# Patient Record
Sex: MALE | Race: OTHER | HISPANIC OR LATINO
[De-identification: names, ages, dates, MRNs, and addresses within clinical notes are randomized per-mention and may not be internally consistent; named-entity substitution may affect disease eponyms.]

---

## 2017-09-18 ENCOUNTER — RESULT REVIEW (OUTPATIENT)
Age: 47
End: 2017-09-18

## 2017-09-25 ENCOUNTER — INPATIENT (INPATIENT)
Facility: HOSPITAL | Age: 47
LOS: 4 days | Discharge: HOME HEALTH | End: 2017-09-30
Attending: STUDENT IN AN ORGANIZED HEALTH CARE EDUCATION/TRAINING PROGRAM | Admitting: STUDENT IN AN ORGANIZED HEALTH CARE EDUCATION/TRAINING PROGRAM
Payer: MEDICAID

## 2017-09-25 VITALS
RESPIRATION RATE: 18 BRPM | OXYGEN SATURATION: 97 % | TEMPERATURE: 99 F | DIASTOLIC BLOOD PRESSURE: 75 MMHG | SYSTOLIC BLOOD PRESSURE: 124 MMHG | HEART RATE: 93 BPM

## 2017-09-25 DIAGNOSIS — R09.1 PLEURISY: ICD-10-CM

## 2017-09-25 LAB
ALBUMIN SERPL ELPH-MCNC: 3.3 G/DL — SIGNIFICANT CHANGE UP (ref 3.3–5)
ALP SERPL-CCNC: 84 U/L — SIGNIFICANT CHANGE UP (ref 40–120)
ALT FLD-CCNC: 126 U/L — HIGH (ref 4–41)
APTT BLD: 28.2 SEC — SIGNIFICANT CHANGE UP (ref 27.5–37.4)
AST SERPL-CCNC: 68 U/L — HIGH (ref 4–40)
BILIRUB SERPL-MCNC: 0.2 MG/DL — SIGNIFICANT CHANGE UP (ref 0.2–1.2)
BLD GP AB SCN SERPL QL: NEGATIVE — SIGNIFICANT CHANGE UP
BUN SERPL-MCNC: 15 MG/DL — SIGNIFICANT CHANGE UP (ref 7–23)
CALCIUM SERPL-MCNC: 8.6 MG/DL — SIGNIFICANT CHANGE UP (ref 8.4–10.5)
CHLORIDE SERPL-SCNC: 97 MMOL/L — LOW (ref 98–107)
CO2 SERPL-SCNC: 23 MMOL/L — SIGNIFICANT CHANGE UP (ref 22–31)
CREAT SERPL-MCNC: 0.82 MG/DL — SIGNIFICANT CHANGE UP (ref 0.5–1.3)
GLUCOSE SERPL-MCNC: 103 MG/DL — HIGH (ref 70–99)
HCT VFR BLD CALC: 40.1 % — SIGNIFICANT CHANGE UP (ref 39–50)
HGB BLD-MCNC: 13.4 G/DL — SIGNIFICANT CHANGE UP (ref 13–17)
INR BLD: 1.24 — HIGH (ref 0.88–1.17)
MCHC RBC-ENTMCNC: 29.2 PG — SIGNIFICANT CHANGE UP (ref 27–34)
MCHC RBC-ENTMCNC: 33.4 % — SIGNIFICANT CHANGE UP (ref 32–36)
MCV RBC AUTO: 87.4 FL — SIGNIFICANT CHANGE UP (ref 80–100)
NRBC # FLD: 0 — SIGNIFICANT CHANGE UP
PLATELET # BLD AUTO: 300 K/UL — SIGNIFICANT CHANGE UP (ref 150–400)
PMV BLD: 10.1 FL — SIGNIFICANT CHANGE UP (ref 7–13)
POTASSIUM SERPL-MCNC: 4.4 MMOL/L — SIGNIFICANT CHANGE UP (ref 3.5–5.3)
POTASSIUM SERPL-SCNC: 4.4 MMOL/L — SIGNIFICANT CHANGE UP (ref 3.5–5.3)
PROT SERPL-MCNC: 6.3 G/DL — SIGNIFICANT CHANGE UP (ref 6–8.3)
PROTHROM AB SERPL-ACNC: 13.9 SEC — HIGH (ref 9.8–13.1)
RBC # BLD: 4.59 M/UL — SIGNIFICANT CHANGE UP (ref 4.2–5.8)
RBC # FLD: 11.7 % — SIGNIFICANT CHANGE UP (ref 10.3–14.5)
RH IG SCN BLD-IMP: POSITIVE — SIGNIFICANT CHANGE UP
SODIUM SERPL-SCNC: 133 MMOL/L — LOW (ref 135–145)
WBC # BLD: 9.94 K/UL — SIGNIFICANT CHANGE UP (ref 3.8–10.5)
WBC # FLD AUTO: 9.94 K/UL — SIGNIFICANT CHANGE UP (ref 3.8–10.5)

## 2017-09-25 PROCEDURE — 99222 1ST HOSP IP/OBS MODERATE 55: CPT

## 2017-09-25 PROCEDURE — 99223 1ST HOSP IP/OBS HIGH 75: CPT | Mod: 57

## 2017-09-25 RX ORDER — OXYCODONE AND ACETAMINOPHEN 5; 325 MG/1; MG/1
1 TABLET ORAL EVERY 4 HOURS
Qty: 0 | Refills: 0 | Status: DISCONTINUED | OUTPATIENT
Start: 2017-09-25 | End: 2017-09-27

## 2017-09-25 RX ORDER — HEPARIN SODIUM 5000 [USP'U]/ML
5000 INJECTION INTRAVENOUS; SUBCUTANEOUS EVERY 12 HOURS
Qty: 0 | Refills: 0 | Status: DISCONTINUED | OUTPATIENT
Start: 2017-09-25 | End: 2017-09-27

## 2017-09-25 RX ORDER — INFLUENZA VIRUS VACCINE 15; 15; 15; 15 UG/.5ML; UG/.5ML; UG/.5ML; UG/.5ML
0.5 SUSPENSION INTRAMUSCULAR ONCE
Qty: 0 | Refills: 0 | Status: DISCONTINUED | OUTPATIENT
Start: 2017-09-25 | End: 2017-09-27

## 2017-09-25 RX ORDER — OXYCODONE AND ACETAMINOPHEN 5; 325 MG/1; MG/1
2 TABLET ORAL EVERY 4 HOURS
Qty: 0 | Refills: 0 | Status: DISCONTINUED | OUTPATIENT
Start: 2017-09-25 | End: 2017-09-27

## 2017-09-25 RX ORDER — ACETAMINOPHEN 500 MG
650 TABLET ORAL EVERY 4 HOURS
Qty: 0 | Refills: 0 | Status: DISCONTINUED | OUTPATIENT
Start: 2017-09-25 | End: 2017-09-27

## 2017-09-25 RX ORDER — PANTOPRAZOLE SODIUM 20 MG/1
40 TABLET, DELAYED RELEASE ORAL DAILY
Qty: 0 | Refills: 0 | Status: DISCONTINUED | OUTPATIENT
Start: 2017-09-25 | End: 2017-09-27

## 2017-09-25 RX ADMIN — OXYCODONE AND ACETAMINOPHEN 2 TABLET(S): 5; 325 TABLET ORAL at 20:48

## 2017-09-25 RX ADMIN — OXYCODONE AND ACETAMINOPHEN 2 TABLET(S): 5; 325 TABLET ORAL at 20:31

## 2017-09-25 NOTE — H&P ADULT - NSHPLABSRESULTS_GEN_ALL_CORE
WBC 9.94, Hgb 13.4, Hct 40.1,   PT 13.9/ INR 1.24/ PTT 28.2  Na 133, K 4.4, BUN 15, Creat 0.82   , AST 68    CXR pnd    See HPI for results from [facility]

## 2017-09-25 NOTE — H&P ADULT - ASSESSMENT
Left lung recurrent pleural effusion, mass, PNA;  Plan OR for a FB, EBUS, L VATS for bx/ diagnostic purposes

## 2017-09-25 NOTE — H&P ADULT - HISTORY OF PRESENT ILLNESS
This 46 year old male was transferred from [facility] where he had been treated for a left pleural effusion and PNA since his admission on 9/17/17.  He has been complaining of left chest pain for 5 months.  The pain is dull and intermittent.  It is worse with coughing and when twisting his back to the right or left.  The pain radiates from the left lateral chest to the left axilla and to the left shoulder.  He was seen at the hospital and at his doctor's office for the pain.  A CXR had been done about 5 months ago and as it was negative, so he was given NSAIDS and muscle relaxants.  A CXR on 8/20/17 was also negative.  His cough worsened over the month after that and he began producing clear phlegm.  He went to the hospital on 9/17 and a CXR on that day showed a large left pleural effusion with LLL atelectasis and possible DAVE infiltrate.  He underwent an ultrasound-guided thoracentesis on 9/18 and then again on 9/22/17.  The first one drained 1.5 L and the second time drained 1.7 L.  Both were straw-colored and non-bloody.  Cytology was sent to   [facility] and is still pending.  A CT scan on 9/19 between the two drainages showed a large fluid collection with associated almost complete LLL atelectasis and minimal atelectasis in the DAVE and lingula. He was treated for the DAVE PNA with a course of Avelox.  The CT scan also showed right hilar and extensive mediastinal adenopathy, a small RLL nodule, and a left hilar mass encasing the LLL bronchus and encasing and occluding the LLL pulmonary artery. He was sent to [facility] in order to obtain a biopsy of the mass and because the most recent CXR from 9/24 showed that the effusion was reaccumulating since the CXR on 9/22.  He denies hemoptysis.  He ruled out for TB at [facility] with three negative sputums and a negative Quantiferon gold result.  He has a history of recent travel to the [place] for two weeks about 2 months ago. He is also originally from [place].  Additionally he smoked about a quarter pack of cigarettes for over twenty years until he quit 6 months ago. This 46 year old male was transferred from [facility] where he had been treated for a left pleural effusion and PNA since his admission on 9/17/17.  He has been complaining of left chest pain for 5 months.  The pain is dull and intermittent.  It is worse with coughing and when twisting his back to the right or left.  The pain radiates from the left lateral chest to the left axilla and to the left shoulder.  He was seen at the hospital and at his doctor's office for the pain.  A CXR had been done about 5 months ago and as it was negative, so he was given NSAIDS and muscle relaxants.  A CXR on 8/20/17 was also negative.  His cough worsened over the month after that and he began producing clear phlegm.  He went to the hospital on 9/17 and a CXR on that day showed a large left pleural effusion with LLL atelectasis and possible DAVE infiltrate.  He underwent an ultrasound-guided thoracentesis on 9/18 and then again on 9/22/17.  The first one drained 1.5 L and the second time drained 1.7 L.  Both were straw-colored and non-bloody.  Cytology was sent to [facility] and is still pending.  Pleural fluid cultures also sent to [facility] were negative.  A CT scan on 9/19 between the two drainages showed a large fluid collection with associated almost complete LLL atelectasis and minimal atelectasis in the DAVE and lingula. He was treated for the DAVE PNA with a course of Avelox.  The CT scan also showed right hilar and extensive mediastinal adenopathy, a small RLL nodule, and a left hilar mass encasing the LLL bronchus and encasing and occluding the LLL pulmonary artery. He was sent to [facility] in order to obtain a biopsy of the mass and because the most recent CXR from 9/24 showed that the effusion was rapidly reaccumulating since the CXR on 9/22.  He denies hemoptysis.  He ruled out for TB at [facility] with three negative sputums and a negative Quantiferon gold result.  He states that an HIV test was negative 2 months ago.  He has a history of recent travel to the [place] for two weeks about 2 months ago. He is also originally from [place].  Additionally he smoked about a quarter pack of cigarettes for over twenty years until he quit 6 months ago.

## 2017-09-25 NOTE — H&P ADULT - NSHPSOCIALHISTORY_GEN_ALL_CORE
, lives with wife, works as a  at the [employer], quit smoking 6 months ago after over twenty years of a quarter pack per day; social EtOH, no drugs

## 2017-09-25 NOTE — H&P ADULT - NSHPREVIEWOFSYSTEMS_GEN_ALL_CORE
REVIEW OF SYSTEMS      General: No Weight change/ Fatigue/ HA/Dizzy	    Skin/Breast: No Rashes/ Lesions/ Masses  	  Ophthalmologic: No Blurry vision/ Glaucoma/ Blindness  	  ENMT: No Hearing loss/ Drainage/ Lesions	    Respiratory and Thorax: Pos Cough/ Wheezing/ SOB/ Hemoptysis/ Pos Sputum production- clear, non-bloody/ Pos L chest dull pain that radiates to the shoulder.    	  Cardiovascular: No Chest pain/ Palpitations/ Diaphoresis	    Gastrointestinal: No Nausea/ Vomiting/ Constipation/ Appetite Change	    Genitourinary: No Hematuria/ Dysuria/ Frequency change/ Impotence	    Musculoskeletal: No Weakness/ Claudication	    Neurological: No Seizures/ TIA/ CVA/ Paresthesias	    Psychiatric: No Dementia/ Depression/ SI/HI	    Hematology/Lymphatics: No hx of bleeding/ Edema	    Endocrine: No Hyperglycemia/ Hypoglycemia    Allergic/Immunologic:  No Anaphylaxis/ Intolerance/ Recent illnesses

## 2017-09-25 NOTE — H&P ADULT - ATTENDING COMMENTS
patient seen and examined, images from outside hospital reviewed. drainage x 2 of recurrent left pleural effusion -will plan for left vats - pleural biopsy will eval for advanced stage - need tissue diagnosis. if no obvious pleural disease will plan for flex bronch/ebus of hilar mass.   NPO p midnight. patient understands plan is to obtain tissue for diagnosis .

## 2017-09-25 NOTE — H&P ADULT - NSHPPHYSICALEXAM_GEN_ALL_CORE
General: WN/WD NAD  Neurology: A&Ox3, nonfocal, ARELLANO x 4  Eyes: PERRLA/ EOMI, Gross vision intact  ENT/Neck: Neck supple, trachea midline, No JVD, Gross hearing intact  Respiratory: CTA B/L with decreased breath sounds at the left base, No wheezing, rales, rhonchi  CV: RRR, S1S2, no murmurs  Abdominal: Soft, NT, ND +BS,   Extremities: No edema, + peripheral pulses  Skin: No Rashes, Hematoma, Ecchymosis

## 2017-09-26 LAB
ALBUMIN SERPL ELPH-MCNC: 3.4 G/DL — SIGNIFICANT CHANGE UP (ref 3.3–5)
ALP SERPL-CCNC: 80 U/L — SIGNIFICANT CHANGE UP (ref 40–120)
ALT FLD-CCNC: 113 U/L — HIGH (ref 4–41)
AST SERPL-CCNC: 44 U/L — HIGH (ref 4–40)
BILIRUB SERPL-MCNC: 0.4 MG/DL — SIGNIFICANT CHANGE UP (ref 0.2–1.2)
BLD GP AB SCN SERPL QL: NEGATIVE — SIGNIFICANT CHANGE UP
BUN SERPL-MCNC: 12 MG/DL — SIGNIFICANT CHANGE UP (ref 7–23)
BUN SERPL-MCNC: 14 MG/DL — SIGNIFICANT CHANGE UP (ref 7–23)
CALCIUM SERPL-MCNC: 8.5 MG/DL — SIGNIFICANT CHANGE UP (ref 8.4–10.5)
CALCIUM SERPL-MCNC: 8.7 MG/DL — SIGNIFICANT CHANGE UP (ref 8.4–10.5)
CHLORIDE SERPL-SCNC: 97 MMOL/L — LOW (ref 98–107)
CHLORIDE SERPL-SCNC: 97 MMOL/L — LOW (ref 98–107)
CO2 SERPL-SCNC: 26 MMOL/L — SIGNIFICANT CHANGE UP (ref 22–31)
CO2 SERPL-SCNC: 28 MMOL/L — SIGNIFICANT CHANGE UP (ref 22–31)
CREAT SERPL-MCNC: 0.96 MG/DL — SIGNIFICANT CHANGE UP (ref 0.5–1.3)
CREAT SERPL-MCNC: 1.05 MG/DL — SIGNIFICANT CHANGE UP (ref 0.5–1.3)
GLUCOSE SERPL-MCNC: 101 MG/DL — HIGH (ref 70–99)
GLUCOSE SERPL-MCNC: 144 MG/DL — HIGH (ref 70–99)
POTASSIUM SERPL-MCNC: 4.4 MMOL/L — SIGNIFICANT CHANGE UP (ref 3.5–5.3)
POTASSIUM SERPL-MCNC: 5.3 MMOL/L — SIGNIFICANT CHANGE UP (ref 3.5–5.3)
POTASSIUM SERPL-SCNC: 4.4 MMOL/L — SIGNIFICANT CHANGE UP (ref 3.5–5.3)
POTASSIUM SERPL-SCNC: 5.3 MMOL/L — SIGNIFICANT CHANGE UP (ref 3.5–5.3)
PROT SERPL-MCNC: 6.4 G/DL — SIGNIFICANT CHANGE UP (ref 6–8.3)
RH IG SCN BLD-IMP: POSITIVE — SIGNIFICANT CHANGE UP
SODIUM SERPL-SCNC: 137 MMOL/L — SIGNIFICANT CHANGE UP (ref 135–145)
SODIUM SERPL-SCNC: 137 MMOL/L — SIGNIFICANT CHANGE UP (ref 135–145)

## 2017-09-26 PROCEDURE — 71010: CPT | Mod: 26

## 2017-09-26 RX ADMIN — OXYCODONE AND ACETAMINOPHEN 2 TABLET(S): 5; 325 TABLET ORAL at 13:44

## 2017-09-26 RX ADMIN — OXYCODONE AND ACETAMINOPHEN 2 TABLET(S): 5; 325 TABLET ORAL at 20:15

## 2017-09-26 RX ADMIN — HEPARIN SODIUM 5000 UNIT(S): 5000 INJECTION INTRAVENOUS; SUBCUTANEOUS at 05:14

## 2017-09-26 RX ADMIN — OXYCODONE AND ACETAMINOPHEN 2 TABLET(S): 5; 325 TABLET ORAL at 19:29

## 2017-09-26 RX ADMIN — PANTOPRAZOLE SODIUM 40 MILLIGRAM(S): 20 TABLET, DELAYED RELEASE ORAL at 11:53

## 2017-09-26 RX ADMIN — OXYCODONE AND ACETAMINOPHEN 2 TABLET(S): 5; 325 TABLET ORAL at 01:04

## 2017-09-26 RX ADMIN — OXYCODONE AND ACETAMINOPHEN 2 TABLET(S): 5; 325 TABLET ORAL at 14:20

## 2017-09-26 RX ADMIN — HEPARIN SODIUM 5000 UNIT(S): 5000 INJECTION INTRAVENOUS; SUBCUTANEOUS at 17:21

## 2017-09-26 RX ADMIN — OXYCODONE AND ACETAMINOPHEN 2 TABLET(S): 5; 325 TABLET ORAL at 01:15

## 2017-09-26 RX ADMIN — OXYCODONE AND ACETAMINOPHEN 2 TABLET(S): 5; 325 TABLET ORAL at 08:30

## 2017-09-26 RX ADMIN — OXYCODONE AND ACETAMINOPHEN 2 TABLET(S): 5; 325 TABLET ORAL at 07:50

## 2017-09-26 NOTE — PROGRESS NOTE ADULT - SUBJECTIVE AND OBJECTIVE BOX
Subjective:    Vital Signs:  Vital Signs Last 24 Hrs  T(C): 36.9 (09-26-17 @ 07:44), Max: 37.4 (09-25-17 @ 16:46)  T(F): 98.4 (09-26-17 @ 07:44), Max: 99.4 (09-25-17 @ 16:46)  HR: 7 (09-26-17 @ 07:44) (7 - 93)  BP: 132/73 (09-26-17 @ 07:44) (124/75 - 132/73)  RR: 18 (09-26-17 @ 07:44) (18 - 18)  SpO2: 97% (09-26-17 @ 07:44) (97% - 98%) on (O2)    Telemetry/Alarms:  General: WN/WD NAD  Neurology: Awake, nonfocal, ARELLANO x 4  Eyes: Scleras clear, PERRLA/ EOMI, Gross vision intact  ENT:Gross hearing intact, grossly patent pharynx, no stridor  Neck: Neck supple, trachea midline, No JVD,   Respiratory: CTA B/L, No wheezing, rales, rhonchi  CV: RRR, S1S2, no murmurs, rubs or gallops  Abdominal: Soft, NT, ND +BS,   Extremities: No edema, + peripheral pulses  Skin: No Rashes, Hematoma, Ecchymosis  Lymphatic: No Neck, axilla, groin LAD  Psych: Oriented x 3, normal affect  Incisions:   Tubes:  Relevant labs, radiology and Medications reviewed                        13.4   9.94  )-----------( 300      ( 25 Sep 2017 18:18 )             40.1     09-26    137  |  97<L>  |  12  ----------------------------<  101<H>  5.3   |  28  |  0.96    Ca    8.7      26 Sep 2017 06:00    TPro  6.4  /  Alb  3.4  /  TBili  0.4  /  DBili  x   /  AST  44<H>  /  ALT  113<H>  /  AlkPhos  80  09-26    PT/INR - ( 25 Sep 2017 18:18 )   PT: 13.9 SEC;   INR: 1.24          PTT - ( 25 Sep 2017 18:18 )  PTT:28.2 SEC  MEDICATIONS  (STANDING):  influenza   Vaccine 0.5 milliLiter(s) IntraMuscular once  heparin  Injectable 5000 Unit(s) SubCutaneous every 12 hours  pantoprazole  Injectable 40 milliGRAM(s) IV Push daily    MEDICATIONS  (PRN):  oxyCODONE    5 mG/acetaminophen 325 mG 1 Tablet(s) Oral every 4 hours PRN Moderate Pain (4 - 6)  oxyCODONE    5 mG/acetaminophen 325 mG 2 Tablet(s) Oral every 4 hours PRN Severe Pain (7 - 10)  acetaminophen   Tablet. 650 milliGRAM(s) Oral every 4 hours PRN Mild Pain (1 - 3)    Pertinent Physical Exam  I&O's Summary    25 Sep 2017 07:01  -  26 Sep 2017 07:00  --------------------------------------------------------  IN: 150 mL / OUT: 500 mL / NET: -350 mL        Assessment  46y Male  w/ PAST MEDICAL & SURGICAL HISTORY:  No pertinent past medical history  No significant past surgical history  admitted with complaints of Patient is a 46y old  Male who presents with a chief complaint of Recurrent left pleural effusion and left hilar mass (25 Sep 2017 20:37)  .  On (Date), patient underwent . Postoperative course/issues:    PLAN  Neuro: Pain management  Pulm: Encourage coughing, deep breathing and use of incentive spirometry. Wean off supplemental oxygen as able. Daily CXR.   Cardio: Monitor telemetry/alarms  GI: Tolerating diet. Continue stool softeners.  Renal: monitor urine output, supplement electrolytes as needed  Vasc: Heparin SC/SCDs for DVT prophylaxis  Heme: Stable H/H. .   ID: Off antibiotics. Stable.  Therapy: OOB/ambulate  Tubes: Monitor Chest tube output  Disposition: Aim to D/C to home on  Discussed with Cardiothoracic Team at AM rounds. Subjective:    Vital Signs: Hemodynamically stable and afebrile  Vital Signs Last 24 Hrs  T(C): 36.9 (09-26-17 @ 07:44), Max: 37.4 (09-25-17 @ 16:46)  T(F): 98.4 (09-26-17 @ 07:44), Max: 99.4 (09-25-17 @ 16:46)  HR: 7 (09-26-17 @ 07:44) (7 - 93)  BP: 132/73 (09-26-17 @ 07:44) (124/75 - 132/73)  RR: 18 (09-26-17 @ 07:44) (18 - 18)  SpO2: 97% (09-26-17 @ 07:44) (97% - 98%) on 21% O2    Telemetry/Alarms: NSR  General: WN/WD NAD  Neurology: Awake, nonfocal, ARELLANO x 4  Eyes: Scleras clear, PERRLA/ EOMI, Gross vision intact  ENT:Gross hearing intact, grossly patent pharynx, no stridor  Neck: Neck supple, trachea midline, No JVD,   Respiratory: CTA Rt. Decrease Lt LL,   CV: RRR, S1S2, no murmurs, rubs or gallops  Abdominal: Soft, NT, ND +BS,   Extremities: No edema, + peripheral pulses, no calf pain or tenderness  Skin: No Rashes, Hematoma, Ecchymosis  Lymphatic: No Neck, axilla, groin LAD  Psych: Oriented x 3, normal affect  Incisions: None  Tubes: None  Relevant labs, radiology-> and Medications reviewed                        13.4   9.94  )-----------( 300      ( 25 Sep 2017 18:18 )             40.1     09-26    137  |  97<L>  |  12  ----------------------------<  101<H>  5.3   |  28  |  0.96    Ca    8.7      26 Sep 2017 06:00    TPro  6.4  /  Alb  3.4  /  TBili  0.4  /  DBili  x   /  AST  44<H>  /  ALT  113<H>  /  AlkPhos  80  09-26    PT/INR - ( 25 Sep 2017 18:18 )   PT: 13.9 SEC;   INR: 1.24          PTT - ( 25 Sep 2017 18:18 )  PTT:28.2 SEC  MEDICATIONS  (STANDING):  influenza   Vaccine 0.5 milliLiter(s) IntraMuscular once  heparin  Injectable 5000 Unit(s) SubCutaneous every 12 hours  pantoprazole  Injectable 40 milliGRAM(s) IV Push daily    MEDICATIONS  (PRN):  oxyCODONE    5 mG/acetaminophen 325 mG 1 Tablet(s) Oral every 4 hours PRN Moderate Pain (4 - 6)  oxyCODONE    5 mG/acetaminophen 325 mG 2 Tablet(s) Oral every 4 hours PRN Severe Pain (7 - 10)  acetaminophen   Tablet. 650 milliGRAM(s) Oral every 4 hours PRN Mild Pain (1 - 3)    Pertinent Physical Exam  I&O's Summary    25 Sep 2017 07:01  -  26 Sep 2017 07:00  --------------------------------------------------------  IN: 150 mL / OUT: 500 mL / NET: -350 mL        Assessment  46y Male  w/ PAST MEDICAL & SURGICAL HISTORY:  No pertinent past medical history  No significant past surgical history  admitted with complaints of Patient is a 46y old  Male who presents with a chief complaint of Recurrent left pleural effusion and left hilar mass (25 Sep 2017 20:37)  .  On (Date), patient underwent . Postoperative course/issues:    PLAN  Neuro: Pain management  Pulm: Encourage coughing, deep breathing and use of incentive spirometry.   Daily CXR.   Cardio: Monitor telemetry/alarms  GI: Tolerating diet. Continue stool softeners.  Renal: monitor urine output, supplement electrolytes as needed  Vasc: Heparin SC/SCDs for DVT prophylaxis  Heme: Stable H/H. .   ID: Off antibiotics. Stable.  Therapy: OOB/ambulate/ OR on 9-27/ pre-op teaching  Disposition: Aim to D/C to home post surgical procedureon  Discussed with Cardiothoracic Team at AM rounds. Subjective:    Vital Signs: Hemodynamically stable and afebrile  Vital Signs Last 24 Hrs  T(C): 36.9 (09-26-17 @ 07:44), Max: 37.4 (09-25-17 @ 16:46)  T(F): 98.4 (09-26-17 @ 07:44), Max: 99.4 (09-25-17 @ 16:46)  HR: 7 (09-26-17 @ 07:44) (7 - 93)  BP: 132/73 (09-26-17 @ 07:44) (124/75 - 132/73)  RR: 18 (09-26-17 @ 07:44) (18 - 18)  SpO2: 97% (09-26-17 @ 07:44) (97% - 98%) on 21% O2    Telemetry/Alarms: NSR  General: WN/WD NAD  Neurology: Awake, nonfocal, ARELLANO x 4  Eyes: Scleras clear, PERRLA/ EOMI, Gross vision intact  ENT:Gross hearing intact, grossly patent pharynx, no stridor  Neck: Neck supple, trachea midline, No JVD,   Respiratory: CTA Rt. Decrease Lt LL,   CV: RRR, S1S2, no murmurs, rubs or gallops  Abdominal: Soft, NT, ND +BS,   Extremities: No edema, + peripheral pulses, no calf pain or tenderness  Skin: No Rashes, Hematoma, Ecchymosis  Lymphatic: No Neck, axilla, groin LAD  Psych: Oriented x 3, normal affect  Incisions: None  Tubes: None  Relevant labs, radiology->Left-sided findings noted which may be secondary to loculated  pleural effusion with associated passive atelectasis. Other pulmonary parenchymal or pleural pathology is not excluded. Suggest correlation with  CT scan of the chest for more definitive evaluation. Elevated poorly defined left hemidiaphragm. and Medications reviewed                          13.4   9.94  )-----------( 300      ( 25 Sep 2017 18:18 )             40.1     09-26    137  |  97<L>  |  12  ----------------------------<  101<H>  5.3   |  28  |  0.96    Ca    8.7      26 Sep 2017 06:00    TPro  6.4  /  Alb  3.4  /  TBili  0.4  /  DBili  x   /  AST  44<H>  /  ALT  113<H>  /  AlkPhos  80  09-26    PT/INR - ( 25 Sep 2017 18:18 )   PT: 13.9 SEC;   INR: 1.24          PTT - ( 25 Sep 2017 18:18 )  PTT:28.2 SEC  MEDICATIONS  (STANDING):  influenza   Vaccine 0.5 milliLiter(s) IntraMuscular once  heparin  Injectable 5000 Unit(s) SubCutaneous every 12 hours  pantoprazole  Injectable 40 milliGRAM(s) IV Push daily    MEDICATIONS  (PRN):  oxyCODONE    5 mG/acetaminophen 325 mG 1 Tablet(s) Oral every 4 hours PRN Moderate Pain (4 - 6)  oxyCODONE    5 mG/acetaminophen 325 mG 2 Tablet(s) Oral every 4 hours PRN Severe Pain (7 - 10)  acetaminophen   Tablet. 650 milliGRAM(s) Oral every 4 hours PRN Mild Pain (1 - 3)    Pertinent Physical Exam  I&O's Summary    25 Sep 2017 07:01  -  26 Sep 2017 07:00  --------------------------------------------------------  IN: 150 mL / OUT: 500 mL / NET: -350 mL        Assessment  46y Male  w/ PAST MEDICAL & SURGICAL HISTORY:  No pertinent past medical history  No significant past surgical history  admitted with complaints of Patient is a 46y old  Male who presents with a chief complaint of Recurrent left pleural effusion and left hilar mass (25 Sep 2017 20:37)  .  On (Date), patient underwent . Postoperative course/issues:    PLAN  Neuro: Pain management  Pulm: Encourage coughing, deep breathing and use of incentive spirometry.   Daily CXR.   Cardio: Monitor telemetry/alarms  GI: Tolerating diet. Continue stool softeners.  Renal: monitor urine output, supplement electrolytes as needed  Vasc: Heparin SC/SCDs for DVT prophylaxis  Heme: Stable H/H. .   ID: Off antibiotics. Stable.  Therapy: OOB/ambulate/ OR on 9-27/ pre-op teaching  Disposition: Aim to D/C to home post surgical procedureon  Discussed with Cardiothoracic Team at AM rounds.

## 2017-09-27 ENCOUNTER — RESULT REVIEW (OUTPATIENT)
Age: 47
End: 2017-09-27

## 2017-09-27 PROCEDURE — 71010: CPT | Mod: 26,77

## 2017-09-27 PROCEDURE — 88331 PATH CONSLTJ SURG 1 BLK 1SPC: CPT | Mod: 26

## 2017-09-27 PROCEDURE — 32556 INSERT CATH PLEURA W/O IMAGE: CPT | Mod: 59

## 2017-09-27 PROCEDURE — 88305 TISSUE EXAM BY PATHOLOGIST: CPT | Mod: 26

## 2017-09-27 PROCEDURE — 88342 IMHCHEM/IMCYTCHM 1ST ANTB: CPT | Mod: 26,59

## 2017-09-27 PROCEDURE — 88342 IMHCHEM/IMCYTCHM 1ST ANTB: CPT | Mod: 26

## 2017-09-27 PROCEDURE — 88341 IMHCHEM/IMCYTCHM EA ADD ANTB: CPT | Mod: 26

## 2017-09-27 PROCEDURE — 32609 THORACOSCOPY W/BX PLEURA: CPT | Mod: AS

## 2017-09-27 PROCEDURE — 32609 THORACOSCOPY W/BX PLEURA: CPT

## 2017-09-27 PROCEDURE — 71010: CPT | Mod: 26

## 2017-09-27 PROCEDURE — 88112 CYTOPATH CELL ENHANCE TECH: CPT | Mod: 26

## 2017-09-27 PROCEDURE — 88341 IMHCHEM/IMCYTCHM EA ADD ANTB: CPT | Mod: 26,59

## 2017-09-27 RX ORDER — HYDROMORPHONE HYDROCHLORIDE 2 MG/ML
0.5 INJECTION INTRAMUSCULAR; INTRAVENOUS; SUBCUTANEOUS
Qty: 0 | Refills: 0 | Status: DISCONTINUED | OUTPATIENT
Start: 2017-09-27 | End: 2017-09-27

## 2017-09-27 RX ORDER — HYDROMORPHONE HYDROCHLORIDE 2 MG/ML
1 INJECTION INTRAMUSCULAR; INTRAVENOUS; SUBCUTANEOUS
Qty: 0 | Refills: 0 | Status: DISCONTINUED | OUTPATIENT
Start: 2017-09-27 | End: 2017-09-27

## 2017-09-27 RX ORDER — HEPARIN SODIUM 5000 [USP'U]/ML
5000 INJECTION INTRAVENOUS; SUBCUTANEOUS EVERY 8 HOURS
Qty: 0 | Refills: 0 | Status: DISCONTINUED | OUTPATIENT
Start: 2017-09-27 | End: 2017-09-30

## 2017-09-27 RX ORDER — HYDROMORPHONE HYDROCHLORIDE 2 MG/ML
1 INJECTION INTRAMUSCULAR; INTRAVENOUS; SUBCUTANEOUS EVERY 4 HOURS
Qty: 0 | Refills: 0 | Status: DISCONTINUED | OUTPATIENT
Start: 2017-09-27 | End: 2017-09-30

## 2017-09-27 RX ORDER — DOCUSATE SODIUM 100 MG
100 CAPSULE ORAL THREE TIMES A DAY
Qty: 0 | Refills: 0 | Status: DISCONTINUED | OUTPATIENT
Start: 2017-09-27 | End: 2017-09-30

## 2017-09-27 RX ORDER — PANTOPRAZOLE SODIUM 20 MG/1
40 TABLET, DELAYED RELEASE ORAL
Qty: 0 | Refills: 0 | Status: DISCONTINUED | OUTPATIENT
Start: 2017-09-27 | End: 2017-09-30

## 2017-09-27 RX ORDER — OXYCODONE HYDROCHLORIDE 5 MG/1
10 TABLET ORAL EVERY 4 HOURS
Qty: 0 | Refills: 0 | Status: DISCONTINUED | OUTPATIENT
Start: 2017-09-27 | End: 2017-09-28

## 2017-09-27 RX ORDER — OXYCODONE HYDROCHLORIDE 5 MG/1
5 TABLET ORAL EVERY 4 HOURS
Qty: 0 | Refills: 0 | Status: DISCONTINUED | OUTPATIENT
Start: 2017-09-27 | End: 2017-09-28

## 2017-09-27 RX ORDER — SENNA PLUS 8.6 MG/1
2 TABLET ORAL AT BEDTIME
Qty: 0 | Refills: 0 | Status: DISCONTINUED | OUTPATIENT
Start: 2017-09-27 | End: 2017-09-30

## 2017-09-27 RX ORDER — ACETAMINOPHEN 500 MG
650 TABLET ORAL EVERY 6 HOURS
Qty: 0 | Refills: 0 | Status: DISCONTINUED | OUTPATIENT
Start: 2017-09-27 | End: 2017-09-30

## 2017-09-27 RX ORDER — ONDANSETRON 8 MG/1
4 TABLET, FILM COATED ORAL ONCE
Qty: 0 | Refills: 0 | Status: DISCONTINUED | OUTPATIENT
Start: 2017-09-27 | End: 2017-09-30

## 2017-09-27 RX ADMIN — HYDROMORPHONE HYDROCHLORIDE 1 MILLIGRAM(S): 2 INJECTION INTRAMUSCULAR; INTRAVENOUS; SUBCUTANEOUS at 18:31

## 2017-09-27 RX ADMIN — Medication 100 MILLIGRAM(S): at 21:51

## 2017-09-27 RX ADMIN — OXYCODONE HYDROCHLORIDE 5 MILLIGRAM(S): 5 TABLET ORAL at 21:20

## 2017-09-27 RX ADMIN — OXYCODONE AND ACETAMINOPHEN 2 TABLET(S): 5; 325 TABLET ORAL at 00:49

## 2017-09-27 RX ADMIN — OXYCODONE AND ACETAMINOPHEN 2 TABLET(S): 5; 325 TABLET ORAL at 01:30

## 2017-09-27 RX ADMIN — OXYCODONE AND ACETAMINOPHEN 2 TABLET(S): 5; 325 TABLET ORAL at 06:15

## 2017-09-27 RX ADMIN — HYDROMORPHONE HYDROCHLORIDE 1 MILLIGRAM(S): 2 INJECTION INTRAMUSCULAR; INTRAVENOUS; SUBCUTANEOUS at 18:51

## 2017-09-27 RX ADMIN — HYDROMORPHONE HYDROCHLORIDE 1 MILLIGRAM(S): 2 INJECTION INTRAMUSCULAR; INTRAVENOUS; SUBCUTANEOUS at 22:00

## 2017-09-27 RX ADMIN — HYDROMORPHONE HYDROCHLORIDE 0.5 MILLIGRAM(S): 2 INJECTION INTRAMUSCULAR; INTRAVENOUS; SUBCUTANEOUS at 15:42

## 2017-09-27 RX ADMIN — HYDROMORPHONE HYDROCHLORIDE 0.5 MILLIGRAM(S): 2 INJECTION INTRAMUSCULAR; INTRAVENOUS; SUBCUTANEOUS at 16:15

## 2017-09-27 RX ADMIN — HYDROMORPHONE HYDROCHLORIDE 0.5 MILLIGRAM(S): 2 INJECTION INTRAMUSCULAR; INTRAVENOUS; SUBCUTANEOUS at 16:30

## 2017-09-27 RX ADMIN — HEPARIN SODIUM 5000 UNIT(S): 5000 INJECTION INTRAVENOUS; SUBCUTANEOUS at 05:26

## 2017-09-27 RX ADMIN — HYDROMORPHONE HYDROCHLORIDE 0.5 MILLIGRAM(S): 2 INJECTION INTRAMUSCULAR; INTRAVENOUS; SUBCUTANEOUS at 16:00

## 2017-09-27 RX ADMIN — OXYCODONE AND ACETAMINOPHEN 2 TABLET(S): 5; 325 TABLET ORAL at 09:28

## 2017-09-27 RX ADMIN — SENNA PLUS 2 TABLET(S): 8.6 TABLET ORAL at 21:51

## 2017-09-27 RX ADMIN — OXYCODONE HYDROCHLORIDE 5 MILLIGRAM(S): 5 TABLET ORAL at 20:21

## 2017-09-27 RX ADMIN — HEPARIN SODIUM 5000 UNIT(S): 5000 INJECTION INTRAVENOUS; SUBCUTANEOUS at 21:51

## 2017-09-27 RX ADMIN — OXYCODONE AND ACETAMINOPHEN 2 TABLET(S): 5; 325 TABLET ORAL at 12:37

## 2017-09-27 RX ADMIN — OXYCODONE AND ACETAMINOPHEN 2 TABLET(S): 5; 325 TABLET ORAL at 05:26

## 2017-09-27 RX ADMIN — HYDROMORPHONE HYDROCHLORIDE 0.5 MILLIGRAM(S): 2 INJECTION INTRAMUSCULAR; INTRAVENOUS; SUBCUTANEOUS at 15:30

## 2017-09-27 RX ADMIN — HYDROMORPHONE HYDROCHLORIDE 1 MILLIGRAM(S): 2 INJECTION INTRAMUSCULAR; INTRAVENOUS; SUBCUTANEOUS at 21:51

## 2017-09-27 RX ADMIN — HYDROMORPHONE HYDROCHLORIDE 0.5 MILLIGRAM(S): 2 INJECTION INTRAMUSCULAR; INTRAVENOUS; SUBCUTANEOUS at 15:19

## 2017-09-27 NOTE — BRIEF OPERATIVE NOTE - PROCEDURE
<<-----Click on this checkbox to enter Procedure Thoracoscopic biopsy of pleura  09/27/2017  Left VATS w/ drainage of effusion, pleural bx, PleurX catheter placement  Active  [email or web address]

## 2017-09-27 NOTE — PROGRESS NOTE ADULT - SUBJECTIVE AND OBJECTIVE BOX
Patient resting comfortably, no complaints of pain.  Left vats incision with dressing clean and dry.  Left pleurx cath to pleuorvac to water seal.  Lung sounds decreased on left side.  Abdomen soft and non distended, +BSx4.  Tolerating po.    Vital Signs Last 24 Hrs  T(C): 37.5 (27 Sep 2017 19:34), Max: 37.5 (27 Sep 2017 19:34)  T(F): 99.5 (27 Sep 2017 19:34), Max: 99.5 (27 Sep 2017 19:34)  HR: 99 (27 Sep 2017 19:34) (70 - 100)  BP: 120/73 (27 Sep 2017 19:34) (113/68 - 157/91)  BP(mean): --  RR: 18 (27 Sep 2017 19:34) (15 - 21)  SpO2: 97% (27 Sep 2017 19:34) (95% - 99%)  I&O's Detail    26 Sep 2017 07:01  -  27 Sep 2017 07:00  --------------------------------------------------------  IN:  Total IN: 0 mL    OUT:    Voided: 1600 mL  Total OUT: 1600 mL    Total NET: -1600 mL      27 Sep 2017 07:01  -  27 Sep 2017 20:06  --------------------------------------------------------  IN:    Oral Fluid: 190 mL  Total IN: 190 mL    OUT:    Chest Tube: 240 mL  Total OUT: 240 mL    Total NET: -50 mL      A/P: S/P Left Vats, pleural biopsy, drainage of effusion and placement of pleurx cath  	Continue pleurx to water seal               Follow chest x-ray in am               Chest PT, ambulation and incentive spirometer                VNS for pleurx drainage   	  Pain management

## 2017-09-28 ENCOUNTER — TRANSCRIPTION ENCOUNTER (OUTPATIENT)
Age: 47
End: 2017-09-28

## 2017-09-28 LAB
BASOPHILS # BLD AUTO: 0.05 K/UL — SIGNIFICANT CHANGE UP (ref 0–0.2)
BASOPHILS NFR BLD AUTO: 0.5 % — SIGNIFICANT CHANGE UP (ref 0–2)
BUN SERPL-MCNC: 12 MG/DL — SIGNIFICANT CHANGE UP (ref 7–23)
CALCIUM SERPL-MCNC: 8.4 MG/DL — SIGNIFICANT CHANGE UP (ref 8.4–10.5)
CHLORIDE SERPL-SCNC: 94 MMOL/L — LOW (ref 98–107)
CO2 SERPL-SCNC: 25 MMOL/L — SIGNIFICANT CHANGE UP (ref 22–31)
CREAT SERPL-MCNC: 0.79 MG/DL — SIGNIFICANT CHANGE UP (ref 0.5–1.3)
EOSINOPHIL # BLD AUTO: 0.29 K/UL — SIGNIFICANT CHANGE UP (ref 0–0.5)
EOSINOPHIL NFR BLD AUTO: 2.6 % — SIGNIFICANT CHANGE UP (ref 0–6)
GLUCOSE SERPL-MCNC: 113 MG/DL — HIGH (ref 70–99)
HCT VFR BLD CALC: 40.6 % — SIGNIFICANT CHANGE UP (ref 39–50)
HCT VFR BLD CALC: 40.6 % — SIGNIFICANT CHANGE UP (ref 39–50)
HGB BLD-MCNC: 13.7 G/DL — SIGNIFICANT CHANGE UP (ref 13–17)
HGB BLD-MCNC: 13.7 G/DL — SIGNIFICANT CHANGE UP (ref 13–17)
IMM GRANULOCYTES # BLD AUTO: 0.08 # — SIGNIFICANT CHANGE UP
IMM GRANULOCYTES NFR BLD AUTO: 0.7 % — SIGNIFICANT CHANGE UP (ref 0–1.5)
LYMPHOCYTES # BLD AUTO: 1.03 K/UL — SIGNIFICANT CHANGE UP (ref 1–3.3)
LYMPHOCYTES # BLD AUTO: 9.4 % — LOW (ref 13–44)
MCHC RBC-ENTMCNC: 30 PG — SIGNIFICANT CHANGE UP (ref 27–34)
MCHC RBC-ENTMCNC: 30 PG — SIGNIFICANT CHANGE UP (ref 27–34)
MCHC RBC-ENTMCNC: 33.7 % — SIGNIFICANT CHANGE UP (ref 32–36)
MCHC RBC-ENTMCNC: 33.7 % — SIGNIFICANT CHANGE UP (ref 32–36)
MCV RBC AUTO: 88.8 FL — SIGNIFICANT CHANGE UP (ref 80–100)
MCV RBC AUTO: 88.8 FL — SIGNIFICANT CHANGE UP (ref 80–100)
MONOCYTES # BLD AUTO: 1.2 K/UL — HIGH (ref 0–0.9)
MONOCYTES NFR BLD AUTO: 11 % — SIGNIFICANT CHANGE UP (ref 2–14)
NEUTROPHILS # BLD AUTO: 8.3 K/UL — HIGH (ref 1.8–7.4)
NEUTROPHILS NFR BLD AUTO: 75.8 % — SIGNIFICANT CHANGE UP (ref 43–77)
NRBC # FLD: 0 — SIGNIFICANT CHANGE UP
NRBC # FLD: 0 — SIGNIFICANT CHANGE UP
PLATELET # BLD AUTO: 302 K/UL — SIGNIFICANT CHANGE UP (ref 150–400)
PLATELET # BLD AUTO: 302 K/UL — SIGNIFICANT CHANGE UP (ref 150–400)
PMV BLD: 10.4 FL — SIGNIFICANT CHANGE UP (ref 7–13)
PMV BLD: 10.4 FL — SIGNIFICANT CHANGE UP (ref 7–13)
POTASSIUM SERPL-MCNC: 4.4 MMOL/L — SIGNIFICANT CHANGE UP (ref 3.5–5.3)
POTASSIUM SERPL-SCNC: 4.4 MMOL/L — SIGNIFICANT CHANGE UP (ref 3.5–5.3)
RBC # BLD: 4.57 M/UL — SIGNIFICANT CHANGE UP (ref 4.2–5.8)
RBC # BLD: 4.57 M/UL — SIGNIFICANT CHANGE UP (ref 4.2–5.8)
RBC # FLD: 11.7 % — SIGNIFICANT CHANGE UP (ref 10.3–14.5)
RBC # FLD: 11.7 % — SIGNIFICANT CHANGE UP (ref 10.3–14.5)
SODIUM SERPL-SCNC: 132 MMOL/L — LOW (ref 135–145)
WBC # BLD: 10.95 K/UL — HIGH (ref 3.8–10.5)
WBC # BLD: 10.95 K/UL — HIGH (ref 3.8–10.5)
WBC # FLD AUTO: 10.95 K/UL — HIGH (ref 3.8–10.5)
WBC # FLD AUTO: 10.95 K/UL — HIGH (ref 3.8–10.5)

## 2017-09-28 PROCEDURE — 99233 SBSQ HOSP IP/OBS HIGH 50: CPT

## 2017-09-28 PROCEDURE — 71010: CPT | Mod: 26

## 2017-09-28 PROCEDURE — 70553 MRI BRAIN STEM W/O & W/DYE: CPT | Mod: 26

## 2017-09-28 RX ORDER — OXYCODONE AND ACETAMINOPHEN 5; 325 MG/1; MG/1
1 TABLET ORAL EVERY 4 HOURS
Qty: 0 | Refills: 0 | Status: DISCONTINUED | OUTPATIENT
Start: 2017-09-28 | End: 2017-09-30

## 2017-09-28 RX ORDER — OXYCODONE AND ACETAMINOPHEN 5; 325 MG/1; MG/1
2 TABLET ORAL EVERY 4 HOURS
Qty: 0 | Refills: 0 | Status: DISCONTINUED | OUTPATIENT
Start: 2017-09-28 | End: 2017-09-30

## 2017-09-28 RX ORDER — GABAPENTIN 400 MG/1
100 CAPSULE ORAL EVERY 8 HOURS
Qty: 0 | Refills: 0 | Status: DISCONTINUED | OUTPATIENT
Start: 2017-09-28 | End: 2017-09-30

## 2017-09-28 RX ADMIN — Medication 100 MILLIGRAM(S): at 21:41

## 2017-09-28 RX ADMIN — SENNA PLUS 2 TABLET(S): 8.6 TABLET ORAL at 21:41

## 2017-09-28 RX ADMIN — OXYCODONE AND ACETAMINOPHEN 2 TABLET(S): 5; 325 TABLET ORAL at 07:41

## 2017-09-28 RX ADMIN — HEPARIN SODIUM 5000 UNIT(S): 5000 INJECTION INTRAVENOUS; SUBCUTANEOUS at 05:42

## 2017-09-28 RX ADMIN — OXYCODONE AND ACETAMINOPHEN 2 TABLET(S): 5; 325 TABLET ORAL at 01:30

## 2017-09-28 RX ADMIN — OXYCODONE AND ACETAMINOPHEN 2 TABLET(S): 5; 325 TABLET ORAL at 08:08

## 2017-09-28 RX ADMIN — HEPARIN SODIUM 5000 UNIT(S): 5000 INJECTION INTRAVENOUS; SUBCUTANEOUS at 21:41

## 2017-09-28 RX ADMIN — OXYCODONE AND ACETAMINOPHEN 2 TABLET(S): 5; 325 TABLET ORAL at 22:00

## 2017-09-28 RX ADMIN — OXYCODONE AND ACETAMINOPHEN 2 TABLET(S): 5; 325 TABLET ORAL at 00:37

## 2017-09-28 RX ADMIN — OXYCODONE AND ACETAMINOPHEN 2 TABLET(S): 5; 325 TABLET ORAL at 17:05

## 2017-09-28 RX ADMIN — Medication 100 MILLIGRAM(S): at 16:05

## 2017-09-28 RX ADMIN — GABAPENTIN 100 MILLIGRAM(S): 400 CAPSULE ORAL at 21:41

## 2017-09-28 RX ADMIN — PANTOPRAZOLE SODIUM 40 MILLIGRAM(S): 20 TABLET, DELAYED RELEASE ORAL at 05:42

## 2017-09-28 RX ADMIN — GABAPENTIN 100 MILLIGRAM(S): 400 CAPSULE ORAL at 12:03

## 2017-09-28 RX ADMIN — Medication 100 MILLIGRAM(S): at 05:42

## 2017-09-28 RX ADMIN — OXYCODONE AND ACETAMINOPHEN 2 TABLET(S): 5; 325 TABLET ORAL at 16:09

## 2017-09-28 RX ADMIN — OXYCODONE AND ACETAMINOPHEN 2 TABLET(S): 5; 325 TABLET ORAL at 21:42

## 2017-09-28 RX ADMIN — OXYCODONE AND ACETAMINOPHEN 2 TABLET(S): 5; 325 TABLET ORAL at 12:03

## 2017-09-28 RX ADMIN — HEPARIN SODIUM 5000 UNIT(S): 5000 INJECTION INTRAVENOUS; SUBCUTANEOUS at 16:05

## 2017-09-28 NOTE — PROVIDER CONTACT NOTE (OTHER) - ASSESSMENT
pt heart rate 108, pt complaining of pain, pain meds given, provider notified, will continue to monitor.

## 2017-09-28 NOTE — PROGRESS NOTE ADULT - SUBJECTIVE AND OBJECTIVE BOX
Subjective: "I had pain in my side, a little better with the Percocets." States improved SOB. No issues overnight, getting OOB.     Vital Signs:  Vital Signs Last 24 Hrs  T(C): 37.4 (09-28-17 @ 07:37), Max: 37.5 (09-27-17 @ 19:34)  T(F): 99.4 (09-28-17 @ 07:37), Max: 99.5 (09-27-17 @ 19:34)  HR: 108 (09-28-17 @ 07:37) (70 - 108)  BP: 135/70 (09-28-17 @ 07:37) (113/68 - 157/91)  RR: 18 (09-28-17 @ 07:37) (15 - 21)  SpO2: 96% (09-28-17 @ 07:37) (95% - 99%) on (O2)    Telemetry/Alarms:  General: WN/WD NAD  Neurology: Awake, nonfocal, ARELLANO x 4  Eyes: Scleras clear, PERRLA/ EOMI, Gross vision intact  ENT:Gross hearing intact, grossly patent pharynx, no stridor  Neck: Neck supple, trachea midline, No JVD,   Respiratory: slight decrease Left No wheezing, rales, rhonchi  CV: RRR, S1S2, no murmurs, rubs or gallops  Abdominal: Soft, NT, ND +BS, no BM, +void  Extremities: No edema, + peripheral pulses  Skin: No Rashes, Hematoma, Ecchymosis  Lymphatic: No Neck, axilla, groin LAD  Psych: Oriented x 3, normal affect  Incisions: Left VATS c/d/i  Tubes: Left Pleurx to WS, 340cc since OR, No AL  Relevant labs, radiology and Medications reviewed           CXR improved left effusion.              13.7   10.95 )-----------( 302      ( 28 Sep 2017 06:00 )             40.6     09-28    132<L>  |  94<L>  |  12  ----------------------------<  113<H>  4.4   |  25  |  0.79    Ca    8.4      28 Sep 2017 06:00        MEDICATIONS  (STANDING):  heparin  Injectable 5000 Unit(s) SubCutaneous every 8 hours  docusate sodium 100 milliGRAM(s) Oral three times a day  senna 2 Tablet(s) Oral at bedtime  pantoprazole    Tablet 40 milliGRAM(s) Oral before breakfast    MEDICATIONS  (PRN):  acetaminophen   Tablet. 650 milliGRAM(s) Oral every 6 hours PRN Mild Pain (1 - 3)  ondansetron Injectable 4 milliGRAM(s) IV Push once PRN Nausea and/or Vomiting  HYDROmorphone  Injectable 1 milliGRAM(s) IV Push every 4 hours PRN Severe Pain (7 - 10)  oxyCODONE    5 mG/acetaminophen 325 mG 1 Tablet(s) Oral every 4 hours PRN Moderate Pain (4 - 6)  oxyCODONE    5 mG/acetaminophen 325 mG 2 Tablet(s) Oral every 4 hours PRN Severe Pain (7 - 10)    Pertinent Physical Exam  I&O's Summary    27 Sep 2017 07:01  -  28 Sep 2017 07:00  --------------------------------------------------------  IN: 190 mL / OUT: 1410 mL / NET: -1220 mL    Social: , former smoker. Lives with wife.     Assessment  46y Male  w/ PAST MEDICAL & SURGICAL HISTORY:  No pertinent past medical history  No significant past surgical history  admitted with complaints of Patient is a 46y old  Male who presents with a chief complaint of Recurrent left pleural effusion and left hilar mass (25 Sep 2017 20:37)  This 46 year old male was transferred from [facility] where he had been treated for a left pleural effusion and PNA since his admission on 9/17/17.  He has been complaining of left chest pain for 5 months.  The pain is dull and intermittent.  It is worse with coughing and when twisting his back to the right or left.  The pain radiates from the left lateral chest to the left axilla and to the left shoulder.  He was seen at the hospital and at his doctor's office for the pain.  A CXR had been done about 5 months ago and as it was negative, so he was given NSAIDS and muscle relaxants.  A CXR on 8/20/17 was also negative.  His cough worsened over the month after that and he began producing clear phlegm.  He went to the hospital on 9/17 and a CXR on that day showed a large left pleural effusion with LLL atelectasis and possible DAVE infiltrate.  He underwent an ultrasound-guided thoracentesis on 9/18 and then again on 9/22/17.  The first one drained 1.5 L and the second time drained 1.7 L.  Both were straw-colored and non-bloody.  Cytology was sent to [facility] and is still pending.  Pleural fluid cultures also sent to [facility] were negative.  A CT scan on 9/19 between the two drainages showed a large fluid collection with associated almost complete LLL atelectasis and minimal atelectasis in the DAVE and lingula. He was treated for the DAVE PNA with a course of Avelox.  The CT scan also showed right hilar and extensive mediastinal adenopathy, a small RLL nodule, and a left hilar mass encasing the LLL bronchus and encasing and occluding the LLL pulmonary artery. He was sent to [facility] in order to obtain a biopsy of the mass and because the most recent CXR from 9/24 showed that the effusion was rapidly reaccumulating since the CXR on 9/22.  He denies hemoptysis.  He ruled out for TB at [facility] with three negative sputums and a negative Quantiferon gold result.  He states that an HIV test was negative 2 months ago.  He has a history of recent travel to the [place] for two weeks about 2 months ago. He is also originally from [place].  Additionally he smoked about a quarter pack of cigarettes for over twenty years until he quit 6 months ago.  Cytology from OSH negative for malignant cells. On 9/27- Had left VATS, pleurx and pleural bx for malignant pleural efusion. . 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     PLAN  Neuro: Pain management. will cont Oral regiment. Will add neurontin. Pain consult as needed.   Pulm: Encourage coughing, deep breathing and use of incentive spirometry. Wean off supplemental oxygen as able. Daily CXR.   Cardio: Monitor telemetry/alarms  GI: Tolerating diet. Continue stool softeners.  Renal: monitor urine output, supplement electrolytes as needed  Vasc: Heparin SC/SCDs for DVT prophylaxis  Heme: Stable H/H. .   ID: Off antibiotics. Stable.  Therapy: OOB/ambulate  Tubes: Monitor Chest tube output, will cap Pleurx today. D/c home when Pleurx approved by insurance and VNS arranged. Will start [employer] w/u while inhouse.   Disposition: Aim to D/C to home in next 24-48hrs.   Discussed with Cardiothoracic Team at AM rounds.

## 2017-09-28 NOTE — PROGRESS NOTE ADULT - SUBJECTIVE AND OBJECTIVE BOX
ANESTHESIA POSTOP CHECK    46y Male POSTOP DAY 1 S/P     Vital Signs Last 24 Hrs  T(C): 37.4 (28 Sep 2017 07:37), Max: 37.5 (27 Sep 2017 19:34)  T(F): 99.4 (28 Sep 2017 07:37), Max: 99.5 (27 Sep 2017 19:34)  HR: 108 (28 Sep 2017 07:37) (70 - 108)  BP: 135/70 (28 Sep 2017 07:37) (113/68 - 157/91)  BP(mean): --  RR: 18 (28 Sep 2017 07:37) (15 - 21)  SpO2: 96% (28 Sep 2017 07:37) (95% - 99%)  I&O's Summary    27 Sep 2017 07:01  -  28 Sep 2017 07:00  --------------------------------------------------------  IN: 190 mL / OUT: 1410 mL / NET: -1220 mL        [X ] NO APPARENT ANESTHESIA COMPLICATIONS      Comments:

## 2017-09-29 ENCOUNTER — TRANSCRIPTION ENCOUNTER (OUTPATIENT)
Age: 47
End: 2017-09-29

## 2017-09-29 DIAGNOSIS — R91.8 OTHER NONSPECIFIC ABNORMAL FINDING OF LUNG FIELD: ICD-10-CM

## 2017-09-29 PROCEDURE — 99233 SBSQ HOSP IP/OBS HIGH 50: CPT | Mod: GC

## 2017-09-29 PROCEDURE — 71010: CPT | Mod: 26

## 2017-09-29 RX ORDER — OXYCODONE HYDROCHLORIDE 5 MG/1
10 TABLET ORAL EVERY 12 HOURS
Qty: 0 | Refills: 0 | Status: DISCONTINUED | OUTPATIENT
Start: 2017-09-29 | End: 2017-09-30

## 2017-09-29 RX ORDER — OXYCODONE HYDROCHLORIDE 5 MG/1
10 TABLET ORAL EVERY 12 HOURS
Qty: 0 | Refills: 0 | Status: DISCONTINUED | OUTPATIENT
Start: 2017-09-29 | End: 2017-09-29

## 2017-09-29 RX ADMIN — OXYCODONE HYDROCHLORIDE 10 MILLIGRAM(S): 5 TABLET ORAL at 21:46

## 2017-09-29 RX ADMIN — OXYCODONE HYDROCHLORIDE 10 MILLIGRAM(S): 5 TABLET ORAL at 11:14

## 2017-09-29 RX ADMIN — OXYCODONE AND ACETAMINOPHEN 2 TABLET(S): 5; 325 TABLET ORAL at 20:37

## 2017-09-29 RX ADMIN — Medication 100 MILLIGRAM(S): at 21:46

## 2017-09-29 RX ADMIN — HEPARIN SODIUM 5000 UNIT(S): 5000 INJECTION INTRAVENOUS; SUBCUTANEOUS at 14:12

## 2017-09-29 RX ADMIN — OXYCODONE AND ACETAMINOPHEN 2 TABLET(S): 5; 325 TABLET ORAL at 09:01

## 2017-09-29 RX ADMIN — PANTOPRAZOLE SODIUM 40 MILLIGRAM(S): 20 TABLET, DELAYED RELEASE ORAL at 05:15

## 2017-09-29 RX ADMIN — OXYCODONE AND ACETAMINOPHEN 2 TABLET(S): 5; 325 TABLET ORAL at 14:11

## 2017-09-29 RX ADMIN — Medication 100 MILLIGRAM(S): at 05:15

## 2017-09-29 RX ADMIN — HEPARIN SODIUM 5000 UNIT(S): 5000 INJECTION INTRAVENOUS; SUBCUTANEOUS at 05:15

## 2017-09-29 RX ADMIN — GABAPENTIN 100 MILLIGRAM(S): 400 CAPSULE ORAL at 14:12

## 2017-09-29 RX ADMIN — OXYCODONE AND ACETAMINOPHEN 2 TABLET(S): 5; 325 TABLET ORAL at 14:45

## 2017-09-29 RX ADMIN — HEPARIN SODIUM 5000 UNIT(S): 5000 INJECTION INTRAVENOUS; SUBCUTANEOUS at 21:46

## 2017-09-29 RX ADMIN — OXYCODONE AND ACETAMINOPHEN 2 TABLET(S): 5; 325 TABLET ORAL at 03:15

## 2017-09-29 RX ADMIN — OXYCODONE AND ACETAMINOPHEN 2 TABLET(S): 5; 325 TABLET ORAL at 19:37

## 2017-09-29 RX ADMIN — OXYCODONE HYDROCHLORIDE 10 MILLIGRAM(S): 5 TABLET ORAL at 09:54

## 2017-09-29 RX ADMIN — GABAPENTIN 100 MILLIGRAM(S): 400 CAPSULE ORAL at 21:46

## 2017-09-29 RX ADMIN — GABAPENTIN 100 MILLIGRAM(S): 400 CAPSULE ORAL at 05:15

## 2017-09-29 RX ADMIN — OXYCODONE HYDROCHLORIDE 10 MILLIGRAM(S): 5 TABLET ORAL at 22:46

## 2017-09-29 RX ADMIN — OXYCODONE AND ACETAMINOPHEN 2 TABLET(S): 5; 325 TABLET ORAL at 08:12

## 2017-09-29 RX ADMIN — Medication 100 MILLIGRAM(S): at 14:12

## 2017-09-29 RX ADMIN — SENNA PLUS 2 TABLET(S): 8.6 TABLET ORAL at 21:46

## 2017-09-29 RX ADMIN — OXYCODONE AND ACETAMINOPHEN 2 TABLET(S): 5; 325 TABLET ORAL at 03:20

## 2017-09-29 NOTE — CHART NOTE - NSCHARTNOTEFT_GEN_A_CORE
Mr Delgadillo is a 46 year old gentleman who recently underwent a biopsy of a lung mass. Per documentation, frozen section has showed non small cell carcinoma (final path not available). Patient was found to have a ring enhancing lesion within the right precentral gyrus measuring 0.5-0.6 cm. This might be treated with radiation. As such, patient was provided an appointment with Dr. Manuel, for 10/5/17 at 10 AM at [facility] ([address] [place] [address], [place], [place], [address]). Contact information exchanged with the patient. Mr Leona is a 46 year old gentleman who recently underwent a biopsy of a lung mass. Per documentation, frozen section has showed non small cell carcinoma (final path not available). Patient was found to have a ring enhancing lesion within the right precentral gyrus measuring 0.5-0.6 cm. Given the histology and size, he would be an appropriate candidate for outpatient SRS. As such, patient was provided an outpatient consultation appointment with my colleague, Dr. Nilton Manuel, for 10/5/17 at 10 AM at [facility] ([address] [place] [address], [address]). Contact information exchanged with the patient.

## 2017-09-29 NOTE — DISCHARGE NOTE ADULT - PROVIDER TOKENS
FREE:[LAST:[Trista],FIRST:[Teresa],PHONE:[phone]],FAX:[phone]],ADDRESS:[[facility]  Oncology [facility]  Level C]]

## 2017-09-29 NOTE — CONSULT NOTE ADULT - PROBLEM SELECTOR RECOMMENDATION 9
Awaiting official pathology report of pleural bx, however prelim frozen path is positive for non-small cell carcinoma.   - In light of new brain lesion, would recommend evaluation by radiation oncology for possible RT.   - Patient can follow up outpatient at [facility] to establish care.     Further recommendations to follow. Awaiting official pathology report of pleural bx, however prelim frozen path is positive for non-small cell carcinoma.   - In light of new brain lesion, would recommend evaluation by radiation oncology for possible RT. Patient provided with outpatient followup appt with Rad onc.   - Patient can follow up outpatient at [facility] to establish care. Will set patient up with followup appointment post-discharge.

## 2017-09-29 NOTE — DISCHARGE NOTE ADULT - NS AS ACTIVITY OBS
Walking-Outdoors allowed/No Heavy lifting/straining/Stairs allowed/Showering allowed/Walking-Indoors allowed/Do not drive or operate machinery

## 2017-09-29 NOTE — PROGRESS NOTE ADULT - SUBJECTIVE AND OBJECTIVE BOX
Subjective:    Vital Signs:  Vital Signs Last 24 Hrs  T(C): 37 (09-29-17 @ 08:00), Max: 37.1 (09-28-17 @ 16:00)  T(F): 98.6 (09-29-17 @ 08:00), Max: 98.7 (09-28-17 @ 16:00)  HR: 92 (09-29-17 @ 08:00) (85 - 100)  BP: 121/68 (09-29-17 @ 08:00) (110/64 - 125/75)  RR: 17 (09-29-17 @ 05:02) (16 - 18)  SpO2: 97% (09-29-17 @ 08:00) (95% - 97%) on (O2)    Telemetry/Alarms:  General: WN/WD NAD  Neurology: Awake, nonfocal, ARELLANO x 4  Eyes: Scleras clear, PERRLA/ EOMI, Gross vision intact  ENT:Gross hearing intact, grossly patent pharynx, no stridor  Neck: Neck supple, trachea midline, No JVD,   Respiratory: CTA B/L, No wheezing, rales, rhonchi  CV: RRR, S1S2, no murmurs, rubs or gallops  Abdominal: Soft, NT, ND +BS,   Extremities: No edema, + peripheral pulses  Skin: No Rashes, Hematoma, Ecchymosis  Lymphatic: No Neck, axilla, groin LAD  Psych: Oriented x 3, normal affect  Incisions:   Tubes:  Relevant labs, radiology and Medications reviewed                        13.7   10.95 )-----------( 302      ( 28 Sep 2017 06:00 )             40.6     09-28    132<L>  |  94<L>  |  12  ----------------------------<  113<H>  4.4   |  25  |  0.79    Ca    8.4      28 Sep 2017 06:00        MEDICATIONS  (STANDING):  heparin  Injectable 5000 Unit(s) SubCutaneous every 8 hours  docusate sodium 100 milliGRAM(s) Oral three times a day  senna 2 Tablet(s) Oral at bedtime  pantoprazole    Tablet 40 milliGRAM(s) Oral before breakfast  gabapentin 100 milliGRAM(s) Oral every 8 hours  oxyCODONE  ER Tablet 10 milliGRAM(s) Oral every 12 hours    MEDICATIONS  (PRN):  acetaminophen   Tablet. 650 milliGRAM(s) Oral every 6 hours PRN Mild Pain (1 - 3)  ondansetron Injectable 4 milliGRAM(s) IV Push once PRN Nausea and/or Vomiting  HYDROmorphone  Injectable 1 milliGRAM(s) IV Push every 4 hours PRN Severe Pain (7 - 10)  oxyCODONE    5 mG/acetaminophen 325 mG 1 Tablet(s) Oral every 4 hours PRN Moderate Pain (4 - 6)  oxyCODONE    5 mG/acetaminophen 325 mG 2 Tablet(s) Oral every 4 hours PRN Severe Pain (7 - 10)    Pertinent Physical Exam  I&O's Summary    28 Sep 2017 07:01  -  29 Sep 2017 07:00  --------------------------------------------------------  IN: 350 mL / OUT: 930 mL / NET: -580 mL        Assessment  46y Male  w/ PAST MEDICAL & SURGICAL HISTORY:  No pertinent past medical history  No significant past surgical history  admitted with complaints of Patient is a 46y old  Male who presents with a chief complaint of Recurrent left pleural effusion and left hilar mass (29 Sep 2017 04:07)  .  On (Date), patient underwent Thoracoscopic biopsy of pleura  . Postoperative course/issues:    PLAN  Neuro: Pain management  Pulm: Encourage coughing, deep breathing and use of incentive spirometry. Wean off supplemental oxygen as able. Daily CXR.   Cardio: Monitor telemetry/alarms  GI: Tolerating diet. Continue stool softeners.  Renal: monitor urine output, supplement electrolytes as needed  Vasc: Heparin SC/SCDs for DVT prophylaxis  Heme: Stable H/H. .   ID: Off antibiotics. Stable.  Therapy: OOB/ambulate  Tubes: Monitor Chest tube output  Disposition: Aim to D/C to home on  Discussed with Cardiothoracic Team at AM rounds. Subjective:    Vital Signs: Hemodynamically stable and afebrile  Vital Signs Last 24 Hrs  T(C): 37 (09-29-17 @ 08:00), Max: 37.1 (09-28-17 @ 16:00)  T(F): 98.6 (09-29-17 @ 08:00), Max: 98.7 (09-28-17 @ 16:00)  HR: 92 (09-29-17 @ 08:00) (85 - 100)  BP: 121/68 (09-29-17 @ 08:00) (110/64 - 125/75)  RR: 17 (09-29-17 @ 05:02) (16 - 18)  SpO2: 97% (09-29-17 @ 08:00) (95% - 97%) on 21%O2    Telemetry/Alarms: NSR  General: WN/WD NAD  Neurology: Awake, nonfocal, ARELLANO x 4  Eyes: Scleras clear, PERRLA/ EOMI, Gross vision intact  ENT:Gross hearing intact, grossly patent pharynx, no stridor  Neck: Neck supple, trachea midline, No JVD,   Respiratory: CTA B/L, No wheezing, rales, rhonchi  CV: RRR, S1S2, no murmurs, rubs or gallops  Abdominal: Soft, NT, ND +BS,   Extremities: No edema, + peripheral pulses, no calf pain or tenderness.  Skin: No Rashes, Hematoma, Ecchymosis  Lymphatic: No Neck, axilla, groin LAD  Psych: Oriented x 3, normal affect  Incisions: no s-s of infectiion  Tubes: Lt pleurX cath->80ml/24hrs  Relevant labs, radiology->Small left pleural effusion. 2. Unchanged trace left apical pneumothorax. and Medications reviewed                          13.7   10.95 )-----------( 302      ( 28 Sep 2017 06:00 )             40.6     09-28    132<L>  |  94<L>  |  12  ----------------------------<  113<H>  4.4   |  25  |  0.79    Ca    8.4      28 Sep 2017 06:00        MEDICATIONS  (STANDING):  heparin  Injectable 5000 Unit(s) SubCutaneous every 8 hours  docusate sodium 100 milliGRAM(s) Oral three times a day  senna 2 Tablet(s) Oral at bedtime  pantoprazole    Tablet 40 milliGRAM(s) Oral before breakfast  gabapentin 100 milliGRAM(s) Oral every 8 hours  oxyCODONE  ER Tablet 10 milliGRAM(s) Oral every 12 hours    MEDICATIONS  (PRN):  acetaminophen   Tablet. 650 milliGRAM(s) Oral every 6 hours PRN Mild Pain (1 - 3)  ondansetron Injectable 4 milliGRAM(s) IV Push once PRN Nausea and/or Vomiting  HYDROmorphone  Injectable 1 milliGRAM(s) IV Push every 4 hours PRN Severe Pain (7 - 10)  oxyCODONE    5 mG/acetaminophen 325 mG 1 Tablet(s) Oral every 4 hours PRN Moderate Pain (4 - 6)  oxyCODONE    5 mG/acetaminophen 325 mG 2 Tablet(s) Oral every 4 hours PRN Severe Pain (7 - 10)    Pertinent Physical Exam  I&O's Summary    28 Sep 2017 07:01  -  29 Sep 2017 07:00  --------------------------------------------------------  IN: 350 mL / OUT: 930 mL / NET: -580 mL        Assessment  46y Male  w/ PAST MEDICAL & SURGICAL HISTORY:  No pertinent past medical history  No significant past surgical history  admitted with complaints of Patient is a 46y old  Male who presents with a chief complaint of Recurrent left pleural effusion and left hilar mass (29 Sep 2017 04:07)  .  On 9/27/17, patient underwent Thoracoscopic biopsy of pleura-> Lt Vatspleural bx,and drainage of 400ml effusion  Postoperative course/issues:    PLAN  Neuro: Pain management  Pulm: Encourage coughing, deep breathing and use of incentive spirometry. Wean off supplemental oxygen as able. Daily CXR.   Cardio: Monitor telemetry/alarms  GI: Tolerating diet. Continue stool softeners.  Renal: monitor urine output, supplement electrolytes as needed  Vasc: Heparin SC/SCDs for DVT prophylaxis  Heme: Stable H/H. .   ID: Off antibiotics. Stable.  Therapy: OOB/ambulate  Tubes: Monitor Chest tube output  Disposition: Aim to D/C to home on  Discussed with Cardiothoracic Team at AM rounds. Subjective:    Vital Signs: Hemodynamically stable and afebrile  Vital Signs Last 24 Hrs  T(C): 37 (09-29-17 @ 08:00), Max: 37.1 (09-28-17 @ 16:00)  T(F): 98.6 (09-29-17 @ 08:00), Max: 98.7 (09-28-17 @ 16:00)  HR: 92 (09-29-17 @ 08:00) (85 - 100)  BP: 121/68 (09-29-17 @ 08:00) (110/64 - 125/75)  RR: 17 (09-29-17 @ 05:02) (16 - 18)  SpO2: 97% (09-29-17 @ 08:00) (95% - 97%) on 21%O2    Telemetry/Alarms: NSR  General: WN/WD NAD  Neurology: Awake, nonfocal, ARELLANO x 4  Eyes: Scleras clear, PERRLA/ EOMI, Gross vision intact  ENT:Gross hearing intact, grossly patent pharynx, no stridor  Neck: Neck supple, trachea midline, No JVD,   Respiratory: CTA B/L, No wheezing, rales, rhonchi  CV: RRR, S1S2, no murmurs, rubs or gallops  Abdominal: Soft, NT, ND +BS,   Extremities: No edema, + peripheral pulses, no calf pain or tenderness.  Skin: No Rashes, Hematoma, Ecchymosis  Lymphatic: No Neck, axilla, groin LAD  Psych: Oriented x 3, normal affect  Incisions: no s-s of infectiion  Tubes: Lt pleurX cath->80ml/24hrs  Relevant labs, radiology->Small left pleural effusion. 2. Unchanged trace left apical pneumothorax. and Medications reviewed                          13.7   10.95 )-----------( 302      ( 28 Sep 2017 06:00 )             40.6     09-28    132<L>  |  94<L>  |  12  ----------------------------<  113<H>  4.4   |  25  |  0.79    Ca    8.4      28 Sep 2017 06:00        MEDICATIONS  (STANDING):  heparin  Injectable 5000 Unit(s) SubCutaneous every 8 hours  docusate sodium 100 milliGRAM(s) Oral three times a day  senna 2 Tablet(s) Oral at bedtime  pantoprazole    Tablet 40 milliGRAM(s) Oral before breakfast  gabapentin 100 milliGRAM(s) Oral every 8 hours  oxyCODONE  ER Tablet 10 milliGRAM(s) Oral every 12 hours    MEDICATIONS  (PRN):  acetaminophen   Tablet. 650 milliGRAM(s) Oral every 6 hours PRN Mild Pain (1 - 3)  ondansetron Injectable 4 milliGRAM(s) IV Push once PRN Nausea and/or Vomiting  HYDROmorphone  Injectable 1 milliGRAM(s) IV Push every 4 hours PRN Severe Pain (7 - 10)  oxyCODONE    5 mG/acetaminophen 325 mG 1 Tablet(s) Oral every 4 hours PRN Moderate Pain (4 - 6)  oxyCODONE    5 mG/acetaminophen 325 mG 2 Tablet(s) Oral every 4 hours PRN Severe Pain (7 - 10)    Pertinent Physical Exam  I&O's Summary    28 Sep 2017 07:01  -  29 Sep 2017 07:00  --------------------------------------------------------  IN: 350 mL / OUT: 930 mL / NET: -580 mL        Assessment  46y Male  w/ PAST MEDICAL & SURGICAL HISTORY:  No pertinent past medical history  No significant past surgical history  admitted with complaints of Patient is a 46y old  Male who presents with a chief complaint of Recurrent left pleural effusion and left hilar mass (29 Sep 2017 04:07)  .  On 9/27/17, patient underwent Thoracoscopic biopsy of pleura-> Lt Vatspleural bx,and drainage of 400ml effusion  Postoperative course/issues: Uneventful. Seen by oncology and radiation oc for outpatient follow up     PLAN  Neuro: Pain management  Pulm: Encourage coughing, deep breathing and use of incentive spirometry.  Daily CXR.   Cardio: Monitor telemetry/alarms  GI: Tolerating diet. Continue stool softeners.  Renal: monitor urine output, supplement electrolytes as needed  Vasc: Heparin SC/SCDs for DVT prophylaxis  Heme: Stable H/H. .   ID: Off antibiotics. Stable.  Therapy: OOB/ambulate  Tubes: Monitor Chest tube output  Disposition: Aim to D/C to home on 9/30/17  Discussed with Cardiothoracic Team at AM rounds.

## 2017-09-29 NOTE — DISCHARGE NOTE ADULT - MEDICATION SUMMARY - MEDICATIONS TO TAKE
I will START or STAY ON the medications listed below when I get home from the hospital:    acetaminophen 325 mg oral tablet  -- 2 tab(s) by mouth every 6 hours, As needed, Mild Pain (1 - 3)  -- Indication: For Pain control    oxyCODONE-acetaminophen 5 mg-325 mg oral tablet  -- 1 tab(s) by mouth every 4 hours, As needed, Moderate Pain (4 - 6)  -- Indication: For Pain control    gabapentin 100 mg oral capsule  -- 1 cap(s) by mouth every 8 hours, As Needed -for severe pain   -- Indication: For Pain control    docusate sodium 100 mg oral capsule  -- 1 cap(s) by mouth 3 times a day  -- Indication: For stool softner    senna oral tablet  -- 2 tab(s) by mouth once a day (at bedtime)  -- Indication: For Laxative I will START or STAY ON the medications listed below when I get home from the hospital:    acetaminophen 325 mg oral tablet  -- 2 tab(s) by mouth every 6 hours, As needed, Mild Pain (1 - 3)  -- Indication: For Pain control    oxyCODONE-acetaminophen 5 mg-325 mg oral tablet  -- 1 tab(s) by mouth every 4 hours, As Needed -Moderate Pain (4 - 6) - for severe pain MDD:6  -- Indication: For Pain control    gabapentin 100 mg oral capsule  -- 1 cap(s) by mouth every 8 hours, As Needed -for severe pain   -- Indication: For Pain control    docusate sodium 100 mg oral capsule  -- 1 cap(s) by mouth 3 times a day  -- Indication: For stool softner    senna oral tablet  -- 2 tab(s) by mouth once a day (at bedtime)  -- Indication: For Laxative

## 2017-09-29 NOTE — DISCHARGE NOTE ADULT - HOSPITAL COURSE
This 46 year old male was transferred from [facility] where he had been treated for a left pleural effusion and PNA since his admission on 9/17/17.  He has been complaining of left chest pain for 5 months.  The pain is dull and intermittent.  It is worse with coughing and when twisting his back to the right or left.  The pain radiates from the left lateral chest to the left axilla and to the left shoulder.  He was seen at the hospital and at his doctor's office for the pain.  A CXR had been done about 5 months ago and as it was negative, so he was given NSAIDS and muscle relaxants.  A CXR on 8/20/17 was also negative.  His cough worsened over the month after that and he began producing clear phlegm.  He went to the hospital on 9/17 and a CXR on that day showed a large left pleural effusion with LLL atelectasis and possible DAVE infiltrate.  He underwent an ultrasound-guided thoracentesis on 9/18 and then again on 9/22/17.  The first one drained 1.5 L and the second time drained 1.7 L.  Both were straw-colored and non-bloody.  Cytology was sent to [facility] and is still pending.  Pleural fluid cultures also sent to [facility] were negative.  A CT scan on 9/19 between the two drainages showed a large fluid collection with associated almost complete LLL atelectasis and minimal atelectasis in the DAVE and lingula. He was treated for the DAVE PNA with a course of Avelox.  The CT scan also showed right hilar and extensive mediastinal adenopathy, a small RLL nodule, and a left hilar mass encasing the LLL bronchus and encasing and occluding the LLL pulmonary artery. He was sent to [facility] in order to obtain a biopsy of the mass and because the most recent CXR from 9/24 showed that the effusion was rapidly reaccumulating since the CXR on 9/22.  He denies hemoptysis.  He ruled out for TB at [facility] with three negative sputums and a negative Quantiferon gold result.  He states that an HIV test was negative 2 months ago.  He has a history of recent travel to the [place] for two weeks about 2 months ago. He is also originally from [place].  Additionally he smoked about a quarter pack of cigarettes for over twenty years until he quit 6 months ago.  On 9/27/17 he underwent a left VATS, pleural bx, drainage of effusion, and placement of a PleurX catheter.  Frozen section showed non-small cell carcinoma but the final pathology was unavailable at discharge time.  His PleurX was capped and he was for discharge as soon as home care was arranged.

## 2017-09-29 NOTE — DISCHARGE NOTE ADULT - PLAN OF CARE
wound healing; follow up final pathology for treatment plan Follow up with Dr Weston in 2 weeks; Follow up with Dr Weston in 2 weeks;  Please follow with radiation oncology as instructed  Please follow with palliative medicine for pain control Follow up with Dr Weston in 2 weeks;  Please follow with radiation oncology as instructed  Please follow with palliative medicine for pain control - Dr Sofi Briscoe-[address]; [phone]. She is [place]-speaking.

## 2017-09-29 NOTE — CONSULT NOTE ADULT - SUBJECTIVE AND OBJECTIVE BOX
REASON FOR CONSULTATION: Pt with left lung mass and concern for metastatic disease    INTERVAL HISTORY: 45 y/o Prydeinig speaking Man transferred from [facility] 4 days ago where he was treated for a left pleural effusion and pneumonia (admitted to OSH on 9/17). Pt presented to OSH with chest pain, dull and intermittent for 5 months involving the left lateral chest, left axilla and left shoulder. ON 9/17, pt was found on CXR to have a large left pleural effusion s/p thoracentesis on 9/18 and 9/22. Cytology showed no malignant cells. CT chest at OSH showed Right hilar and extensive mediastinal adenopathy, small RLL nodule, Left hilar mass encasing the LLL bronchus and encasing and occluding the LLL pulmonary artery. Pt was transferred to [facility] for further care and biopsy of this mass. Per documentation, frozen section has showed non small cell carcinoma, however a final path report is not yet available. Pt was found to have on MRI brain on 9/28 a ring enhancing lesion within the R precentral gyrus cortex measuring 0.5-0.6cm suspicious for metastatic disease.     Patient seen and examined at the bedside.  ID #[id number] was used. Patient states that he is feeling well and does not have a chest pain, except for the area around the chest tube. He is also still having a cough with clear sputum. He denies any fevers, chills, nausea, vomiting, abdominal pain, or leg swelling. He is ambulating without any issues. He is willing to pursue all treatments in the future for his malignancy.       Allergies    ampicillin (Rash)    Intolerances        MEDICATIONS  (STANDING):  heparin  Injectable 5000 Unit(s) SubCutaneous every 8 hours  docusate sodium 100 milliGRAM(s) Oral three times a day  senna 2 Tablet(s) Oral at bedtime  pantoprazole    Tablet 40 milliGRAM(s) Oral before breakfast  gabapentin 100 milliGRAM(s) Oral every 8 hours  oxyCODONE  ER Tablet 10 milliGRAM(s) Oral every 12 hours    MEDICATIONS  (PRN):  acetaminophen   Tablet. 650 milliGRAM(s) Oral every 6 hours PRN Mild Pain (1 - 3)  ondansetron Injectable 4 milliGRAM(s) IV Push once PRN Nausea and/or Vomiting  HYDROmorphone  Injectable 1 milliGRAM(s) IV Push every 4 hours PRN Severe Pain (7 - 10)  oxyCODONE    5 mG/acetaminophen 325 mG 1 Tablet(s) Oral every 4 hours PRN Moderate Pain (4 - 6)  oxyCODONE    5 mG/acetaminophen 325 mG 2 Tablet(s) Oral every 4 hours PRN Severe Pain (7 - 10)      Vital Signs Last 24 Hrs  T(C): 37 (29 Sep 2017 08:00), Max: 37.1 (28 Sep 2017 16:00)  T(F): 98.6 (29 Sep 2017 08:00), Max: 98.7 (28 Sep 2017 16:00)  HR: 92 (29 Sep 2017 08:00) (85 - 100)  BP: 121/68 (29 Sep 2017 08:00) (110/64 - 125/75)  BP(mean): --  RR: 17 (29 Sep 2017 05:02) (16 - 18)  SpO2: 97% (29 Sep 2017 08:00) (95% - 97%)    PHYSICAL EXAM:    EYES: EOMI, PERRLA, conjunctiva and sclera clear  CHEST/LUNG: There is a pleurx catheter, capped, located in left anterior chest. Diminished breath sounds left posterior side, clear to auscultation on right. No wheezing or crackles   HEART: Regular rate and rhythm; no murmurs or rubs  ABDOMEN: Soft, Nontender, Nondistended;   Extremities Motor strength and sensation intact in all extremities. No peripheral edema.         LABS:                        13.7   10.95 )-----------( 302      ( 28 Sep 2017 06:00 )             40.6     09-28    132<L>  |  94<L>  |  12  ----------------------------<  113<H>  4.4   |  25  |  0.79    Ca    8.4      28 Sep 2017 06:00              RADIOLOGY & ADDITIONAL STUDIES:    < from: MRI Head w/wo Cont (09.28.17 @ 15:23) >    EXAM:  MRI BRAIN W-W O CONTRAST        PROCEDURE DATE:  Sep 28 2017         INTERPRETATION:  History: eval for mets  eval for mets 47yo w lung CA    lung cancer.ADMDIAG1: R09.1 PLEURISY/    Technique: MRI of the brain was performed with and withoutcontrast    Sagittal and axial T1, axial T2, FLAIR, SWI, diffusion-weighted images   and an ADC map were obtained.Contrast enhanced axial T1, T1 BRAVO   sequences were obtained. Coronal and sagittal reformations were made from   axial BRAVO imaging.    6 mls of Gadavist was administered intravenously without complication and   1.5 mls were discarded.    COMPARISON: None.    FINDINGS:  There is a ring-enhancing lesion within the right precentral gyrus cortex   measuring 0.5 x 0.6 cm highly worrisome for metastatic disease given the   patient's history. There is little associated parenchymal vasogenic edema.    There are no additional foci of abnormal enhancement within the brain.    The ventricles and sulci are otherwise within normal limits. There are   otherwise nonenhancing white matter foci of hyperintense T2/FLAIR signal   abnormality in the brain which may represent minimal microvascular   ischemic change. There is no intraparenchymal hematoma, mass effect or   midline shift. No abnormal extra-axial fluid collections are present.   There is no diffusion abnormality to suggest acute or subacute   infarction. Major flow-voids at the base of the brain follow expected   course and contour.    The calvarium is intact. The visualized intraorbital compartments,   paranasal sinuses and tympanomastoid cavities appear free of acute   disease.      IMPRESSION:  Left precentral gyrus ring-enhancing cortical lesion measuring 0.5 x 0.6   cm, suspicious for metastatic disease.  Minor microvascular ischemic change.    < end of copied text >      PATHOLOGY:

## 2017-09-29 NOTE — DISCHARGE NOTE ADULT - INSTRUCTIONS
regular diet Take medications as prescribed. Follow up with your doctor as directed. Shower daily with mild soap & water, pat sites dry. No wash cloth on incisions. No cream, lotion or oils on wounds. Increase activity slowly, as tolerated. Continue to use your spirometer and perform  your deep breathing & coughing exercises. No driving while on pain medication, no heavy lifting . Call your doctor for fever over 101 degrees, pain not relieved by pain medication or for any questions or concerns you may have. Remember to have a chest  x ray before you see the doctor & bring a copy of films with you to your appointment.You may have  chest X RAY @ [facility] [phone].  Please call 911 for chest pain, shortness of breath or for signs & symptoms of stroke.

## 2017-09-29 NOTE — DISCHARGE NOTE ADULT - CARE PLAN
Principal Discharge DX:	Lung cancer  Goal:	wound healing; follow up final pathology for treatment plan  Instructions for follow-up, activity and diet:	Follow up with Dr Weston in 2 weeks; Principal Discharge DX:	Lung cancer  Goal:	wound healing; follow up final pathology for treatment plan  Instructions for follow-up, activity and diet:	Follow up with Dr Weston in 2 weeks;  Please follow with radiation oncology as instructed  Please follow with palliative medicine for pain control Principal Discharge DX:	Lung cancer  Goal:	wound healing; follow up final pathology for treatment plan  Instructions for follow-up, activity and diet:	Follow up with Dr Weston in 2 weeks;  Please follow with radiation oncology as instructed  Please follow with palliative medicine for pain control - Dr Sofi Briscoe-[address]; [phone]. She is Sammarinese-speaking.

## 2017-09-29 NOTE — CONSULT NOTE ADULT - ASSESSMENT
47 y/o Slovak speaking Man transferred from [facility] for further evaluation and biopsy of left hilar mass now found to have a brain lesion concerning for metastatic disease.

## 2017-09-29 NOTE — DISCHARGE NOTE ADULT - PATIENT PORTAL LINK FT
“You can access the [facility] Patient Portal, offered by [facility], by registering with the following website: [email or web address]”

## 2017-09-29 NOTE — CONSULT NOTE ADULT - ATTENDING COMMENTS
Mr. Aureliano Mathur was seen in consultation today. The initial history and physical examination was performed by Dr. Shah, and elements of the history along with the physical examination was conducted by myself. He was transferred from [facility] and he underwent a Pleurx catheter placement. Cytology results indicate non-small cell carcinoma of the lung. An MRI of the brain was performed, revealing a ring enhancing lesion. The films were reviewed. We discussed the approach to his care. He will require stereotactic radiosurgery to the lesion and has already been given an appointment to be seen in radiation oncology. The biopsy from his Pleurx catheter is pending another studies will be performed.    He smoked approximately 4 or 5 cigarettes per day for many years and never smoked heavily. We discussed some logistics of chemotherapy treatment. No final determination that we made at this time. All questions were answered to the best of my ability and to his apparent satisfaction. Family members were present at the bedside as well.

## 2017-09-30 VITALS
SYSTOLIC BLOOD PRESSURE: 117 MMHG | DIASTOLIC BLOOD PRESSURE: 73 MMHG | HEART RATE: 83 BPM | RESPIRATION RATE: 18 BRPM | OXYGEN SATURATION: 97 % | TEMPERATURE: 98 F

## 2017-09-30 LAB
BUN SERPL-MCNC: 10 MG/DL — SIGNIFICANT CHANGE UP (ref 7–23)
CALCIUM SERPL-MCNC: 8.5 MG/DL — SIGNIFICANT CHANGE UP (ref 8.4–10.5)
CHLORIDE SERPL-SCNC: 96 MMOL/L — LOW (ref 98–107)
CO2 SERPL-SCNC: 28 MMOL/L — SIGNIFICANT CHANGE UP (ref 22–31)
CREAT SERPL-MCNC: 0.78 MG/DL — SIGNIFICANT CHANGE UP (ref 0.5–1.3)
GLUCOSE SERPL-MCNC: 103 MG/DL — HIGH (ref 70–99)
HCT VFR BLD CALC: 39.8 % — SIGNIFICANT CHANGE UP (ref 39–50)
HGB BLD-MCNC: 13.1 G/DL — SIGNIFICANT CHANGE UP (ref 13–17)
MAGNESIUM SERPL-MCNC: 2.1 MG/DL — SIGNIFICANT CHANGE UP (ref 1.6–2.6)
MCHC RBC-ENTMCNC: 29.6 PG — SIGNIFICANT CHANGE UP (ref 27–34)
MCHC RBC-ENTMCNC: 32.9 % — SIGNIFICANT CHANGE UP (ref 32–36)
MCV RBC AUTO: 90 FL — SIGNIFICANT CHANGE UP (ref 80–100)
NRBC # FLD: 0 — SIGNIFICANT CHANGE UP
PLATELET # BLD AUTO: 300 K/UL — SIGNIFICANT CHANGE UP (ref 150–400)
PMV BLD: 10.1 FL — SIGNIFICANT CHANGE UP (ref 7–13)
POTASSIUM SERPL-MCNC: 4.4 MMOL/L — SIGNIFICANT CHANGE UP (ref 3.5–5.3)
POTASSIUM SERPL-SCNC: 4.4 MMOL/L — SIGNIFICANT CHANGE UP (ref 3.5–5.3)
RBC # BLD: 4.42 M/UL — SIGNIFICANT CHANGE UP (ref 4.2–5.8)
RBC # FLD: 11.8 % — SIGNIFICANT CHANGE UP (ref 10.3–14.5)
SODIUM SERPL-SCNC: 135 MMOL/L — SIGNIFICANT CHANGE UP (ref 135–145)
WBC # BLD: 8.4 K/UL — SIGNIFICANT CHANGE UP (ref 3.8–10.5)
WBC # FLD AUTO: 8.4 K/UL — SIGNIFICANT CHANGE UP (ref 3.8–10.5)

## 2017-09-30 PROCEDURE — 71010: CPT | Mod: 26

## 2017-09-30 RX ORDER — SENNA PLUS 8.6 MG/1
2 TABLET ORAL
Qty: 0 | Refills: 0 | COMMUNITY
Start: 2017-09-30

## 2017-09-30 RX ORDER — GABAPENTIN 400 MG/1
1 CAPSULE ORAL
Qty: 42 | Refills: 0 | OUTPATIENT
Start: 2017-09-30 | End: 2017-10-14

## 2017-09-30 RX ORDER — DOCUSATE SODIUM 100 MG
1 CAPSULE ORAL
Qty: 0 | Refills: 0 | COMMUNITY
Start: 2017-09-30

## 2017-09-30 RX ORDER — ACETAMINOPHEN 500 MG
2 TABLET ORAL
Qty: 0 | Refills: 0 | COMMUNITY
Start: 2017-09-30

## 2017-09-30 RX ADMIN — OXYCODONE HYDROCHLORIDE 10 MILLIGRAM(S): 5 TABLET ORAL at 10:15

## 2017-09-30 RX ADMIN — OXYCODONE AND ACETAMINOPHEN 2 TABLET(S): 5; 325 TABLET ORAL at 00:47

## 2017-09-30 RX ADMIN — OXYCODONE AND ACETAMINOPHEN 2 TABLET(S): 5; 325 TABLET ORAL at 01:47

## 2017-09-30 RX ADMIN — OXYCODONE AND ACETAMINOPHEN 2 TABLET(S): 5; 325 TABLET ORAL at 06:31

## 2017-09-30 RX ADMIN — Medication 100 MILLIGRAM(S): at 05:31

## 2017-09-30 RX ADMIN — OXYCODONE AND ACETAMINOPHEN 2 TABLET(S): 5; 325 TABLET ORAL at 16:34

## 2017-09-30 RX ADMIN — OXYCODONE HYDROCHLORIDE 10 MILLIGRAM(S): 5 TABLET ORAL at 10:14

## 2017-09-30 RX ADMIN — OXYCODONE AND ACETAMINOPHEN 2 TABLET(S): 5; 325 TABLET ORAL at 11:04

## 2017-09-30 RX ADMIN — GABAPENTIN 100 MILLIGRAM(S): 400 CAPSULE ORAL at 05:31

## 2017-09-30 RX ADMIN — OXYCODONE AND ACETAMINOPHEN 2 TABLET(S): 5; 325 TABLET ORAL at 11:38

## 2017-09-30 RX ADMIN — HEPARIN SODIUM 5000 UNIT(S): 5000 INJECTION INTRAVENOUS; SUBCUTANEOUS at 05:31

## 2017-09-30 RX ADMIN — HEPARIN SODIUM 5000 UNIT(S): 5000 INJECTION INTRAVENOUS; SUBCUTANEOUS at 14:26

## 2017-09-30 RX ADMIN — Medication 100 MILLIGRAM(S): at 14:26

## 2017-09-30 RX ADMIN — PANTOPRAZOLE SODIUM 40 MILLIGRAM(S): 20 TABLET, DELAYED RELEASE ORAL at 05:31

## 2017-09-30 RX ADMIN — GABAPENTIN 100 MILLIGRAM(S): 400 CAPSULE ORAL at 14:26

## 2017-10-03 ENCOUNTER — OUTPATIENT (OUTPATIENT)
Dept: OUTPATIENT SERVICES | Facility: HOSPITAL | Age: 47
LOS: 1 days | Discharge: HOME | End: 2017-10-03

## 2017-10-05 ENCOUNTER — RESULT REVIEW (OUTPATIENT)
Age: 47
End: 2017-10-05

## 2017-10-05 ENCOUNTER — APPOINTMENT (OUTPATIENT)
Dept: RADIATION ONCOLOGY | Facility: CLINIC | Age: 47
End: 2017-10-05
Payer: COMMERCIAL

## 2017-10-05 ENCOUNTER — OUTPATIENT (OUTPATIENT)
Dept: OUTPATIENT SERVICES | Facility: HOSPITAL | Age: 47
LOS: 1 days | Discharge: HOME | End: 2017-10-05
Payer: MEDICAID

## 2017-10-05 ENCOUNTER — APPOINTMENT (OUTPATIENT)
Dept: HEMATOLOGY ONCOLOGY | Facility: CLINIC | Age: 47
End: 2017-10-05

## 2017-10-05 VITALS
OXYGEN SATURATION: 98 % | DIASTOLIC BLOOD PRESSURE: 70 MMHG | HEART RATE: 62 BPM | BODY MASS INDEX: 24.22 KG/M2 | TEMPERATURE: 98 F | SYSTOLIC BLOOD PRESSURE: 110 MMHG | WEIGHT: 136.69 LBS | RESPIRATION RATE: 16 BRPM | HEIGHT: 62.99 IN

## 2017-10-05 VITALS
RESPIRATION RATE: 15 BRPM | SYSTOLIC BLOOD PRESSURE: 131 MMHG | HEART RATE: 82 BPM | HEIGHT: 64 IN | TEMPERATURE: 98.2 F | BODY MASS INDEX: 23.66 KG/M2 | OXYGEN SATURATION: 96 % | WEIGHT: 138.56 LBS | DIASTOLIC BLOOD PRESSURE: 81 MMHG

## 2017-10-05 DIAGNOSIS — C34.90 MALIGNANT NEOPLASM OF UNSPECIFIED PART OF UNSPECIFIED BRONCHUS OR LUNG: ICD-10-CM

## 2017-10-05 DIAGNOSIS — Z78.9 OTHER SPECIFIED HEALTH STATUS: ICD-10-CM

## 2017-10-05 LAB
ALBUMIN SERPL ELPH-MCNC: 3.8 G/DL — SIGNIFICANT CHANGE UP (ref 3.3–5)
ALP SERPL-CCNC: 98 U/L — SIGNIFICANT CHANGE UP (ref 40–120)
ALT FLD-CCNC: 95 U/L — HIGH (ref 10–45)
ANION GAP SERPL CALC-SCNC: 17 MMOL/L — SIGNIFICANT CHANGE UP (ref 5–17)
APTT BLD: 30.7 SEC — SIGNIFICANT CHANGE UP (ref 27.5–37.4)
AST SERPL-CCNC: 32 U/L — SIGNIFICANT CHANGE UP (ref 10–40)
BILIRUB SERPL-MCNC: 0.2 MG/DL — SIGNIFICANT CHANGE UP (ref 0.2–1.2)
BUN SERPL-MCNC: 13 MG/DL — SIGNIFICANT CHANGE UP (ref 7–23)
CALCIUM SERPL-MCNC: 9.9 MG/DL — SIGNIFICANT CHANGE UP (ref 8.4–10.5)
CHLORIDE SERPL-SCNC: 93 MMOL/L — LOW (ref 96–108)
CO2 SERPL-SCNC: 25 MMOL/L — SIGNIFICANT CHANGE UP (ref 22–31)
CREAT SERPL-MCNC: 0.84 MG/DL — SIGNIFICANT CHANGE UP (ref 0.5–1.3)
GLUCOSE SERPL-MCNC: 85 MG/DL — SIGNIFICANT CHANGE UP (ref 70–99)
HAV IGM SER-ACNC: SIGNIFICANT CHANGE UP
HBV CORE IGM SER-ACNC: SIGNIFICANT CHANGE UP
HBV SURFACE AG SER-ACNC: SIGNIFICANT CHANGE UP
HCV AB S/CO SERPL IA: 0.08 S/CO — SIGNIFICANT CHANGE UP
HCV AB SERPL-IMP: SIGNIFICANT CHANGE UP
INR BLD: 1.13 RATIO — SIGNIFICANT CHANGE UP (ref 0.88–1.16)
POTASSIUM SERPL-MCNC: 4.8 MMOL/L — SIGNIFICANT CHANGE UP (ref 3.5–5.3)
POTASSIUM SERPL-SCNC: 4.8 MMOL/L — SIGNIFICANT CHANGE UP (ref 3.5–5.3)
PROT SERPL-MCNC: 6.8 G/DL — SIGNIFICANT CHANGE UP (ref 6–8.3)
PROTHROM AB SERPL-ACNC: 12.8 SEC — SIGNIFICANT CHANGE UP (ref 10–13.1)
SODIUM SERPL-SCNC: 135 MMOL/L — SIGNIFICANT CHANGE UP (ref 135–145)

## 2017-10-05 PROCEDURE — 99204 OFFICE O/P NEW MOD 45 MIN: CPT | Mod: GC

## 2017-10-06 LAB
BASOPHILS # BLD AUTO: 0 K/UL — SIGNIFICANT CHANGE UP (ref 0–0.2)
BASOPHILS NFR BLD AUTO: 0.3 % — SIGNIFICANT CHANGE UP (ref 0–2)
EOSINOPHIL # BLD AUTO: 0.3 K/UL — SIGNIFICANT CHANGE UP (ref 0–0.5)
EOSINOPHIL NFR BLD AUTO: 3.2 % — SIGNIFICANT CHANGE UP (ref 0–6)
LYMPHOCYTES # BLD AUTO: 1.2 K/UL — SIGNIFICANT CHANGE UP (ref 1–3.3)
LYMPHOCYTES # BLD AUTO: 11.8 % — LOW (ref 13–44)
MONOCYTES # BLD AUTO: 1.1 K/UL — HIGH (ref 0–0.9)
MONOCYTES NFR BLD AUTO: 10.9 % — SIGNIFICANT CHANGE UP (ref 2–14)
NEUTROPHILS # BLD AUTO: 7.6 K/UL — HIGH (ref 1.8–7.4)
NEUTROPHILS NFR BLD AUTO: 73.9 % — SIGNIFICANT CHANGE UP (ref 43–77)

## 2017-10-09 ENCOUNTER — APPOINTMENT (OUTPATIENT)
Dept: CT IMAGING | Facility: IMAGING CENTER | Age: 47
End: 2017-10-09
Payer: MEDICAID

## 2017-10-09 ENCOUNTER — OUTPATIENT (OUTPATIENT)
Dept: OUTPATIENT SERVICES | Facility: HOSPITAL | Age: 47
LOS: 1 days | End: 2017-10-09
Payer: MEDICAID

## 2017-10-09 DIAGNOSIS — Z87.09 PERSONAL HISTORY OF OTHER DISEASES OF THE RESPIRATORY SYSTEM: ICD-10-CM

## 2017-10-09 PROCEDURE — 74177 CT ABD & PELVIS W/CONTRAST: CPT | Mod: 26

## 2017-10-09 PROCEDURE — 74177 CT ABD & PELVIS W/CONTRAST: CPT

## 2017-10-11 ENCOUNTER — FORM ENCOUNTER (OUTPATIENT)
Age: 47
End: 2017-10-11

## 2017-10-12 ENCOUNTER — APPOINTMENT (OUTPATIENT)
Dept: RADIOLOGY | Facility: HOSPITAL | Age: 47
End: 2017-10-12

## 2017-10-12 ENCOUNTER — APPOINTMENT (OUTPATIENT)
Dept: THORACIC SURGERY | Facility: CLINIC | Age: 47
End: 2017-10-12
Payer: MEDICAID

## 2017-10-12 ENCOUNTER — OUTPATIENT (OUTPATIENT)
Dept: OUTPATIENT SERVICES | Facility: HOSPITAL | Age: 47
LOS: 1 days | End: 2017-10-12
Payer: MEDICAID

## 2017-10-12 VITALS
HEART RATE: 80 BPM | OXYGEN SATURATION: 97 % | BODY MASS INDEX: 23.56 KG/M2 | HEIGHT: 64 IN | DIASTOLIC BLOOD PRESSURE: 60 MMHG | RESPIRATION RATE: 16 BRPM | SYSTOLIC BLOOD PRESSURE: 120 MMHG | WEIGHT: 138 LBS

## 2017-10-12 DIAGNOSIS — R91.1 SOLITARY PULMONARY NODULE: ICD-10-CM

## 2017-10-12 DIAGNOSIS — Z87.891 PERSONAL HISTORY OF NICOTINE DEPENDENCE: ICD-10-CM

## 2017-10-12 PROCEDURE — 71020: CPT | Mod: 26

## 2017-10-12 PROCEDURE — 99215 OFFICE O/P EST HI 40 MIN: CPT

## 2017-10-13 ENCOUNTER — OUTPATIENT (OUTPATIENT)
Dept: OUTPATIENT SERVICES | Facility: HOSPITAL | Age: 47
LOS: 1 days | End: 2017-10-13

## 2017-10-13 DIAGNOSIS — Z87.09 PERSONAL HISTORY OF OTHER DISEASES OF THE RESPIRATORY SYSTEM: ICD-10-CM

## 2017-10-15 PROBLEM — Z87.891 FORMER SMOKER: Status: ACTIVE | Noted: 2017-10-05

## 2017-10-16 ENCOUNTER — FORM ENCOUNTER (OUTPATIENT)
Age: 47
End: 2017-10-16

## 2017-10-17 ENCOUNTER — APPOINTMENT (OUTPATIENT)
Dept: MRI IMAGING | Facility: IMAGING CENTER | Age: 47
End: 2017-10-17

## 2017-10-17 ENCOUNTER — APPOINTMENT (OUTPATIENT)
Dept: SPINE | Facility: AMBULATORY SURGERY CENTER | Age: 47
End: 2017-10-17
Payer: COMMERCIAL

## 2017-10-17 ENCOUNTER — OUTPATIENT (OUTPATIENT)
Dept: OUTPATIENT SERVICES | Facility: HOSPITAL | Age: 47
LOS: 1 days | End: 2017-10-17
Payer: MEDICAID

## 2017-10-17 ENCOUNTER — OUTPATIENT (OUTPATIENT)
Dept: OUTPATIENT SERVICES | Facility: HOSPITAL | Age: 47
LOS: 1 days | Discharge: HOME | End: 2017-10-17
Payer: MEDICAID

## 2017-10-17 DIAGNOSIS — C79.31 SECONDARY MALIGNANT NEOPLASM OF BRAIN: ICD-10-CM

## 2017-10-17 PROCEDURE — 77295 3-D RADIOTHERAPY PLAN: CPT | Mod: 26

## 2017-10-17 PROCEDURE — A9585: CPT

## 2017-10-17 PROCEDURE — 76498 UNLISTED MR PROCEDURE: CPT

## 2017-10-17 PROCEDURE — 61796 SRS CRANIAL LESION SIMPLE: CPT

## 2017-10-17 PROCEDURE — 77263 THER RADIOLOGY TX PLNG CPLX: CPT

## 2017-10-17 PROCEDURE — 61800 APPLY SRS HEADFRAME ADD-ON: CPT

## 2017-10-17 PROCEDURE — 61797 SRS CRAN LES SIMPLE ADDL: CPT

## 2017-10-17 PROCEDURE — 77334 RADIATION TREATMENT AID(S): CPT | Mod: 26

## 2017-10-17 PROCEDURE — 77432 STEREOTACTIC RADIATION TRMT: CPT

## 2017-10-17 PROCEDURE — 77300 RADIATION THERAPY DOSE PLAN: CPT | Mod: 26

## 2017-10-18 ENCOUNTER — FORM ENCOUNTER (OUTPATIENT)
Age: 47
End: 2017-10-18

## 2017-10-19 ENCOUNTER — RESULT REVIEW (OUTPATIENT)
Age: 47
End: 2017-10-19

## 2017-10-19 ENCOUNTER — APPOINTMENT (OUTPATIENT)
Dept: NUCLEAR MEDICINE | Facility: IMAGING CENTER | Age: 47
End: 2017-10-19
Payer: COMMERCIAL

## 2017-10-19 ENCOUNTER — OUTPATIENT (OUTPATIENT)
Dept: OUTPATIENT SERVICES | Facility: HOSPITAL | Age: 47
LOS: 1 days | End: 2017-10-19
Payer: MEDICAID

## 2017-10-19 ENCOUNTER — APPOINTMENT (OUTPATIENT)
Dept: HEMATOLOGY ONCOLOGY | Facility: CLINIC | Age: 47
End: 2017-10-19

## 2017-10-19 ENCOUNTER — APPOINTMENT (OUTPATIENT)
Dept: INFUSION THERAPY | Facility: HOSPITAL | Age: 47
End: 2017-10-19

## 2017-10-19 VITALS
HEART RATE: 91 BPM | WEIGHT: 137 LBS | SYSTOLIC BLOOD PRESSURE: 140 MMHG | BODY MASS INDEX: 23.52 KG/M2 | RESPIRATION RATE: 16 BRPM | DIASTOLIC BLOOD PRESSURE: 92 MMHG | TEMPERATURE: 98.2 F | OXYGEN SATURATION: 95 %

## 2017-10-19 DIAGNOSIS — Z87.09 PERSONAL HISTORY OF OTHER DISEASES OF THE RESPIRATORY SYSTEM: ICD-10-CM

## 2017-10-19 LAB
ALBUMIN SERPL ELPH-MCNC: 4 G/DL — SIGNIFICANT CHANGE UP (ref 3.3–5)
ALP SERPL-CCNC: 78 U/L — SIGNIFICANT CHANGE UP (ref 40–120)
ALT FLD-CCNC: 20 U/L — SIGNIFICANT CHANGE UP (ref 10–45)
ANION GAP SERPL CALC-SCNC: 16 MMOL/L — SIGNIFICANT CHANGE UP (ref 5–17)
AST SERPL-CCNC: 14 U/L — SIGNIFICANT CHANGE UP (ref 10–40)
BASOPHILS # BLD AUTO: 0 K/UL — SIGNIFICANT CHANGE UP (ref 0–0.2)
BASOPHILS NFR BLD AUTO: 0.4 % — SIGNIFICANT CHANGE UP (ref 0–2)
BILIRUB SERPL-MCNC: 0.2 MG/DL — SIGNIFICANT CHANGE UP (ref 0.2–1.2)
BUN SERPL-MCNC: 12 MG/DL — SIGNIFICANT CHANGE UP (ref 7–23)
CALCIUM SERPL-MCNC: 9.9 MG/DL — SIGNIFICANT CHANGE UP (ref 8.4–10.5)
CHLORIDE SERPL-SCNC: 88 MMOL/L — LOW (ref 96–108)
CO2 SERPL-SCNC: 25 MMOL/L — SIGNIFICANT CHANGE UP (ref 22–31)
CREAT SERPL-MCNC: 0.85 MG/DL — SIGNIFICANT CHANGE UP (ref 0.5–1.3)
EOSINOPHIL # BLD AUTO: 0.3 K/UL — SIGNIFICANT CHANGE UP (ref 0–0.5)
EOSINOPHIL NFR BLD AUTO: 2.9 % — SIGNIFICANT CHANGE UP (ref 0–6)
GLUCOSE SERPL-MCNC: 84 MG/DL — SIGNIFICANT CHANGE UP (ref 70–99)
LYMPHOCYTES # BLD AUTO: 0.9 K/UL — LOW (ref 1–3.3)
LYMPHOCYTES # BLD AUTO: 7.8 % — LOW (ref 13–44)
MONOCYTES # BLD AUTO: 1.2 K/UL — HIGH (ref 0–0.9)
MONOCYTES NFR BLD AUTO: 11 % — SIGNIFICANT CHANGE UP (ref 2–14)
NEUTROPHILS # BLD AUTO: 8.7 K/UL — HIGH (ref 1.8–7.4)
NEUTROPHILS NFR BLD AUTO: 78 % — HIGH (ref 43–77)
POTASSIUM SERPL-MCNC: 4.9 MMOL/L — SIGNIFICANT CHANGE UP (ref 3.5–5.3)
POTASSIUM SERPL-SCNC: 4.9 MMOL/L — SIGNIFICANT CHANGE UP (ref 3.5–5.3)
PROT SERPL-MCNC: 7 G/DL — SIGNIFICANT CHANGE UP (ref 6–8.3)
SODIUM SERPL-SCNC: 129 MMOL/L — LOW (ref 135–145)

## 2017-10-19 PROCEDURE — 78815 PET IMAGE W/CT SKULL-THIGH: CPT | Mod: 26,PI

## 2017-10-19 PROCEDURE — 78815 PET IMAGE W/CT SKULL-THIGH: CPT

## 2017-10-19 PROCEDURE — A9552: CPT

## 2017-10-19 RX ORDER — OXYCODONE HYDROCHLORIDE AND ACETAMINOPHEN 5; 325 MG/1; MG/1
5-325 TABLET ORAL
Qty: 120 | Refills: 0 | Status: DISCONTINUED | COMMUNITY
Start: 2017-10-05 | End: 2017-10-19

## 2017-10-19 RX ORDER — OXYCODONE AND ACETAMINOPHEN 5; 325 MG/1; MG/1
5-325 TABLET ORAL EVERY 6 HOURS
Qty: 120 | Refills: 0 | Status: DISCONTINUED | COMMUNITY
Start: 2017-10-07 | End: 2017-10-19

## 2017-10-19 RX ORDER — OXYCODONE HYDROCHLORIDE 15 MG/1
15 TABLET, FILM COATED, EXTENDED RELEASE ORAL
Qty: 60 | Refills: 0 | Status: DISCONTINUED | COMMUNITY
Start: 2017-10-05 | End: 2017-10-19

## 2017-10-19 RX ORDER — SENNOSIDES 8.6 MG TABLETS 8.6 MG/1
8.6 TABLET ORAL
Qty: 60 | Refills: 5 | Status: ACTIVE | COMMUNITY
Start: 2017-10-19 | End: 1900-01-01

## 2017-10-20 DIAGNOSIS — Z51.11 ENCOUNTER FOR ANTINEOPLASTIC CHEMOTHERAPY: ICD-10-CM

## 2017-10-20 LAB — HIV 1+2 AB+HIV1 P24 AG SERPL QL IA: SIGNIFICANT CHANGE UP

## 2017-10-22 PROBLEM — C34.90 MALIGNANT NEOPLASM OF UNSPECIFIED PART OF UNSPECIFIED BRONCHUS OR LUNG: Chronic | Status: ACTIVE | Noted: 2017-10-19

## 2017-10-23 ENCOUNTER — APPOINTMENT (OUTPATIENT)
Dept: HEMATOLOGY ONCOLOGY | Facility: CLINIC | Age: 47
End: 2017-10-23

## 2017-10-23 ENCOUNTER — RESULT REVIEW (OUTPATIENT)
Age: 47
End: 2017-10-23

## 2017-10-23 ENCOUNTER — APPOINTMENT (OUTPATIENT)
Dept: INFUSION THERAPY | Facility: HOSPITAL | Age: 47
End: 2017-10-23

## 2017-10-23 LAB
ALBUMIN SERPL ELPH-MCNC: 3.7 G/DL — SIGNIFICANT CHANGE UP (ref 3.3–5)
ALP SERPL-CCNC: 72 U/L — SIGNIFICANT CHANGE UP (ref 40–120)
ALT FLD-CCNC: 31 U/L — SIGNIFICANT CHANGE UP (ref 10–45)
ANION GAP SERPL CALC-SCNC: 17 MMOL/L — SIGNIFICANT CHANGE UP (ref 5–17)
AST SERPL-CCNC: 22 U/L — SIGNIFICANT CHANGE UP (ref 10–40)
BILIRUB SERPL-MCNC: <0.2 MG/DL — SIGNIFICANT CHANGE UP (ref 0.2–1.2)
BUN SERPL-MCNC: 9 MG/DL — SIGNIFICANT CHANGE UP (ref 7–23)
CALCIUM SERPL-MCNC: 9.2 MG/DL — SIGNIFICANT CHANGE UP (ref 8.4–10.5)
CEA SERPL-MCNC: 91.9 NG/ML — HIGH (ref 0–3.8)
CHLORIDE SERPL-SCNC: 93 MMOL/L — LOW (ref 96–108)
CO2 SERPL-SCNC: 24 MMOL/L — SIGNIFICANT CHANGE UP (ref 22–31)
CREAT SERPL-MCNC: 0.86 MG/DL — SIGNIFICANT CHANGE UP (ref 0.5–1.3)
GLUCOSE SERPL-MCNC: 111 MG/DL — HIGH (ref 70–99)
HCT VFR BLD CALC: 39.1 % — SIGNIFICANT CHANGE UP (ref 39–50)
HGB BLD-MCNC: 13.4 G/DL — SIGNIFICANT CHANGE UP (ref 13–17)
MCHC RBC-ENTMCNC: 30.2 PG — SIGNIFICANT CHANGE UP (ref 27–34)
MCHC RBC-ENTMCNC: 34.4 G/DL — SIGNIFICANT CHANGE UP (ref 32–36)
MCV RBC AUTO: 87.8 FL — SIGNIFICANT CHANGE UP (ref 80–100)
PLATELET # BLD AUTO: 287 K/UL — SIGNIFICANT CHANGE UP (ref 150–400)
POTASSIUM SERPL-MCNC: 4.7 MMOL/L — SIGNIFICANT CHANGE UP (ref 3.5–5.3)
POTASSIUM SERPL-SCNC: 4.7 MMOL/L — SIGNIFICANT CHANGE UP (ref 3.5–5.3)
PROT SERPL-MCNC: 6.4 G/DL — SIGNIFICANT CHANGE UP (ref 6–8.3)
RBC # BLD: 4.45 M/UL — SIGNIFICANT CHANGE UP (ref 4.2–5.8)
RBC # FLD: 11.6 % — SIGNIFICANT CHANGE UP (ref 10.3–14.5)
SODIUM SERPL-SCNC: 134 MMOL/L — LOW (ref 135–145)
TSH SERPL-MCNC: 0.95 UIU/ML — SIGNIFICANT CHANGE UP (ref 0.27–4.2)
WBC # BLD: 10.2 K/UL — SIGNIFICANT CHANGE UP (ref 3.8–10.5)
WBC # FLD AUTO: 10.2 K/UL — SIGNIFICANT CHANGE UP (ref 3.8–10.5)

## 2017-10-23 PROCEDURE — 93010 ELECTROCARDIOGRAM REPORT: CPT

## 2017-10-24 ENCOUNTER — OTHER (OUTPATIENT)
Age: 47
End: 2017-10-24

## 2017-10-24 DIAGNOSIS — R11.2 NAUSEA WITH VOMITING, UNSPECIFIED: ICD-10-CM

## 2017-10-25 RX ORDER — OXYCODONE 5 MG/1
5 TABLET ORAL
Qty: 180 | Refills: 0 | Status: DISCONTINUED | COMMUNITY
Start: 2017-10-19 | End: 2017-10-25

## 2017-10-31 ENCOUNTER — APPOINTMENT (OUTPATIENT)
Dept: SPINE | Facility: CLINIC | Age: 47
End: 2017-10-31
Payer: COMMERCIAL

## 2017-10-31 VITALS
TEMPERATURE: 98 F | HEIGHT: 64 IN | SYSTOLIC BLOOD PRESSURE: 122 MMHG | DIASTOLIC BLOOD PRESSURE: 70 MMHG | WEIGHT: 138 LBS | BODY MASS INDEX: 23.56 KG/M2

## 2017-10-31 PROCEDURE — 99024 POSTOP FOLLOW-UP VISIT: CPT

## 2017-10-31 RX ORDER — HYDROCODONE BITARTRATE AND HOMATROPINE METHYLBROMIDE 5; 1.5 MG/5ML; MG/5ML
5-1.5 SYRUP ORAL EVERY 6 HOURS
Qty: 300 | Refills: 0 | Status: DISCONTINUED | COMMUNITY
Start: 2017-10-17 | End: 2017-10-31

## 2017-10-31 RX ORDER — GABAPENTIN 100 MG/1
100 CAPSULE ORAL 3 TIMES DAILY
Qty: 90 | Refills: 0 | Status: DISCONTINUED | COMMUNITY
Start: 2017-10-05 | End: 2017-10-31

## 2017-10-31 RX ORDER — BENZONATATE 100 MG/1
100 CAPSULE ORAL
Qty: 30 | Refills: 0 | Status: DISCONTINUED | COMMUNITY
Start: 2017-08-28 | End: 2017-10-31

## 2017-11-01 ENCOUNTER — OUTPATIENT (OUTPATIENT)
Dept: OUTPATIENT SERVICES | Facility: HOSPITAL | Age: 47
LOS: 1 days | Discharge: HOME | End: 2017-11-01

## 2017-11-01 DIAGNOSIS — C34.90 MALIGNANT NEOPLASM OF UNSPECIFIED PART OF UNSPECIFIED BRONCHUS OR LUNG: ICD-10-CM

## 2017-11-02 ENCOUNTER — APPOINTMENT (OUTPATIENT)
Dept: HEMATOLOGY ONCOLOGY | Facility: CLINIC | Age: 47
End: 2017-11-02

## 2017-11-02 ENCOUNTER — APPOINTMENT (OUTPATIENT)
Dept: RADIOLOGY | Facility: HOSPITAL | Age: 47
End: 2017-11-02

## 2017-11-02 ENCOUNTER — OUTPATIENT (OUTPATIENT)
Dept: OUTPATIENT SERVICES | Facility: HOSPITAL | Age: 47
LOS: 1 days | End: 2017-11-02
Payer: MEDICAID

## 2017-11-02 ENCOUNTER — APPOINTMENT (OUTPATIENT)
Dept: THORACIC SURGERY | Facility: CLINIC | Age: 47
End: 2017-11-02
Payer: MEDICAID

## 2017-11-02 VITALS
SYSTOLIC BLOOD PRESSURE: 114 MMHG | HEART RATE: 77 BPM | HEIGHT: 64 IN | OXYGEN SATURATION: 98 % | DIASTOLIC BLOOD PRESSURE: 78 MMHG | BODY MASS INDEX: 23.56 KG/M2 | RESPIRATION RATE: 14 BRPM | WEIGHT: 138 LBS

## 2017-11-02 VITALS
SYSTOLIC BLOOD PRESSURE: 124 MMHG | BODY MASS INDEX: 23.46 KG/M2 | WEIGHT: 136.68 LBS | OXYGEN SATURATION: 100 % | DIASTOLIC BLOOD PRESSURE: 86 MMHG | HEART RATE: 76 BPM | TEMPERATURE: 98.2 F | RESPIRATION RATE: 16 BRPM

## 2017-11-02 DIAGNOSIS — C78.00 SECONDARY MALIGNANT NEOPLASM OF UNSPECIFIED LUNG: ICD-10-CM

## 2017-11-02 PROCEDURE — 99215 OFFICE O/P EST HI 40 MIN: CPT

## 2017-11-02 PROCEDURE — 71020: CPT | Mod: 26

## 2017-11-13 ENCOUNTER — RESULT REVIEW (OUTPATIENT)
Age: 47
End: 2017-11-13

## 2017-11-13 ENCOUNTER — APPOINTMENT (OUTPATIENT)
Dept: INFUSION THERAPY | Facility: HOSPITAL | Age: 47
End: 2017-11-13

## 2017-11-13 LAB
ALBUMIN SERPL ELPH-MCNC: 3.4 G/DL — SIGNIFICANT CHANGE UP (ref 3.3–5)
ALP SERPL-CCNC: 97 U/L — SIGNIFICANT CHANGE UP (ref 40–120)
ALT FLD-CCNC: 16 U/L — SIGNIFICANT CHANGE UP (ref 10–45)
ANION GAP SERPL CALC-SCNC: 17 MMOL/L — SIGNIFICANT CHANGE UP (ref 5–17)
AST SERPL-CCNC: 28 U/L — SIGNIFICANT CHANGE UP (ref 10–40)
BASOPHILS # BLD AUTO: 0 K/UL — SIGNIFICANT CHANGE UP (ref 0–0.2)
BASOPHILS NFR BLD AUTO: 0.7 % — SIGNIFICANT CHANGE UP (ref 0–2)
BILIRUB SERPL-MCNC: 0.3 MG/DL — SIGNIFICANT CHANGE UP (ref 0.2–1.2)
BUN SERPL-MCNC: 7 MG/DL — SIGNIFICANT CHANGE UP (ref 7–23)
CALCIUM SERPL-MCNC: 9.5 MG/DL — SIGNIFICANT CHANGE UP (ref 8.4–10.5)
CEA SERPL-MCNC: 117.7 NG/ML — HIGH (ref 0–3.8)
CHLORIDE SERPL-SCNC: 85 MMOL/L — LOW (ref 96–108)
CO2 SERPL-SCNC: 26 MMOL/L — SIGNIFICANT CHANGE UP (ref 22–31)
CREAT SERPL-MCNC: 0.79 MG/DL — SIGNIFICANT CHANGE UP (ref 0.5–1.3)
EOSINOPHIL # BLD AUTO: 0.4 K/UL — SIGNIFICANT CHANGE UP (ref 0–0.5)
EOSINOPHIL NFR BLD AUTO: 5.2 % — SIGNIFICANT CHANGE UP (ref 0–6)
GLUCOSE SERPL-MCNC: 110 MG/DL — HIGH (ref 70–99)
HCT VFR BLD CALC: 34.3 % — LOW (ref 39–50)
HGB BLD-MCNC: 12 G/DL — LOW (ref 13–17)
LYMPHOCYTES # BLD AUTO: 0.6 K/UL — LOW (ref 1–3.3)
LYMPHOCYTES # BLD AUTO: 8.5 % — LOW (ref 13–44)
MCHC RBC-ENTMCNC: 30.4 PG — SIGNIFICANT CHANGE UP (ref 27–34)
MCHC RBC-ENTMCNC: 34.9 G/DL — SIGNIFICANT CHANGE UP (ref 32–36)
MCV RBC AUTO: 87.3 FL — SIGNIFICANT CHANGE UP (ref 80–100)
MONOCYTES # BLD AUTO: 1.4 K/UL — HIGH (ref 0–0.9)
MONOCYTES NFR BLD AUTO: 19.7 % — HIGH (ref 2–14)
NEUTROPHILS # BLD AUTO: 4.7 K/UL — SIGNIFICANT CHANGE UP (ref 1.8–7.4)
NEUTROPHILS NFR BLD AUTO: 65.9 % — SIGNIFICANT CHANGE UP (ref 43–77)
PLAT MORPH BLD: NORMAL — SIGNIFICANT CHANGE UP
PLATELET # BLD AUTO: 416 K/UL — HIGH (ref 150–400)
POTASSIUM SERPL-MCNC: 5.3 MMOL/L — SIGNIFICANT CHANGE UP (ref 3.5–5.3)
POTASSIUM SERPL-SCNC: 5.3 MMOL/L — SIGNIFICANT CHANGE UP (ref 3.5–5.3)
PROT SERPL-MCNC: 6.9 G/DL — SIGNIFICANT CHANGE UP (ref 6–8.3)
RBC # BLD: 3.93 M/UL — LOW (ref 4.2–5.8)
RBC # FLD: 12.8 % — SIGNIFICANT CHANGE UP (ref 10.3–14.5)
RBC BLD AUTO: SIGNIFICANT CHANGE UP
SODIUM SERPL-SCNC: 128 MMOL/L — LOW (ref 135–145)
TSH SERPL-MCNC: 1.54 UIU/ML — SIGNIFICANT CHANGE UP (ref 0.27–4.2)
WBC # BLD: 7.2 K/UL — SIGNIFICANT CHANGE UP (ref 3.8–10.5)
WBC # FLD AUTO: 7.2 K/UL — SIGNIFICANT CHANGE UP (ref 3.8–10.5)

## 2017-11-14 ENCOUNTER — TRANSCRIPTION ENCOUNTER (OUTPATIENT)
Age: 47
End: 2017-11-14

## 2017-11-14 DIAGNOSIS — R11.2 NAUSEA WITH VOMITING, UNSPECIFIED: ICD-10-CM

## 2017-11-14 DIAGNOSIS — Z51.11 ENCOUNTER FOR ANTINEOPLASTIC CHEMOTHERAPY: ICD-10-CM

## 2017-11-15 RX ORDER — ONDANSETRON 4 MG/1
4 TABLET ORAL
Qty: 90 | Refills: 5 | Status: ACTIVE | COMMUNITY
Start: 2017-10-19 | End: 1900-01-01

## 2017-11-20 ENCOUNTER — APPOINTMENT (OUTPATIENT)
Dept: RADIATION ONCOLOGY | Facility: CLINIC | Age: 47
End: 2017-11-20
Payer: MEDICAID

## 2017-11-22 ENCOUNTER — APPOINTMENT (OUTPATIENT)
Dept: RADIATION ONCOLOGY | Facility: CLINIC | Age: 47
End: 2017-11-22
Payer: MEDICAID

## 2017-11-22 VITALS
TEMPERATURE: 98.1 F | BODY MASS INDEX: 22.38 KG/M2 | HEIGHT: 64 IN | WEIGHT: 131.06 LBS | OXYGEN SATURATION: 96 % | SYSTOLIC BLOOD PRESSURE: 126 MMHG | RESPIRATION RATE: 18 BRPM | HEART RATE: 93 BPM | DIASTOLIC BLOOD PRESSURE: 78 MMHG

## 2017-11-22 PROCEDURE — 99024 POSTOP FOLLOW-UP VISIT: CPT

## 2017-11-30 ENCOUNTER — APPOINTMENT (OUTPATIENT)
Dept: THORACIC SURGERY | Facility: HOSPITAL | Age: 47
End: 2017-11-30

## 2017-11-30 ENCOUNTER — APPOINTMENT (OUTPATIENT)
Dept: HEMATOLOGY ONCOLOGY | Facility: CLINIC | Age: 47
End: 2017-11-30

## 2017-11-30 ENCOUNTER — RESULT REVIEW (OUTPATIENT)
Age: 47
End: 2017-11-30

## 2017-11-30 ENCOUNTER — APPOINTMENT (OUTPATIENT)
Dept: THORACIC SURGERY | Facility: CLINIC | Age: 47
End: 2017-11-30
Payer: MEDICAID

## 2017-11-30 VITALS
HEART RATE: 92 BPM | DIASTOLIC BLOOD PRESSURE: 80 MMHG | WEIGHT: 129.41 LBS | OXYGEN SATURATION: 97 % | SYSTOLIC BLOOD PRESSURE: 124 MMHG | BODY MASS INDEX: 22.21 KG/M2 | RESPIRATION RATE: 16 BRPM | TEMPERATURE: 99 F

## 2017-11-30 VITALS
BODY MASS INDEX: 25.32 KG/M2 | OXYGEN SATURATION: 96 % | SYSTOLIC BLOOD PRESSURE: 130 MMHG | HEART RATE: 80 BPM | DIASTOLIC BLOOD PRESSURE: 80 MMHG | RESPIRATION RATE: 16 BRPM | WEIGHT: 129 LBS | HEIGHT: 60 IN

## 2017-11-30 DIAGNOSIS — Z00.00 ENCOUNTER FOR GENERAL ADULT MEDICAL EXAMINATION W/OUT ABNORMAL FINDINGS: ICD-10-CM

## 2017-11-30 LAB
BASOPHILS # BLD AUTO: 0 K/UL — SIGNIFICANT CHANGE UP (ref 0–0.2)
BASOPHILS NFR BLD AUTO: 0.4 % — SIGNIFICANT CHANGE UP (ref 0–2)
EOSINOPHIL # BLD AUTO: 0.3 K/UL — SIGNIFICANT CHANGE UP (ref 0–0.5)
EOSINOPHIL NFR BLD AUTO: 5.4 % — SIGNIFICANT CHANGE UP (ref 0–6)
HCT VFR BLD CALC: 32.4 % — LOW (ref 39–50)
HGB BLD-MCNC: 11.2 G/DL — LOW (ref 13–17)
LYMPHOCYTES # BLD AUTO: 0.6 K/UL — LOW (ref 1–3.3)
LYMPHOCYTES # BLD AUTO: 11.3 % — LOW (ref 13–44)
MCHC RBC-ENTMCNC: 30.2 PG — SIGNIFICANT CHANGE UP (ref 27–34)
MCHC RBC-ENTMCNC: 34.4 G/DL — SIGNIFICANT CHANGE UP (ref 32–36)
MCV RBC AUTO: 87.6 FL — SIGNIFICANT CHANGE UP (ref 80–100)
MONOCYTES # BLD AUTO: 1 K/UL — HIGH (ref 0–0.9)
MONOCYTES NFR BLD AUTO: 20.1 % — HIGH (ref 2–14)
NEUTROPHILS # BLD AUTO: 3.1 K/UL — SIGNIFICANT CHANGE UP (ref 1.8–7.4)
NEUTROPHILS NFR BLD AUTO: 62.7 % — SIGNIFICANT CHANGE UP (ref 43–77)
PLATELET # BLD AUTO: 244 K/UL — SIGNIFICANT CHANGE UP (ref 150–400)
RBC # BLD: 3.7 M/UL — LOW (ref 4.2–5.8)
RBC # FLD: 14.1 % — SIGNIFICANT CHANGE UP (ref 10.3–14.5)
WBC # BLD: 4.9 K/UL — SIGNIFICANT CHANGE UP (ref 3.8–10.5)
WBC # FLD AUTO: 4.9 K/UL — SIGNIFICANT CHANGE UP (ref 3.8–10.5)

## 2017-11-30 PROCEDURE — 99214 OFFICE O/P EST MOD 30 MIN: CPT

## 2017-11-30 RX ORDER — OXYCODONE AND ACETAMINOPHEN 7.5; 325 MG/1; MG/1
7.5-325 TABLET ORAL EVERY 8 HOURS
Qty: 90 | Refills: 0 | Status: DISCONTINUED | COMMUNITY
Start: 2017-11-22 | End: 2017-11-30

## 2017-11-30 RX ORDER — DOCUSATE SODIUM 100 MG/1
100 CAPSULE ORAL TWICE DAILY
Qty: 60 | Refills: 5 | Status: ACTIVE | COMMUNITY
Start: 2017-10-19 | End: 1900-01-01

## 2017-12-01 PROBLEM — Z00.00 ENCOUNTER FOR PREVENTIVE HEALTH EXAMINATION: Status: ACTIVE | Noted: 2017-09-29

## 2017-12-01 LAB
ALBUMIN SERPL ELPH-MCNC: 3.5 G/DL — SIGNIFICANT CHANGE UP (ref 3.3–5)
ALP SERPL-CCNC: 91 U/L — SIGNIFICANT CHANGE UP (ref 40–120)
ALT FLD-CCNC: 21 U/L — SIGNIFICANT CHANGE UP (ref 10–45)
ANION GAP SERPL CALC-SCNC: 18 MMOL/L — HIGH (ref 5–17)
AST SERPL-CCNC: 18 U/L — SIGNIFICANT CHANGE UP (ref 10–40)
BILIRUB SERPL-MCNC: 0.2 MG/DL — SIGNIFICANT CHANGE UP (ref 0.2–1.2)
BUN SERPL-MCNC: 10 MG/DL — SIGNIFICANT CHANGE UP (ref 7–23)
CALCIUM SERPL-MCNC: 9.1 MG/DL — SIGNIFICANT CHANGE UP (ref 8.4–10.5)
CHLORIDE SERPL-SCNC: 87 MMOL/L — LOW (ref 96–108)
CO2 SERPL-SCNC: 26 MMOL/L — SIGNIFICANT CHANGE UP (ref 22–31)
CREAT SERPL-MCNC: 0.67 MG/DL — SIGNIFICANT CHANGE UP (ref 0.5–1.3)
GLUCOSE SERPL-MCNC: 90 MG/DL — SIGNIFICANT CHANGE UP (ref 70–99)
POTASSIUM SERPL-MCNC: 4.6 MMOL/L — SIGNIFICANT CHANGE UP (ref 3.5–5.3)
POTASSIUM SERPL-SCNC: 4.6 MMOL/L — SIGNIFICANT CHANGE UP (ref 3.5–5.3)
PROT SERPL-MCNC: 7 G/DL — SIGNIFICANT CHANGE UP (ref 6–8.3)
SODIUM SERPL-SCNC: 131 MMOL/L — LOW (ref 135–145)

## 2017-12-04 ENCOUNTER — OUTPATIENT (OUTPATIENT)
Dept: OUTPATIENT SERVICES | Facility: HOSPITAL | Age: 47
LOS: 1 days | Discharge: HOME | End: 2017-12-04

## 2017-12-04 ENCOUNTER — APPOINTMENT (OUTPATIENT)
Dept: GERIATRICS | Facility: CLINIC | Age: 47
End: 2017-12-04
Payer: MEDICAID

## 2017-12-04 VITALS
TEMPERATURE: 99 F | SYSTOLIC BLOOD PRESSURE: 110 MMHG | HEART RATE: 92 BPM | RESPIRATION RATE: 16 BRPM | BODY MASS INDEX: 25.4 KG/M2 | OXYGEN SATURATION: 95 % | WEIGHT: 130.07 LBS | DIASTOLIC BLOOD PRESSURE: 70 MMHG

## 2017-12-04 DIAGNOSIS — C34.90 MALIGNANT NEOPLASM OF UNSPECIFIED PART OF UNSPECIFIED BRONCHUS OR LUNG: ICD-10-CM

## 2017-12-04 PROCEDURE — 99205 OFFICE O/P NEW HI 60 MIN: CPT

## 2017-12-04 RX ORDER — LIDOCAINE 4% 4 G/100G
4 CREAM TOPICAL
Qty: 2 | Refills: 1 | Status: ACTIVE | COMMUNITY
Start: 2017-12-04 | End: 1900-01-01

## 2017-12-07 ENCOUNTER — RESULT REVIEW (OUTPATIENT)
Age: 47
End: 2017-12-07

## 2017-12-07 ENCOUNTER — APPOINTMENT (OUTPATIENT)
Dept: INFUSION THERAPY | Facility: HOSPITAL | Age: 47
End: 2017-12-07

## 2017-12-07 LAB
ALBUMIN SERPL ELPH-MCNC: 3.4 G/DL — SIGNIFICANT CHANGE UP (ref 3.3–5)
ALP SERPL-CCNC: 90 U/L — SIGNIFICANT CHANGE UP (ref 40–120)
ALT FLD-CCNC: 17 U/L — SIGNIFICANT CHANGE UP (ref 10–45)
ANION GAP SERPL CALC-SCNC: 19 MMOL/L — HIGH (ref 5–17)
AST SERPL-CCNC: 15 U/L — SIGNIFICANT CHANGE UP (ref 10–40)
BILIRUB SERPL-MCNC: 0.2 MG/DL — SIGNIFICANT CHANGE UP (ref 0.2–1.2)
BUN SERPL-MCNC: 11 MG/DL — SIGNIFICANT CHANGE UP (ref 7–23)
CALCIUM SERPL-MCNC: 9.5 MG/DL — SIGNIFICANT CHANGE UP (ref 8.4–10.5)
CEA SERPL-MCNC: 180.3 NG/ML — HIGH (ref 0–3.8)
CHLORIDE SERPL-SCNC: 90 MMOL/L — LOW (ref 96–108)
CO2 SERPL-SCNC: 24 MMOL/L — SIGNIFICANT CHANGE UP (ref 22–31)
CREAT SERPL-MCNC: 0.72 MG/DL — SIGNIFICANT CHANGE UP (ref 0.5–1.3)
GLUCOSE SERPL-MCNC: 110 MG/DL — HIGH (ref 70–99)
HCT VFR BLD CALC: 32.9 % — LOW (ref 39–50)
HGB BLD-MCNC: 11.3 G/DL — LOW (ref 13–17)
MCHC RBC-ENTMCNC: 30.4 PG — SIGNIFICANT CHANGE UP (ref 27–34)
MCHC RBC-ENTMCNC: 34.3 G/DL — SIGNIFICANT CHANGE UP (ref 32–36)
MCV RBC AUTO: 88.6 FL — SIGNIFICANT CHANGE UP (ref 80–100)
PLATELET # BLD AUTO: 421 K/UL — HIGH (ref 150–400)
POTASSIUM SERPL-MCNC: 4.9 MMOL/L — SIGNIFICANT CHANGE UP (ref 3.5–5.3)
POTASSIUM SERPL-SCNC: 4.9 MMOL/L — SIGNIFICANT CHANGE UP (ref 3.5–5.3)
PROT SERPL-MCNC: 6.8 G/DL — SIGNIFICANT CHANGE UP (ref 6–8.3)
RBC # BLD: 3.72 M/UL — LOW (ref 4.2–5.8)
RBC # FLD: 14.6 % — HIGH (ref 10.3–14.5)
SODIUM SERPL-SCNC: 133 MMOL/L — LOW (ref 135–145)
TSH SERPL-MCNC: 1.72 UIU/ML — SIGNIFICANT CHANGE UP (ref 0.27–4.2)
WBC # BLD: 8.2 K/UL — SIGNIFICANT CHANGE UP (ref 3.8–10.5)
WBC # FLD AUTO: 8.2 K/UL — SIGNIFICANT CHANGE UP (ref 3.8–10.5)

## 2017-12-08 DIAGNOSIS — R11.2 NAUSEA WITH VOMITING, UNSPECIFIED: ICD-10-CM

## 2017-12-08 DIAGNOSIS — Z51.11 ENCOUNTER FOR ANTINEOPLASTIC CHEMOTHERAPY: ICD-10-CM

## 2017-12-11 ENCOUNTER — INPATIENT (INPATIENT)
Facility: HOSPITAL | Age: 47
LOS: 2 days | Discharge: HOME HEALTH | End: 2017-12-14
Attending: INTERNAL MEDICINE | Admitting: INTERNAL MEDICINE
Payer: MEDICAID

## 2017-12-11 VITALS
RESPIRATION RATE: 18 BRPM | TEMPERATURE: 98 F | DIASTOLIC BLOOD PRESSURE: 68 MMHG | OXYGEN SATURATION: 95 % | SYSTOLIC BLOOD PRESSURE: 113 MMHG | HEART RATE: 108 BPM

## 2017-12-11 DIAGNOSIS — R05 COUGH: ICD-10-CM

## 2017-12-11 DIAGNOSIS — C34.90 MALIGNANT NEOPLASM OF UNSPECIFIED PART OF UNSPECIFIED BRONCHUS OR LUNG: ICD-10-CM

## 2017-12-11 LAB
ALBUMIN SERPL ELPH-MCNC: 3.3 G/DL — SIGNIFICANT CHANGE UP (ref 3.3–5)
ALP SERPL-CCNC: 82 U/L — SIGNIFICANT CHANGE UP (ref 40–120)
ALT FLD-CCNC: 14 U/L — SIGNIFICANT CHANGE UP (ref 4–41)
APTT BLD: 28.2 SEC — SIGNIFICANT CHANGE UP (ref 27.5–37.4)
AST SERPL-CCNC: 19 U/L — SIGNIFICANT CHANGE UP (ref 4–40)
BASE EXCESS BLDV CALC-SCNC: 6.5 MMOL/L — SIGNIFICANT CHANGE UP
BASOPHILS # BLD AUTO: 0.06 K/UL — SIGNIFICANT CHANGE UP (ref 0–0.2)
BASOPHILS NFR BLD AUTO: 0.8 % — SIGNIFICANT CHANGE UP (ref 0–2)
BILIRUB SERPL-MCNC: 0.4 MG/DL — SIGNIFICANT CHANGE UP (ref 0.2–1.2)
BLOOD GAS VENOUS - CREATININE: 0.63 MG/DL — SIGNIFICANT CHANGE UP (ref 0.5–1.3)
BUN SERPL-MCNC: 10 MG/DL — SIGNIFICANT CHANGE UP (ref 7–23)
CALCIUM SERPL-MCNC: 8.5 MG/DL — SIGNIFICANT CHANGE UP (ref 8.4–10.5)
CHLORIDE BLDV-SCNC: 91 MMOL/L — LOW (ref 96–108)
CHLORIDE SERPL-SCNC: 90 MMOL/L — LOW (ref 98–107)
CO2 SERPL-SCNC: 29 MMOL/L — SIGNIFICANT CHANGE UP (ref 22–31)
CREAT SERPL-MCNC: 0.61 MG/DL — SIGNIFICANT CHANGE UP (ref 0.5–1.3)
EOSINOPHIL # BLD AUTO: 0.19 K/UL — SIGNIFICANT CHANGE UP (ref 0–0.5)
EOSINOPHIL NFR BLD AUTO: 2.7 % — SIGNIFICANT CHANGE UP (ref 0–6)
GAS PNL BLDV: 126 MMOL/L — LOW (ref 136–146)
GLUCOSE BLDV-MCNC: 93 — SIGNIFICANT CHANGE UP (ref 70–99)
GLUCOSE SERPL-MCNC: 89 MG/DL — SIGNIFICANT CHANGE UP (ref 70–99)
HCO3 BLDV-SCNC: 29 MMOL/L — HIGH (ref 20–27)
HCT VFR BLD CALC: 30.7 % — LOW (ref 39–50)
HCT VFR BLDV CALC: 32.3 % — LOW (ref 39–51)
HGB BLD-MCNC: 10.4 G/DL — LOW (ref 13–17)
HGB BLDV-MCNC: 10.4 G/DL — LOW (ref 13–17)
IMM GRANULOCYTES # BLD AUTO: 0.04 # — SIGNIFICANT CHANGE UP
IMM GRANULOCYTES NFR BLD AUTO: 0.6 % — SIGNIFICANT CHANGE UP (ref 0–1.5)
INR BLD: 1.36 — HIGH (ref 0.88–1.17)
LACTATE BLDV-MCNC: 1 MMOL/L — SIGNIFICANT CHANGE UP (ref 0.5–2)
LYMPHOCYTES # BLD AUTO: 0.56 K/UL — LOW (ref 1–3.3)
LYMPHOCYTES # BLD AUTO: 7.9 % — LOW (ref 13–44)
MCHC RBC-ENTMCNC: 29.9 PG — SIGNIFICANT CHANGE UP (ref 27–34)
MCHC RBC-ENTMCNC: 33.9 % — SIGNIFICANT CHANGE UP (ref 32–36)
MCV RBC AUTO: 88.2 FL — SIGNIFICANT CHANGE UP (ref 80–100)
MONOCYTES # BLD AUTO: 0.21 K/UL — SIGNIFICANT CHANGE UP (ref 0–0.9)
MONOCYTES NFR BLD AUTO: 2.9 % — SIGNIFICANT CHANGE UP (ref 2–14)
NEUTROPHILS # BLD AUTO: 6.07 K/UL — SIGNIFICANT CHANGE UP (ref 1.8–7.4)
NEUTROPHILS NFR BLD AUTO: 85.1 % — HIGH (ref 43–77)
NRBC # FLD: 0 — SIGNIFICANT CHANGE UP
PCO2 BLDV: 50 MMHG — SIGNIFICANT CHANGE UP (ref 41–51)
PH BLDV: 7.41 PH — SIGNIFICANT CHANGE UP (ref 7.32–7.43)
PLATELET # BLD AUTO: 289 K/UL — SIGNIFICANT CHANGE UP (ref 150–400)
PMV BLD: 9.4 FL — SIGNIFICANT CHANGE UP (ref 7–13)
PO2 BLDV: 38 MMHG — SIGNIFICANT CHANGE UP (ref 35–40)
POTASSIUM BLDV-SCNC: 3.8 MMOL/L — SIGNIFICANT CHANGE UP (ref 3.4–4.5)
POTASSIUM SERPL-MCNC: 4.3 MMOL/L — SIGNIFICANT CHANGE UP (ref 3.5–5.3)
POTASSIUM SERPL-SCNC: 4.3 MMOL/L — SIGNIFICANT CHANGE UP (ref 3.5–5.3)
PROT SERPL-MCNC: 6.5 G/DL — SIGNIFICANT CHANGE UP (ref 6–8.3)
PROTHROM AB SERPL-ACNC: 15.7 SEC — HIGH (ref 9.8–13.1)
RBC # BLD: 3.48 M/UL — LOW (ref 4.2–5.8)
RBC # FLD: 15 % — HIGH (ref 10.3–14.5)
SAO2 % BLDV: 66.8 % — SIGNIFICANT CHANGE UP (ref 60–85)
SODIUM SERPL-SCNC: 131 MMOL/L — LOW (ref 135–145)
WBC # BLD: 7.13 K/UL — SIGNIFICANT CHANGE UP (ref 3.8–10.5)
WBC # FLD AUTO: 7.13 K/UL — SIGNIFICANT CHANGE UP (ref 3.8–10.5)

## 2017-12-11 PROCEDURE — 71020: CPT | Mod: 26

## 2017-12-11 PROCEDURE — 71275 CT ANGIOGRAPHY CHEST: CPT | Mod: 26

## 2017-12-11 RX ORDER — HYDROMORPHONE HYDROCHLORIDE 2 MG/ML
0.5 INJECTION INTRAMUSCULAR; INTRAVENOUS; SUBCUTANEOUS ONCE
Qty: 0 | Refills: 0 | Status: DISCONTINUED | OUTPATIENT
Start: 2017-12-11 | End: 2017-12-11

## 2017-12-11 RX ORDER — ENOXAPARIN SODIUM 100 MG/ML
40 INJECTION SUBCUTANEOUS EVERY 24 HOURS
Qty: 0 | Refills: 0 | Status: DISCONTINUED | OUTPATIENT
Start: 2017-12-11 | End: 2017-12-14

## 2017-12-11 RX ORDER — OXYCODONE HYDROCHLORIDE 5 MG/1
40 TABLET ORAL ONCE
Qty: 0 | Refills: 0 | Status: DISCONTINUED | OUTPATIENT
Start: 2017-12-11 | End: 2017-12-11

## 2017-12-11 RX ORDER — HYDROMORPHONE HYDROCHLORIDE 2 MG/ML
1 INJECTION INTRAMUSCULAR; INTRAVENOUS; SUBCUTANEOUS ONCE
Qty: 0 | Refills: 0 | Status: DISCONTINUED | OUTPATIENT
Start: 2017-12-11 | End: 2017-12-11

## 2017-12-11 RX ORDER — IPRATROPIUM/ALBUTEROL SULFATE 18-103MCG
3 AEROSOL WITH ADAPTER (GRAM) INHALATION ONCE
Qty: 0 | Refills: 0 | Status: COMPLETED | OUTPATIENT
Start: 2017-12-11 | End: 2017-12-11

## 2017-12-11 RX ADMIN — HYDROMORPHONE HYDROCHLORIDE 1 MILLIGRAM(S): 2 INJECTION INTRAMUSCULAR; INTRAVENOUS; SUBCUTANEOUS at 17:05

## 2017-12-11 RX ADMIN — Medication 3 MILLILITER(S): at 11:55

## 2017-12-11 RX ADMIN — HYDROMORPHONE HYDROCHLORIDE 0.5 MILLIGRAM(S): 2 INJECTION INTRAMUSCULAR; INTRAVENOUS; SUBCUTANEOUS at 12:35

## 2017-12-11 RX ADMIN — HYDROMORPHONE HYDROCHLORIDE 1 MILLIGRAM(S): 2 INJECTION INTRAMUSCULAR; INTRAVENOUS; SUBCUTANEOUS at 23:05

## 2017-12-11 RX ADMIN — HYDROMORPHONE HYDROCHLORIDE 1 MILLIGRAM(S): 2 INJECTION INTRAMUSCULAR; INTRAVENOUS; SUBCUTANEOUS at 16:50

## 2017-12-11 RX ADMIN — HYDROMORPHONE HYDROCHLORIDE 0.5 MILLIGRAM(S): 2 INJECTION INTRAMUSCULAR; INTRAVENOUS; SUBCUTANEOUS at 12:50

## 2017-12-11 RX ADMIN — OXYCODONE HYDROCHLORIDE 40 MILLIGRAM(S): 5 TABLET ORAL at 15:40

## 2017-12-11 RX ADMIN — OXYCODONE HYDROCHLORIDE 40 MILLIGRAM(S): 5 TABLET ORAL at 16:50

## 2017-12-11 RX ADMIN — HYDROMORPHONE HYDROCHLORIDE 1 MILLIGRAM(S): 2 INJECTION INTRAMUSCULAR; INTRAVENOUS; SUBCUTANEOUS at 22:50

## 2017-12-11 NOTE — ED PROVIDER NOTE - ATTENDING CONTRIBUTION TO CARE
Seen and examined, mild distress, c/o R chest pain at rest, severe pain with coughing, inc. cough and SOB since Thurs., pain in area of prev. chest tube placement, denies fever/chills, no sick contacts, no hx of wheeze. Thin M, anicteric, MM dry, clear lungs, shallow resp, tender R CW, abd soft, flat, NT to palp, +b.s., no CVAT, no edema, NT calves.

## 2017-12-11 NOTE — H&P ADULT - HISTORY OF PRESENT ILLNESS
46 y/o M with a PMH of non-small cell carcinoma with mets to the bone and brain s/p VATs, radiation therapy  currently undergoing chemotherapy presents to the ED with cough and SOB. Pt reports 5 days ago developed exacerbation of cough worse at night/early morning. States he has coughing fits which last ~10 minutes and result in SOB. Cough productive of white/clear sputum. Dyspnea resolves with resolution of cough. Reports some mild dyspnea on exertion that is unchanged. Denies fevers, chills, orthopnea, chest pain, palpitations, lower extremity swelling. He saw his PMD and was given Hycodan with mild relief of coughing spells therefore promoting his visit. Reports no changes in appetite or activity level. Denies abdominal pain, n/v, diarrhea or sick contacts. Complains only of chronic back and shoulder pain for which he is on chronic opioids. Denies weakness, numbness, saddle aesthesia or bowel/bladder incontinence   Pt was diagnosed with NSC carcinoma in 9/17 after presenting with cough. He was found to have a left hilar mass which encased the LLL bronchus and pulmonary artery with large pleural effusion and adjacent atelectasis . He had left VATs and pleux cath which was removed appropriately a month ago. Also with evidence of mets to the T 10 body and a ring enhance lesion within the right precentral gyrus. He was treated with radiation therapy and is currently getting chemotherapy twice a week at the [facility] with Carboplatin, Alimta and keytruda.   In the Ed Vital Signs Last 24 Hrs  T(C): 37.4 (11 Dec 2017 20:19), Max: 37.4 (11 Dec 2017 20:19)  T(F): 99.3 (11 Dec 2017 20:19), Max: 99.3 (11 Dec 2017 20:19)  HR: 112 (11 Dec 2017 20:19) (98 - 119)  BP: 119/80 (11 Dec 2017 20:19) (113/68 - 146/81)  BP(mean): --  RR: 18 (11 Dec 2017 20:19) (15 - 19)  SpO2: 96% (11 Dec 2017 20:19) (95% - 97%)  Pt was treated with Dilaudid for pain control. CT of the chest negative for pulmonary edema and consistent with known malignancy as well as  small-moderate loculated left sided pleural effusion. 46 y/o M with a PMH of non-small cell carcinoma with mets to the bone and brain s/p VATs, radiation therapy  currently undergoing chemotherapy presents to the ED with cough and SOB. Pt reports 5 days ago developed exacerbation of cough worse at night/early morning. States he has coughing fits which last ~10 minutes and result in SOB. Cough productive of white/clear sputum. Dyspnea resolves with resolution of cough. Reports some mild dyspnea on exertion that is unchanged. Denies fevers, chills, orthopnea, chest pain, palpitations, lower extremity swelling. He saw his PMD and was given Hycodan with mild relief of coughing spells therefore promoting his visit. Reports no changes in appetite or activity level. Denies abdominal pain, n/v, diarrhea or sick contacts. Complains only of chronic back and shoulder pain for which he is on chronic opioids. Denies weakness, numbness, saddle aesthesia or bowel/bladder incontinence   Pt was diagnosed with NSC carcinoma in 9/17 after presenting with cough. He was found to have a left hilar mass which encased the LLL bronchus and pulmonary artery with large pleural effusion and adjacent atelectasis . He had left VATs and pleux cath which was removed appropriately a month ago. Also with evidence of mets to the T 10 body and a ring enhance lesion within the right precentral gyrus. He was treated with radiation therapy and is currently getting chemotherapy twice a week at the [facility] with Carboplatin, Alimta and keytruda.   In the Ed Vital Signs Last 24 Hrs  T(C): 37.4 (11 Dec 2017 20:19), Max: 37.4 (11 Dec 2017 20:19)  T(F): 99.3 (11 Dec 2017 20:19), Max: 99.3 (11 Dec 2017 20:19)  HR: 112 (11 Dec 2017 20:19) (98 - 119)  BP: 119/80 (11 Dec 2017 20:19) (113/68 - 146/81)  BP(mean): --  RR: 18 (11 Dec 2017 20:19) (15 - 19)  SpO2: 96% (11 Dec 2017 20:19) (95% - 97%)  Pt was treated with Dilaudid for pain control. CT of the chest negative for pulmonary embolism and consistent with known malignancy as well as  small-moderate loculated left sided pleural effusion.

## 2017-12-11 NOTE — ED PROVIDER NOTE - OBJECTIVE STATEMENT
46yo male with pmh of lung cancer on chemo and s/p left plueral vac presents with cough and sob. Pt for the past 3 days with severe cough, unable to sleep due to it also with sob. Pt is concerned he may have an effusion again. Pt report back pain similar to his usual, takes 30mg of oxycodone q8h. Denies bowel and bladder incontinence as well as saddle anesthesia. Pt denies cp/palp, abd pain/n/v/d, urinary symptoms, fevers/chills, recent travel.

## 2017-12-11 NOTE — H&P ADULT - ASSESSMENT
46 y/o M with a PMH of non-small cell carcinoma with mets to the bone and brain s/p VATs, radiation therapy  currently undergoing chemotherapy presents to the ED with cough and SOB. 48 y/o M with a PMH of non-small cell carcinoma with mets to the bone and brain s/p VATs, radiation therapy  currently undergoing chemotherapy presents to the ED with cough and shortness of breath.

## 2017-12-11 NOTE — H&P ADULT - NSHPPHYSICALEXAM_GEN_ALL_CORE
GENERAL APPEARANCE: cachectic,, alert and cooperative in moderate distress 2/2 to pain    HEENT:  PERRL, EOMI. External auditory canals normal, hearing grossly intact.  NECK: Neck supple, non-tender without lymphadenopathy, masses or thyromegaly.  CARDIAC: Normal S1 and S2. No S3, S4 or murmurs. Tachycardic on ascultation   LUNGS: Diminished breath sounds over left lung base, healed scar from left sided pleux cath placement  ABDOMEN: Positive bowel sounds. Soft, nondistended, nontender. No guarding or rebound.   MUSKULOSKELETAL: ROM intact spine and extremities. Pain on palpation of left shoulder joint  BACK: normal gait and posture, no spinal deformity, symmetry of spinal muscles. No tenderness to palpation over vertebrae, left paraspinal TTP  EXTREMITIES: No significant deformity or joint abnormality. No edema. Peripheral pulses intact. No varicosities.  NEUROLOGICAL: CN II-XII intact. Strength and sensation symmetric and intact throughout.   SKIN: Skin normal color, texture and turgor with no lesions or eruptions.  PSYCHIATRIC: AOx3.Normal affect and behavior.

## 2017-12-11 NOTE — ED PROVIDER NOTE - MEDICAL DECISION MAKING DETAILS
48yo male with pmh of lung cancer on chemo and s/p left plueral vac presents with cough and sob. Pt for the past 3 days with severe cough, unable to sleep due to it also with sob - r/o effusion and pna

## 2017-12-11 NOTE — H&P ADULT - PROBLEM SELECTOR PLAN 5
-likely 2/2 chronic disease vs bone marrow suppression  -continue to trend, monitor for signs of bleeding -Pt with baseline Na 131-133  -Euvolemic hyponatremia 2/2 to dehydration vs SIADH  -will check urine lytes  -continue to trend

## 2017-12-11 NOTE — H&P ADULT - FAMILY HISTORY
Sibling  Still living? Unknown  Family history of colon cancer, Age at diagnosis: Age Unknown     Grandparent  Still living? Unknown  Family history of lung cancer, Age at diagnosis: Age Unknown

## 2017-12-11 NOTE — H&P ADULT - PROBLEM SELECTOR PLAN 2
-s/p VATs and pleurx, now removed. With evidence of small- -c/b metastatic disease  -currently on chemotherapy as outpatient  -continue with OP pain meds, bowel regimen  -gentle IV hydration  -Heme/Onc consult in AM -c/b metastatic disease with back and shoulder pain, no evidence of cord compression  -currently on chemotherapy as outpatient  -continue with OP pain meds, plan to down titrate opioids by increasing gabapentin. Lidocaine for back pain. Continue with bowel regimen  -gentle IV hydration  -Heme/Onc consult in AM appreciate [employer] pain mgmt notes from Dr. Mendez  will place on oxycodone 40mg ER BID, gabapentin 100mg TID  oxycodone, dilaudid PRNs for breakthrough pain  pain exacerbated by coughing, c/w hycodan   palliative c/s

## 2017-12-11 NOTE — H&P ADULT - PROBLEM SELECTOR PLAN 3
-s/p VATs and pleurx. With evidence of small-moderate loculated likely malignant effusion   -without signs of associated atelectasis  -may benefit from thoracentesis to r/o infection if worsening, for now monitor for for HD instability -c/b metastatic disease with back and shoulder pain, no evidence of cord compression  -currently on chemotherapy as outpatient  -continue with OP pain meds, plan to down titrate opioids by increasing gabapentin. Lidocaine for back pain. Continue with bowel regimen  -gentle IV hydration  -Heme/Onc consult in AM

## 2017-12-11 NOTE — H&P ADULT - PROBLEM SELECTOR PLAN 6
-lovenox for DVT ppx -likely 2/2 chronic disease vs bone marrow suppression due to CT  -continue to trend, monitor for signs of bleeding

## 2017-12-11 NOTE — H&P ADULT - NSHPREVIEWOFSYSTEMS_GEN_ALL_CORE
CONSTITUTIONAL:  +12 lb weight loss over 2 months. +weakness No fever, chills  HEENT:  Eyes:  No visual loss, blurred vision, double vision or yellow sclerae. Ears, Nose, Throat:  No hearing loss, sneezing, congestion, runny nose or sore throat.  SKIN:  No rash or itching.  CARDIOVASCULAR:  +chest pain with coughing spells No chest pressure or chest discomfort. No palpitations or edema.  RESPIRATORY:  +cough and associated SOB, productive of clear sputum  GASTROINTESTINAL:  No anorexia, nausea, vomiting or diarrhea. No abdominal pain or blood.  GENITOURINARY:  Denies hematuria, dysuria.   NEUROLOGICAL:  No headache, dizziness, syncope, paralysis, ataxia, numbness or tingling in the extremities. No change in bowel or bladder control.  MUSCULOSKELETAL: +paraspinal back pain, shoulder pain.   HEMATOLOGIC:  No anemia, bleeding or bruising.  LYMPHATICS:  No enlarged nodes. No history of splenectomy.  PSYCHIATRIC:  No history of depression or anxiety.  ENDOCRINOLOGIC:  No reports of sweating, cold or heat intolerance. No polyuria or polydipsia.  ALLERGIES:  No history of asthma, hives, eczema or rhinitis.

## 2017-12-11 NOTE — H&P ADULT - NSHPLABSRESULTS_GEN_ALL_CORE
(12-11 @ 12:07)                      10.4  7.13 )-----------( 289                 30.7    Neutrophils = 6.07 (85.1%)  Lymphocytes = 0.56 (7.9%)  Eosinophils = 0.19 (2.7%)  Basophils = 0.06 (0.8%)  Monocytes = 0.21 (2.9%)  Bands = --%    12-11    131<L>  |  90<L>  |  10  ----------------------------<  89  4.3   |  29  |  0.61    Ca    8.5      11 Dec 2017 12:07    TPro  6.5  /  Alb  3.3  /  TBili  0.4  /  DBili  x   /  AST  19  /  ALT  14  /  AlkPhos  82  12-11    ( 11 Dec 2017 12:07 )   PT: 15.7 SEC;   INR: 1.36 ;       PTT:28.2 SEC      Venous Blood Gas:  12-11 @ 12:07  7.41/50/38/29/66.8  VBG Lactate: 1.0      Labs reviewed. No Leukocytosis, anemia~baseline, elevated INR. Hyponatremia as per baseline. Normal lactate    Imaging: < from: CT Angio Chest w/ IV Cont (12.11.17 @ 15:05) >    No pulmonary arterial emboli.    Small to moderate size multiloculated left pleural effusion with   associated pleural thickening and nodularity related to metastatic   disease. Central left lung mass encasing the bronchovascular bundles as   described above corresponding to the patient's given history of lung   cancer is difficult to accurately compare with the prior study of October 19, 2017.    Bilateral interlobular septal thickening with fissural nodularity and   thickening related to metastatic disease and lymphangitic spread of   neoplasm.    Right lung nodular patchy opacities with overall interval progression   since October 19, 2017. Differential diagnosis include progression of   neoplastic process or infection.    < end of copied text >      Left sided multiloculated pleural effusion slightly improved since last study. Right sided lower lobe nodular opacities new since 10/19 (12-11 @ 12:07)                      10.4  7.13 )-----------( 289                 30.7    Neutrophils = 6.07 (85.1%)  Lymphocytes = 0.56 (7.9%)  Eosinophils = 0.19 (2.7%)  Basophils = 0.06 (0.8%)  Monocytes = 0.21 (2.9%)  Bands = --%    12-11    131<L>  |  90<L>  |  10  ----------------------------<  89  4.3   |  29  |  0.61    Ca    8.5      11 Dec 2017 12:07    TPro  6.5  /  Alb  3.3  /  TBili  0.4  /  DBili  x   /  AST  19  /  ALT  14  /  AlkPhos  82  12-11    ( 11 Dec 2017 12:07 )   PT: 15.7 SEC;   INR: 1.36 ;   PTT:28.2 SEC    Venous Blood Gas:  12-11 @ 12:07  7.41/50/38/29/66.8  VBG Lactate: 1.0    Labs reviewed. No Leukocytosis, anemia~baseline, elevated INR. Hyponatremia as per baseline. Normal lactate    Imaging: < from: CT Angio Chest w/ IV Cont (12.11.17 @ 15:05) >  No pulmonary arterial emboli. Small to moderate size multiloculated left pleural effusion with associated pleural thickening and nodularity related to metastatic disease. Central left lung mass encasing the bronchovascular bundles as described above corresponding to the patient's given history of lung cancer is difficult to accurately compare with the prior study of October 19, 2017. Bilateral interlobular septal thickening with fissural nodularity and thickening related to metastatic disease and lymphangitic spread of neoplasm. Right lung nodular patchy opacities with overall interval progression since October 19, 2017. Differential diagnosis include progression of neoplastic process or infection.  < end of copied text >    Left sided multiloculated pleural effusion slightly improved since last study. Right sided lower lobe nodular opacities new since 10/19

## 2017-12-11 NOTE — H&P ADULT - PROBLEM SELECTOR PLAN 1
-exacerbation of cough at night, non productive without associated fevers, chills. Likely related to progression of disease vs URI  -without signs of respiratory distress on exam  0 -exacerbation of cough at night, non productive without associated fevers, chills. Likely related to progression of disease vs viral/bacterial infection  -without signs of respiratory distress on exam. Oxygen saturation 95-97% on RA. CT of the chest negative for pulmonary embolism. Evidence of right nodular opacities which may represent infectious process however without signs of ongoing infection  -continue with hycodan, tessalon perles  -duonebs as needed  -Incentive spirometer -exacerbation of cough at night, non productive without associated fevers, chills. Likely related to progression of disease vs viral/bacterial infection  -afebrile, no leukocytosis  -without signs of respiratory distress on exam. Oxygen saturation 95-97% on RA. CT of the chest negative for pulmonary embolism. Evidence of right nodular opacities which may represent infectious process however without signs of ongoing infection  -continue with hycodan, tessalon perles  -duonebs as needed  -Incentive spirometer

## 2017-12-11 NOTE — H&P ADULT - PROBLEM SELECTOR PLAN 4
-Pt with baseline Na 131-133  -Euvolemic hyponatremia 2/2 to dehydration vs SIADH  -will check urine lytes  -continue to trend -s/p VATs and pleurx. With evidence of small-moderate loculated likely malignant effusion   -without signs of associated atelectasis  -may benefit from thoracentesis to r/o infection if worsening, for now monitor for for HD instability

## 2017-12-11 NOTE — H&P ADULT - NSHPSOCIALHISTORY_GEN_ALL_CORE
Former smoker, smoked 4-5 ciggs for 20 years. Quit smoking and drinking in April 2017. Denies drug use. Currently unemployed. Lives with wife

## 2017-12-11 NOTE — ED ADULT NURSE NOTE - OBJECTIVE STATEMENT
Pt received to main ED room 8 with reports of cough, SOB, and increasing pain at Chest tube exit site (healed). Pt primarily Divehi speaking, denies need for  services, wife Emily at bedside for translation. Pt reporting active lung CA, last radiation and IV chemotherapy last Thursday per wife.  Pt received awake, alert, oriented x4, denies lightheadedness/dizziness at this time, does not endorse headache. Respirations appear shallow, unlabored, pt reporting cough with occasional white mucus production, decreased breath sounds auscultated on L side, rales auscultated on R side. Pt denies chest pain. Pt denies abdominal pain, reporting nausea, denies vomiting, diarrhea. Pt ambulatory at baseline, reporting generalized weakness, wife reports, 'he walks slow', denies recent fall, pt identified as falls risk. Skin warm, dry, pale for race. 20g PIV placed in R AC, labs drawn and sent per orders. VS documented per flow, safety maintained. Will continue to monitor.

## 2017-12-12 DIAGNOSIS — J90 PLEURAL EFFUSION, NOT ELSEWHERE CLASSIFIED: Chronic | ICD-10-CM

## 2017-12-12 DIAGNOSIS — Z51.5 ENCOUNTER FOR PALLIATIVE CARE: ICD-10-CM

## 2017-12-12 DIAGNOSIS — D64.9 ANEMIA, UNSPECIFIED: ICD-10-CM

## 2017-12-12 DIAGNOSIS — Z29.9 ENCOUNTER FOR PROPHYLACTIC MEASURES, UNSPECIFIED: ICD-10-CM

## 2017-12-12 DIAGNOSIS — G89.3 NEOPLASM RELATED PAIN (ACUTE) (CHRONIC): ICD-10-CM

## 2017-12-12 DIAGNOSIS — J90 PLEURAL EFFUSION, NOT ELSEWHERE CLASSIFIED: ICD-10-CM

## 2017-12-12 DIAGNOSIS — E87.1 HYPO-OSMOLALITY AND HYPONATREMIA: ICD-10-CM

## 2017-12-12 LAB
APTT BLD: 27.7 SEC — SIGNIFICANT CHANGE UP (ref 27.5–37.4)
BASOPHILS # BLD AUTO: 0.05 K/UL — SIGNIFICANT CHANGE UP (ref 0–0.2)
BASOPHILS NFR BLD AUTO: 1 % — SIGNIFICANT CHANGE UP (ref 0–2)
BUN SERPL-MCNC: 10 MG/DL — SIGNIFICANT CHANGE UP (ref 7–23)
BUN SERPL-MCNC: 12 MG/DL — SIGNIFICANT CHANGE UP (ref 7–23)
CALCIUM SERPL-MCNC: 8.4 MG/DL — SIGNIFICANT CHANGE UP (ref 8.4–10.5)
CALCIUM SERPL-MCNC: 8.8 MG/DL — SIGNIFICANT CHANGE UP (ref 8.4–10.5)
CHLORIDE SERPL-SCNC: 88 MMOL/L — LOW (ref 98–107)
CHLORIDE SERPL-SCNC: 91 MMOL/L — LOW (ref 98–107)
CHLORIDE UR-SCNC: 113 MMOL/L — SIGNIFICANT CHANGE UP
CO2 SERPL-SCNC: 26 MMOL/L — SIGNIFICANT CHANGE UP (ref 22–31)
CO2 SERPL-SCNC: 26 MMOL/L — SIGNIFICANT CHANGE UP (ref 22–31)
CREAT ?TM UR-MCNC: 54.33 MG/DL — SIGNIFICANT CHANGE UP
CREAT SERPL-MCNC: 0.56 MG/DL — SIGNIFICANT CHANGE UP (ref 0.5–1.3)
CREAT SERPL-MCNC: 0.78 MG/DL — SIGNIFICANT CHANGE UP (ref 0.5–1.3)
EOSINOPHIL # BLD AUTO: 0.16 K/UL — SIGNIFICANT CHANGE UP (ref 0–0.5)
EOSINOPHIL NFR BLD AUTO: 3.1 % — SIGNIFICANT CHANGE UP (ref 0–6)
GLUCOSE SERPL-MCNC: 101 MG/DL — HIGH (ref 70–99)
GLUCOSE SERPL-MCNC: 108 MG/DL — HIGH (ref 70–99)
HCT VFR BLD CALC: 30 % — LOW (ref 39–50)
HGB BLD-MCNC: 10.3 G/DL — LOW (ref 13–17)
IMM GRANULOCYTES # BLD AUTO: 0.07 # — SIGNIFICANT CHANGE UP
IMM GRANULOCYTES NFR BLD AUTO: 1.4 % — SIGNIFICANT CHANGE UP (ref 0–1.5)
INR BLD: 1.32 — HIGH (ref 0.88–1.17)
LYMPHOCYTES # BLD AUTO: 0.43 K/UL — LOW (ref 1–3.3)
LYMPHOCYTES # BLD AUTO: 8.4 % — LOW (ref 13–44)
MAGNESIUM SERPL-MCNC: 1.9 MG/DL — SIGNIFICANT CHANGE UP (ref 1.6–2.6)
MCHC RBC-ENTMCNC: 29.9 PG — SIGNIFICANT CHANGE UP (ref 27–34)
MCHC RBC-ENTMCNC: 34.3 % — SIGNIFICANT CHANGE UP (ref 32–36)
MCV RBC AUTO: 87 FL — SIGNIFICANT CHANGE UP (ref 80–100)
MONOCYTES # BLD AUTO: 0.24 K/UL — SIGNIFICANT CHANGE UP (ref 0–0.9)
MONOCYTES NFR BLD AUTO: 4.7 % — SIGNIFICANT CHANGE UP (ref 2–14)
NEUTROPHILS # BLD AUTO: 4.16 K/UL — SIGNIFICANT CHANGE UP (ref 1.8–7.4)
NEUTROPHILS NFR BLD AUTO: 81.4 % — HIGH (ref 43–77)
NRBC # FLD: 0 — SIGNIFICANT CHANGE UP
OSMOLALITY SERPL: 259 MOSMO/KG — LOW (ref 275–295)
OSMOLALITY UR: 467 MOSMO/KG — SIGNIFICANT CHANGE UP (ref 50–1200)
PHOSPHATE SERPL-MCNC: 3.5 MG/DL — SIGNIFICANT CHANGE UP (ref 2.5–4.5)
PLATELET # BLD AUTO: 284 K/UL — SIGNIFICANT CHANGE UP (ref 150–400)
PMV BLD: 9.9 FL — SIGNIFICANT CHANGE UP (ref 7–13)
POTASSIUM SERPL-MCNC: 4.2 MMOL/L — SIGNIFICANT CHANGE UP (ref 3.5–5.3)
POTASSIUM SERPL-MCNC: 4.4 MMOL/L — SIGNIFICANT CHANGE UP (ref 3.5–5.3)
POTASSIUM SERPL-SCNC: 4.2 MMOL/L — SIGNIFICANT CHANGE UP (ref 3.5–5.3)
POTASSIUM SERPL-SCNC: 4.4 MMOL/L — SIGNIFICANT CHANGE UP (ref 3.5–5.3)
POTASSIUM UR-SCNC: 37.7 MEQ/L — SIGNIFICANT CHANGE UP
PROTHROM AB SERPL-ACNC: 15.3 SEC — HIGH (ref 9.8–13.1)
RBC # BLD: 3.45 M/UL — LOW (ref 4.2–5.8)
RBC # FLD: 14.9 % — HIGH (ref 10.3–14.5)
SODIUM SERPL-SCNC: 128 MMOL/L — LOW (ref 135–145)
SODIUM SERPL-SCNC: 130 MMOL/L — LOW (ref 135–145)
SODIUM UR-SCNC: 121 MEQ/L — SIGNIFICANT CHANGE UP
WBC # BLD: 5.11 K/UL — SIGNIFICANT CHANGE UP (ref 3.8–10.5)
WBC # FLD AUTO: 5.11 K/UL — SIGNIFICANT CHANGE UP (ref 3.8–10.5)

## 2017-12-12 PROCEDURE — 99223 1ST HOSP IP/OBS HIGH 75: CPT | Mod: GC

## 2017-12-12 PROCEDURE — 99223 1ST HOSP IP/OBS HIGH 75: CPT | Mod: 57

## 2017-12-12 PROCEDURE — 99232 SBSQ HOSP IP/OBS MODERATE 35: CPT

## 2017-12-12 PROCEDURE — 99233 SBSQ HOSP IP/OBS HIGH 50: CPT

## 2017-12-12 RX ORDER — DOCUSATE SODIUM 100 MG
100 CAPSULE ORAL THREE TIMES A DAY
Qty: 0 | Refills: 0 | Status: DISCONTINUED | OUTPATIENT
Start: 2017-12-12 | End: 2017-12-14

## 2017-12-12 RX ORDER — OXYCODONE AND ACETAMINOPHEN 5; 325 MG/1; MG/1
1 TABLET ORAL EVERY 4 HOURS
Qty: 0 | Refills: 0 | Status: DISCONTINUED | OUTPATIENT
Start: 2017-12-12 | End: 2017-12-12

## 2017-12-12 RX ORDER — OXYCODONE HYDROCHLORIDE 5 MG/1
5 TABLET ORAL
Qty: 0 | Refills: 0 | Status: DISCONTINUED | OUTPATIENT
Start: 2017-12-12 | End: 2017-12-14

## 2017-12-12 RX ORDER — LIDOCAINE 4 G/100G
1 CREAM TOPICAL DAILY
Qty: 0 | Refills: 0 | Status: DISCONTINUED | OUTPATIENT
Start: 2017-12-12 | End: 2017-12-14

## 2017-12-12 RX ORDER — ACETAMINOPHEN 500 MG
650 TABLET ORAL EVERY 6 HOURS
Qty: 0 | Refills: 0 | Status: DISCONTINUED | OUTPATIENT
Start: 2017-12-12 | End: 2017-12-14

## 2017-12-12 RX ORDER — SODIUM CHLORIDE 9 MG/ML
1000 INJECTION INTRAMUSCULAR; INTRAVENOUS; SUBCUTANEOUS
Qty: 0 | Refills: 0 | Status: DISCONTINUED | OUTPATIENT
Start: 2017-12-12 | End: 2017-12-12

## 2017-12-12 RX ORDER — OXYCODONE HYDROCHLORIDE 5 MG/1
40 TABLET ORAL EVERY 12 HOURS
Qty: 0 | Refills: 0 | Status: DISCONTINUED | OUTPATIENT
Start: 2017-12-12 | End: 2017-12-14

## 2017-12-12 RX ORDER — ONDANSETRON 8 MG/1
4 TABLET, FILM COATED ORAL EVERY 6 HOURS
Qty: 0 | Refills: 0 | Status: DISCONTINUED | OUTPATIENT
Start: 2017-12-12 | End: 2017-12-14

## 2017-12-12 RX ORDER — SENNA PLUS 8.6 MG/1
2 TABLET ORAL AT BEDTIME
Qty: 0 | Refills: 0 | Status: DISCONTINUED | OUTPATIENT
Start: 2017-12-12 | End: 2017-12-14

## 2017-12-12 RX ORDER — GABAPENTIN 400 MG/1
200 CAPSULE ORAL EVERY 8 HOURS
Qty: 0 | Refills: 0 | Status: DISCONTINUED | OUTPATIENT
Start: 2017-12-12 | End: 2017-12-14

## 2017-12-12 RX ORDER — HYDROMORPHONE HYDROCHLORIDE 2 MG/ML
0.5 INJECTION INTRAMUSCULAR; INTRAVENOUS; SUBCUTANEOUS EVERY 4 HOURS
Qty: 0 | Refills: 0 | Status: DISCONTINUED | OUTPATIENT
Start: 2017-12-12 | End: 2017-12-12

## 2017-12-12 RX ORDER — ONDANSETRON 8 MG/1
4 TABLET, FILM COATED ORAL EVERY 6 HOURS
Qty: 0 | Refills: 0 | Status: DISCONTINUED | OUTPATIENT
Start: 2017-12-12 | End: 2017-12-12

## 2017-12-12 RX ORDER — GABAPENTIN 400 MG/1
100 CAPSULE ORAL EVERY 8 HOURS
Qty: 0 | Refills: 0 | Status: DISCONTINUED | OUTPATIENT
Start: 2017-12-12 | End: 2017-12-12

## 2017-12-12 RX ADMIN — LIDOCAINE 1 PATCH: 4 CREAM TOPICAL at 12:30

## 2017-12-12 RX ADMIN — Medication 100 MILLIGRAM(S): at 14:42

## 2017-12-12 RX ADMIN — Medication 100 MILLIGRAM(S): at 05:31

## 2017-12-12 RX ADMIN — Medication 100 MILLIGRAM(S): at 22:46

## 2017-12-12 RX ADMIN — HYDROMORPHONE HYDROCHLORIDE 0.5 MILLIGRAM(S): 2 INJECTION INTRAMUSCULAR; INTRAVENOUS; SUBCUTANEOUS at 10:57

## 2017-12-12 RX ADMIN — HYDROMORPHONE HYDROCHLORIDE 0.5 MILLIGRAM(S): 2 INJECTION INTRAMUSCULAR; INTRAVENOUS; SUBCUTANEOUS at 14:48

## 2017-12-12 RX ADMIN — HYDROMORPHONE HYDROCHLORIDE 0.5 MILLIGRAM(S): 2 INJECTION INTRAMUSCULAR; INTRAVENOUS; SUBCUTANEOUS at 15:03

## 2017-12-12 RX ADMIN — ENOXAPARIN SODIUM 40 MILLIGRAM(S): 100 INJECTION SUBCUTANEOUS at 05:30

## 2017-12-12 RX ADMIN — HYDROMORPHONE HYDROCHLORIDE 0.5 MILLIGRAM(S): 2 INJECTION INTRAMUSCULAR; INTRAVENOUS; SUBCUTANEOUS at 04:08

## 2017-12-12 RX ADMIN — HYDROMORPHONE HYDROCHLORIDE 0.5 MILLIGRAM(S): 2 INJECTION INTRAMUSCULAR; INTRAVENOUS; SUBCUTANEOUS at 10:42

## 2017-12-12 RX ADMIN — OXYCODONE HYDROCHLORIDE 40 MILLIGRAM(S): 5 TABLET ORAL at 18:41

## 2017-12-12 RX ADMIN — HYDROMORPHONE HYDROCHLORIDE 0.5 MILLIGRAM(S): 2 INJECTION INTRAMUSCULAR; INTRAVENOUS; SUBCUTANEOUS at 03:53

## 2017-12-12 RX ADMIN — GABAPENTIN 100 MILLIGRAM(S): 400 CAPSULE ORAL at 05:31

## 2017-12-12 RX ADMIN — OXYCODONE HYDROCHLORIDE 40 MILLIGRAM(S): 5 TABLET ORAL at 05:31

## 2017-12-12 RX ADMIN — GABAPENTIN 200 MILLIGRAM(S): 400 CAPSULE ORAL at 22:46

## 2017-12-12 RX ADMIN — OXYCODONE HYDROCHLORIDE 5 MILLIGRAM(S): 5 TABLET ORAL at 18:39

## 2017-12-12 RX ADMIN — OXYCODONE HYDROCHLORIDE 5 MILLIGRAM(S): 5 TABLET ORAL at 19:36

## 2017-12-12 RX ADMIN — GABAPENTIN 100 MILLIGRAM(S): 400 CAPSULE ORAL at 14:42

## 2017-12-12 RX ADMIN — OXYCODONE HYDROCHLORIDE 40 MILLIGRAM(S): 5 TABLET ORAL at 06:31

## 2017-12-12 RX ADMIN — ONDANSETRON 4 MILLIGRAM(S): 8 TABLET, FILM COATED ORAL at 12:15

## 2017-12-12 RX ADMIN — ONDANSETRON 4 MILLIGRAM(S): 8 TABLET, FILM COATED ORAL at 05:31

## 2017-12-12 NOTE — CONSULT NOTE ADULT - ASSESSMENT
47 year old male with metastatic non small cell lung Ca s/p left sided Pleurex, removed 1 month ago, presents with cough and found to have left sided loculated effusion   -Effusion is decreased in size from prior study  -Patient currently asymptomatic from effusion  -Cough and shortness of breath likely secondary to underlying malignancy  -Patient has no signs of infection, normal white count and afebrile   -Would NOT intervene on effusion as currently asymptomatic   -No thoracic surgery intervention indicated at this time, continue care as per primary team

## 2017-12-12 NOTE — PROGRESS NOTE ADULT - PROBLEM SELECTOR PLAN 5
-Pt with baseline Na 131-133, Na dropped today  - Repeat BMP pending, will decide on further IVF pending repeat BMP given Na dropped after IVF today  -Euvolemic hyponatremia 2/2 to dehydration vs SIADH  - urine lytes noted  -continue to trend

## 2017-12-12 NOTE — PROGRESS NOTE ADULT - PROBLEM SELECTOR PLAN 3
-c/b metastatic disease with back and shoulder pain, no evidence of cord compression  -currently on chemotherapy as outpatient  -continue with OP pain meds, plan to down titrate opioids by increasing gabapentin. Lidocaine for back pain. Continue with bowel regimen  -gentle IV hydration  - F/u onc consult  -Heme/Onc consult in AM -c/b metastatic disease with back and shoulder pain, no evidence of cord compression  -currently on chemotherapy as outpatient  -continue with OP pain meds, plan to down titrate opioids by increasing gabapentin. Lidocaine for back pain. Continue with bowel regimen  -gentle IV hydration  - F/u onc consult

## 2017-12-12 NOTE — CONSULT NOTE ADULT - SUBJECTIVE AND OBJECTIVE BOX
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 Pt reports 5 days ago developed exacerbation of cough worse at night/early morning. States he has coughing fits which last ~10 minutes and result in SOB. Cough productive of white/clear sputum. Dyspnea resolves with resolution of cough. Reports some mild dyspnea on exertion that is unchanged. Denies fevers, chills, orthopnea, chest pain, palpitations, lower extremity swelling. He saw his PMD and was given Hycodan with mild relief of coughing spells therefore promoting his visit. Reports no changes in appetite or activity level. Denies abdominal pain, n/v, diarrhea or sick contacts. Complains only of chronic back and shoulder pain for which he is on chronic opioids. Denies weakness, numbness, saddle aesthesia or bowel/bladder incontinence   Pt was diagnosed with NSC carcinoma in 9/17 after presenting with cough. He was found to have a left hilar mass which encased the LLL bronchus and pulmonary artery with large pleural effusion and adjacent atelectasis . He had left VATs and pleux cath which was removed appropriately a month ago. Also with evidence of mets to the T 10 body and a ring enhance lesion within the right precentral gyrus. He was treated with radiation therapy and is currently getting chemotherapy twice a week at the [facility] with Carboplatin, Alimta and keytruda.     CT of the chest negative for pulmonary embolism and consistent with known malignancy as well as  small-moderate loculated left sided pleural effusion. (11 Dec 2017 22:47)      PERTINENT PMH REVIEWED:  [x ] YES [ ] NO           SOCIAL HISTORY:  Significant other/partner:  [ x] YES  [ ] NO            Children:  [x ] YES- 15 year old in [place]              Congregation/Spirituality:  Sabianist   Substance hx:  [ ] YES   [ x] NO           Tobacco hx:  [x ] YES  [ ] NO -former            Alcohol hx: [ ] YES  [x ] NO        Home Opioid hx:  [x ] YES - oxycontin 40MG po BID, oxycodone PRN   Living Situation: [x ] Home  [ ] Long term care  [ ] Rehab    FAMILY HISTORY:  Family history of lung cancer (Grandparent)  Family history of colon cancer (Sibling)    BASELINE ADLs (prior to admission):  Independent [ ] moderately [ x] fully   Dependent   [ ] moderately [ ] fully    Code Status:  Full code    [x ] Surrogate  [ ] HCP  [ ] Guardian:  Emily Clay                                                                  Phone#: [phone]    Allergies    ampicillin (Rash)    Intolerances        MEDICATIONS  (STANDING):  docusate sodium 100 milliGRAM(s) Oral three times a day  enoxaparin Injectable 40 milliGRAM(s) SubCutaneous every 24 hours  gabapentin 100 milliGRAM(s) Oral every 8 hours  lidocaine   Patch 1 Patch Transdermal daily  ondansetron Injectable 4 milliGRAM(s) IV Push every 6 hours  oxyCODONE  ER Tablet 40 milliGRAM(s) Oral every 12 hours  senna 2 Tablet(s) Oral at bedtime    MEDICATIONS  (PRN):  acetaminophen   Tablet 650 milliGRAM(s) Oral every 6 hours PRN Mild Pain (1 - 3)  benzonatate 100 milliGRAM(s) Oral three times a day PRN Cough  HYDROcodone/homatropine Syrup 5 milliLiter(s) Oral every 4 hours PRN Cough  HYDROmorphone  Injectable 0.5 milliGRAM(s) IV Push every 4 hours PRN Severe Breakthrough pain (7-10)  oxyCODONE    5 mG/acetaminophen 325 mG 1 Tablet(s) Oral every 4 hours PRN Moderate Breakthrough pain (4 - 6)      PRESENT SYMPTOMS:  Source: [ ] Patient   [ ] Family   [ ] Team     Pain: [ ] YES [ ] NO  Onset:                    Location:                          Duration:                 Character:            Aggravating factors:                        Relieving factors:    Radiation:              Timing:                             Severity:      Dyspnea: [ ] YES [ ] NO - Mild [ ]  Moderate [ ]  Severe [ ]    Anxiety: [ ] YES [ ] NO  Fatigue: [ ] YES [ ] NO   Nausea: [ ] YES [ ] NO  Loss of appetite: [ ] YES [ ] NO   Constipation: [ ] YES [ ] NO     Other Symptoms:  [ ] All other review of systems negative   [ ] Unable to obtain due to poor mentation     Does patient meet criteria for Severe Protein Calorie Malnutrition?  Yes [ ]  No [ ]  PPSV 30% or below [ ]  Anasarca [ ]  Albumin < 2 [ ] Catabolic State [ ] Poor nutritional intake [ ] Significant weight loss [ ]      Palliative Performance Status Version 2:         %  ECOG -        Vital Signs Last 24 Hrs  T(C): 36.8 (12 Dec 2017 05:38), Max: 37.4 (11 Dec 2017 20:19)  T(F): 98.2 (12 Dec 2017 05:38), Max: 99.3 (11 Dec 2017 20:19)  HR: 120 (12 Dec 2017 05:38) (112 - 120)  BP: 131/88 (12 Dec 2017 05:38) (119/80 - 146/81)  BP(mean): --  RR: 18 (12 Dec 2017 05:38) (15 - 18)  SpO2: 93% (12 Dec 2017 05:38) (93% - 97%)    Physical Exam:    General: [ ] Alert,  A&O x     [ ] lethargic   [ ] Agitated   [ ] Cachexia   HEENT: [ ] Normal   [ ] Dry mouth   [ ] ET Tube    [ ] Trach   Lungs: [ ] Clear [ ] Rhonchi  [ ] Crackles [ ] Wheezing [ ] Tachypnea  [ ] Audible excessive secretions    Cardiovascular:  [ ] Regular rate and rhythm  [ ] Irregular [ ] Tachycardia   [ ] Bradycardia   Abdomen: [ ] Soft  [ ] Distended  [ ] +BS  [ ] Non tender [ ] Tender  [ ]PEG   [ ] NGT   Last BM: 12/10/17  Genitourinary: [ ] Normal [ ] Incontinent   [ ] Oliguria/Anuria   [ ] Cabral  Musculoskeletal:  [ ] Normal   [ ] Generalized weakness  [ ] Bedbound  [ ] Edema   Neurological: [ ] No focal deficits  [ ] Cognitive impairment     Skin: [x ] Normal   [ ] Pressure ulcers     LABS:                        10.3   5.11  )-----------( 284      ( 12 Dec 2017 05:40 )             30.0     12-12    128<L>  |  88<L>  |  10  ----------------------------<  101<H>  4.2   |  26  |  0.56    Ca    8.8      12 Dec 2017 05:40  Phos  3.5     12-12  Mg     1.9     12-12    TPro  6.5  /  Alb  3.3  /  TBili  0.4  /  DBili  x   /  AST  19  /  ALT  14  /  AlkPhos  82  12-11    PT/INR - ( 12 Dec 2017 05:40 )   PT: 15.3 SEC;   INR: 1.32          PTT - ( 12 Dec 2017 05:40 )  PTT:27.7 SEC    I&O's Summary      RADIOLOGY & ADDITIONAL STUDIES:  12/11/17 - CT chest  IMPRESSION: No pulmonary arterial emboli.    Small to moderate size multiloculated left pleural effusion with   associated pleural thickening and nodularity related to metastatic   disease. Central left lung mass encasing the bronchovascular bundles as   described above corresponding to the patient's given history of lung   cancer is difficult to accurately compare with the prior study of October 19, 2017.    Bilateral interlobular septal thickening with fissural nodularity and   thickening related to metastatic disease and lymphangitic spread of   neoplasm.    Right lung nodular patchy opacities with overall interval progression   since October 19, 2017. Differential diagnosis include progression of   neoplastic process or infection.      Referrals:  Hospice [ ]   Chaplaincy [ ]    Child Life [ ]   Social Work [ ]   Case Management [ ]   Holistic Therapy [ ] HPI: Obtained from H&P  48 y/o M with a PMH of non-small cell carcinoma with mets to the bone and brain s/p VATs, radiation therapy  currently undergoing chemotherapy presents to the ED with cough and SOB. Pt reports 5 days ago developed exacerbation of cough worse at night/early morning. States he has coughing fits which last ~10 minutes and result in SOB. Cough productive of white/clear sputum. Dyspnea resolves with resolution of cough. Reports some mild dyspnea on exertion that is unchanged. Denies fevers, chills, orthopnea, chest pain, palpitations, lower extremity swelling. He saw his PMD and was given Hycodan with mild relief of coughing spells therefore promoting his visit. Reports no changes in appetite or activity level. Denies abdominal pain, n/v, diarrhea or sick contacts. Complains only of chronic back and shoulder pain for which he is on chronic opioids. Denies weakness, numbness, saddle aesthesia or bowel/bladder incontinence   Pt was diagnosed with NSC carcinoma in 9/17 after presenting with cough. He was found to have a left hilar mass which encased the LLL bronchus and pulmonary artery with large pleural effusion and adjacent atelectasis . He had left VATs and pleux cath which was removed appropriately a month ago. Also with evidence of mets to the T 10 body and a ring enhance lesion within the right precentral gyrus. He was treated with radiation therapy and is currently getting chemotherapy twice a week at the [facility] with Carboplatin, Alimta and keytruda.     CT of the chest negative for pulmonary embolism and consistent with known malignancy as well as  small-moderate loculated left sided pleural effusion. (11 Dec 2017 22:47)      PERTINENT PMH REVIEWED:  [x ] YES [ ] NO           SOCIAL HISTORY:  Significant other/partner:  [ x] YES  [ ] NO            Children:  [x ] YES- 15 year old in [place]              Mandaen/Spirituality:  Bahai   Substance hx:  [ ] YES   [ x] NO           Tobacco hx:  [x ] YES  [ ] NO -former            Alcohol hx: [ ] YES  [x ] NO        Home Opioid hx:  [x ] YES - oxycontin 40MG po BID, oxycodone PRN   Living Situation: [x ] Home  [ ] Long term care  [ ] Rehab    FAMILY HISTORY:  Family history of lung cancer (Grandparent)  Family history of colon cancer (Sibling)    BASELINE ADLs (prior to admission):  Independent [ ] moderately [ x] fully   Dependent   [ ] moderately [ ] fully    Code Status:  Full code    [x ] Surrogate  [ ] HCP  [ ] Guardian:  Emily Clay                                                                  Phone#: [phone]    Allergies    ampicillin (Rash)    Intolerances        MEDICATIONS  (STANDING):  docusate sodium 100 milliGRAM(s) Oral three times a day  enoxaparin Injectable 40 milliGRAM(s) SubCutaneous every 24 hours  gabapentin 100 milliGRAM(s) Oral every 8 hours  lidocaine   Patch 1 Patch Transdermal daily  ondansetron Injectable 4 milliGRAM(s) IV Push every 6 hours  oxyCODONE  ER Tablet 40 milliGRAM(s) Oral every 12 hours  senna 2 Tablet(s) Oral at bedtime    MEDICATIONS  (PRN):  acetaminophen   Tablet 650 milliGRAM(s) Oral every 6 hours PRN Mild Pain (1 - 3)  benzonatate 100 milliGRAM(s) Oral three times a day PRN Cough  HYDROcodone/homatropine Syrup 5 milliLiter(s) Oral every 4 hours PRN Cough  HYDROmorphone  Injectable 0.5 milliGRAM(s) IV Push every 4 hours PRN Severe Breakthrough pain (7-10)  oxyCODONE    5 mG/acetaminophen 325 mG 1 Tablet(s) Oral every 4 hours PRN Moderate Breakthrough pain (4 - 6)      PRESENT SYMPTOMS:  Source: [ ] Patient   [ ] Family   [ ] Team     Pain: [ ] YES [ ] NO none at present.    Onset:                    Location:                          Duration:                 Character:            Aggravating factors:                        Relieving factors:    Radiation:              Timing:                             Severity:      Dyspnea: [ ] YES [ ] NO - Mild [ ]  Moderate [ ]  Severe [ ]    Anxiety: [ ] YES [ ] NO  Fatigue: [ ] YES [ ] NO   Nausea: [ ] YES [ ] NO  Loss of appetite: [ ] YES [ ] NO   Constipation: [ ] YES [ ] NO     Other Symptoms:  [ ] All other review of systems negative   [ ] Unable to obtain due to poor mentation     Does patient meet criteria for Severe Protein Calorie Malnutrition?  Yes [ ]  No [ ]  PPSV 30% or below [ ]  Anasarca [ ]  Albumin < 2 [ ] Catabolic State [ ] Poor nutritional intake [ ] Significant weight loss [ ]      Palliative Performance Status Version 2:         %  ECOG -        Vital Signs Last 24 Hrs  T(C): 36.8 (12 Dec 2017 05:38), Max: 37.4 (11 Dec 2017 20:19)  T(F): 98.2 (12 Dec 2017 05:38), Max: 99.3 (11 Dec 2017 20:19)  HR: 120 (12 Dec 2017 05:38) (112 - 120)  BP: 131/88 (12 Dec 2017 05:38) (119/80 - 146/81)  BP(mean): --  RR: 18 (12 Dec 2017 05:38) (15 - 18)  SpO2: 93% (12 Dec 2017 05:38) (93% - 97%)    Physical Exam:    General: [ ] Alert,  A&O x     [ ] lethargic   [ ] Agitated   [ ] Cachexia   HEENT: [ ] Normal   [ ] Dry mouth   [ ] ET Tube    [ ] Trach   Lungs: [ ] Clear [ ] Rhonchi  [ ] Crackles [ ] Wheezing [ ] Tachypnea  [ ] Audible excessive secretions    Cardiovascular:  [ ] Regular rate and rhythm  [ ] Irregular [ ] Tachycardia   [ ] Bradycardia   Abdomen: [ ] Soft  [ ] Distended  [ ] +BS  [ ] Non tender [ ] Tender  [ ]PEG   [ ] NGT   Last BM: 12/10/17  Genitourinary: [ ] Normal [ ] Incontinent   [ ] Oliguria/Anuria   [ ] Cabral  Musculoskeletal:  [ ] Normal   [ ] Generalized weakness  [ ] Bedbound  [ ] Edema   Neurological: [ ] No focal deficits  [ ] Cognitive impairment     Skin: [x ] Normal   [ ] Pressure ulcers     LABS:                        10.3   5.11  )-----------( 284      ( 12 Dec 2017 05:40 )             30.0     12-12    128<L>  |  88<L>  |  10  ----------------------------<  101<H>  4.2   |  26  |  0.56    Ca    8.8      12 Dec 2017 05:40  Phos  3.5     12-12  Mg     1.9     12-12    TPro  6.5  /  Alb  3.3  /  TBili  0.4  /  DBili  x   /  AST  19  /  ALT  14  /  AlkPhos  82  12-11    PT/INR - ( 12 Dec 2017 05:40 )   PT: 15.3 SEC;   INR: 1.32          PTT - ( 12 Dec 2017 05:40 )  PTT:27.7 SEC    I&O's Summary      RADIOLOGY & ADDITIONAL STUDIES:  12/11/17 - CT chest  IMPRESSION: No pulmonary arterial emboli.    Small to moderate size multiloculated left pleural effusion with   associated pleural thickening and nodularity related to metastatic   disease. Central left lung mass encasing the bronchovascular bundles as   described above corresponding to the patient's given history of lung   cancer is difficult to accurately compare with the prior study of October 19, 2017.    Bilateral interlobular septal thickening with fissural nodularity and   thickening related to metastatic disease and lymphangitic spread of   neoplasm.    Right lung nodular patchy opacities with overall interval progression   since October 19, 2017. Differential diagnosis include progression of   neoplastic process or infection.      Referrals:  Hospice [ ]   Chaplaincy [ ]    Child Life [ ]   Social Work [ ]   Case Management [ ]   Holistic Therapy [ ] HPI: Obtained from H&P  46 y/o M with a PMH of non-small cell carcinoma with mets to the bone and brain s/p VATs, radiation therapy  currently undergoing chemotherapy presents to the ED with cough and SOB. Pt reports 5 days ago developed exacerbation of cough worse at night/early morning. States he has coughing fits which last ~10 minutes and result in SOB. Cough productive of white/clear sputum. Dyspnea resolves with resolution of cough. Reports some mild dyspnea on exertion that is unchanged. Denies fevers, chills, orthopnea, chest pain, palpitations, lower extremity swelling. He saw his PMD and was given Hycodan with mild relief of coughing spells therefore promoting his visit. Reports no changes in appetite or activity level. Denies abdominal pain, n/v, diarrhea or sick contacts. Complains only of chronic back and shoulder pain for which he is on chronic opioids. Denies weakness, numbness, saddle aesthesia or bowel/bladder incontinence   Pt was diagnosed with NSC carcinoma in 9/17 after presenting with cough. He was found to have a left hilar mass which encased the LLL bronchus and pulmonary artery with large pleural effusion and adjacent atelectasis . He had left VATs and pleux cath which was removed appropriately a month ago. Also with evidence of mets to the T 10 body and a ring enhance lesion within the right precentral gyrus. He was treated with radiation therapy and is currently getting chemotherapy twice a week at the [facility] with Carboplatin, Alimta and keytruda.     CT of the chest negative for pulmonary embolism and consistent with known malignancy as well as  small-moderate loculated left sided pleural effusion. (11 Dec 2017 22:47)     phone #[phone] Bengali utilized     Patient currently laying in bed in no acute distress      PERTINENT PMH REVIEWED:  [x ] YES [ ] NO           SOCIAL HISTORY:  Significant other/partner:  [ x] YES  [ ] NO            Children:  [x ] YES- 15 year old in [place]              Jain/Spirituality:  Rastafari   Substance hx:  [ ] YES   [ x] NO           Tobacco hx:  [x ] YES  [ ] NO -former            Alcohol hx: [ ] YES  [x ] NO        Home Opioid hx:  [x ] YES - oxycontin 40MG po BID, oxycodone PRN   Living Situation: [x ] Home with his wife  [ ] Long term care  [ ] Rehab    FAMILY HISTORY:  Family history of lung cancer (Grandparent)  Family history of colon cancer (Sibling)    BASELINE ADLs (prior to admission):  Independent [ ] moderately [ x] fully   Dependent   [ ] moderately [ ] fully    Code Status:  Full code    [x ] Surrogate  [ ] HCP  [ ] Guardian:  Emily Clay                                                                  Phone#: [phone]    Allergies    ampicillin (Rash)    Intolerances        MEDICATIONS  (STANDING):  docusate sodium 100 milliGRAM(s) Oral three times a day  enoxaparin Injectable 40 milliGRAM(s) SubCutaneous every 24 hours  gabapentin 100 milliGRAM(s) Oral every 8 hours  lidocaine   Patch 1 Patch Transdermal daily  ondansetron Injectable 4 milliGRAM(s) IV Push every 6 hours  oxyCODONE  ER Tablet 40 milliGRAM(s) Oral every 12 hours  senna 2 Tablet(s) Oral at bedtime    MEDICATIONS  (PRN):  acetaminophen   Tablet 650 milliGRAM(s) Oral every 6 hours PRN Mild Pain (1 - 3)  benzonatate 100 milliGRAM(s) Oral three times a day PRN Cough  HYDROcodone/homatropine Syrup 5 milliLiter(s) Oral every 4 hours PRN Cough  HYDROmorphone  Injectable 0.5 milliGRAM(s) IV Push every 4 hours PRN Severe Breakthrough pain (7-10)  oxyCODONE    5 mG/acetaminophen 325 mG 1 Tablet(s) Oral every 4 hours PRN Moderate Breakthrough pain (4 - 6)      PRESENT SYMPTOMS:  Source: [ ] Patient   [ ] Family   [ ] Team     Pain: [x ] YES - Patient states that his pain is "stable" to his left shoulder and his old pleurx catheter site (Left chest).  Unable to quantify pain or state what type of pain it is.  He reports that it started after the removal of his pleurx catheter about 1 month ago.  Aggravated by movement/coughing and relieved by opioids, no radiation.    Dyspnea: Denies currently     Anxiety: [ ] YES [x ] NO  Fatigue: [ ] YES [x ] NO   Nausea: [ ] YES [x ] NO  Loss of appetite: [ ] YES [x ] NO   Constipation: [ ] YES [x ] NO     Other Symptoms: +cough - he reports a worsening in his cough over the past 5 days (unable to state when it started) - reports improvement since admission on the regimen he is on currently   [ ] All other review of systems negative   [ ] Unable to obtain due to poor mentation     Does patient meet criteria for Severe Protein Calorie Malnutrition?  Yes [ ]  No [x ]  PPSV 30% or below [ ]  Anasarca [ ]  Albumin < 2 [ ] Catabolic State [ ] Poor nutritional intake [ ] Significant weight loss [ ]      Palliative Performance Status Version 2:   70-80%        Vital Signs Last 24 Hrs  T(C): 36.8 (12 Dec 2017 05:38), Max: 37.4 (11 Dec 2017 20:19)  T(F): 98.2 (12 Dec 2017 05:38), Max: 99.3 (11 Dec 2017 20:19)  HR: 120 (12 Dec 2017 05:38) (112 - 120)  BP: 131/88 (12 Dec 2017 05:38) (119/80 - 146/81)  BP(mean): --  RR: 18 (12 Dec 2017 05:38) (15 - 18)  SpO2: 93% (12 Dec 2017 05:38) (93% - 97%)    Physical Exam:    General: [x ] Alert,  A&O x  4   [ ] lethargic   [ ] Agitated   [ ] Cachexia   HEENT: [x ] Normal   [ ] Dry mouth   [ ] ET Tube    [ ] Trach   Lungs: Decreased breath sounds to left mid/lower lung    Cardiovascular:  Regular rhythm, tachycardic    Abdomen: [x ] Soft  [ ] Distended  [x ] +BS  [x ] Non tender [ ] Tender  [ ]PEG   [ ] NGT   Last BM: 12/11/17  Genitourinary: [x ] Normal [ ] Incontinent   [ ] Oliguria/Anuria   [ ] Cabral  Musculoskeletal:  [ ] Normal   [x ] Generalized weakness  [ ] Bedbound  [ ] Edema   Neurological: [x ] No focal deficits  [ ] Cognitive impairment     Skin: [x ] Normal   [ ] Pressure ulcers     LABS:                        10.3   5.11  )-----------( 284      ( 12 Dec 2017 05:40 )             30.0     12-12    128<L>  |  88<L>  |  10  ----------------------------<  101<H>  4.2   |  26  |  0.56    Ca    8.8      12 Dec 2017 05:40  Phos  3.5     12-12  Mg     1.9     12-12    TPro  6.5  /  Alb  3.3  /  TBili  0.4  /  DBili  x   /  AST  19  /  ALT  14  /  AlkPhos  82  12-11    PT/INR - ( 12 Dec 2017 05:40 )   PT: 15.3 SEC;   INR: 1.32          PTT - ( 12 Dec 2017 05:40 )  PTT:27.7 SEC    I&O's Summary      RADIOLOGY & ADDITIONAL STUDIES:  12/11/17 - CT chest  IMPRESSION: No pulmonary arterial emboli.    Small to moderate size multiloculated left pleural effusion with   associated pleural thickening and nodularity related to metastatic   disease. Central left lung mass encasing the bronchovascular bundles as   described above corresponding to the patient's given history of lung   cancer is difficult to accurately compare with the prior study of October 19, 2017.    Bilateral interlobular septal thickening with fissural nodularity and   thickening related to metastatic disease and lymphangitic spread of   neoplasm.    Right lung nodular patchy opacities with overall interval progression   since October 19, 2017. Differential diagnosis include progression of   neoplastic process or infection.      Referrals:  Hospice [ ]   Chaplaincy [ ]    Child Life [ ]   Social Work [ ]   Case Management [ ]   Holistic Therapy [ ] HPI: Obtained from H&P  48 y/o M with a PMH of non-small cell carcinoma with mets to the bone and brain s/p VATs, radiation therapy  currently undergoing chemotherapy presents to the ED with cough and SOB. Pt reports 5 days ago developed exacerbation of cough worse at night/early morning. States he has coughing fits which last ~10 minutes and result in SOB. Cough productive of white/clear sputum. Dyspnea resolves with resolution of cough. Reports some mild dyspnea on exertion that is unchanged. Denies fevers, chills, orthopnea, chest pain, palpitations, lower extremity swelling. He saw his PMD and was given Hycodan with mild relief of coughing spells therefore promoting his visit. Reports no changes in appetite or activity level. Denies abdominal pain, n/v, diarrhea or sick contacts. Complains only of chronic back and shoulder pain for which he is on chronic opioids. Denies weakness, numbness, saddle aesthesia or bowel/bladder incontinence   Pt was diagnosed with NSC carcinoma in 9/17 after presenting with cough. He was found to have a left hilar mass which encased the LLL bronchus and pulmonary artery with large pleural effusion and adjacent atelectasis . He had left VATs and pleux cath which was removed appropriately a month ago. Also with evidence of mets to the T 10 body and a ring enhance lesion within the right precentral gyrus. He was treated with radiation therapy and is currently getting chemotherapy twice a week at the [facility] with Carboplatin, Alimta and keytruda.     CT of the chest negative for pulmonary embolism and consistent with known malignancy as well as  small-moderate loculated left sided pleural effusion. (11 Dec 2017 22:47)     phone #[phone] Italian utilized     Patient currently laying in bed in no acute distress      PERTINENT PMH REVIEWED:  [x ] YES [ ] NO           SOCIAL HISTORY:  Significant other/partner:  [ x] YES  [ ] NO            Children:  [x ] YES- 15 year old in [place]              Episcopalian/Spirituality:  Baptism   Substance hx:  [ ] YES   [ x] NO           Tobacco hx:  [x ] YES  [ ] NO -former            Alcohol hx: [ ] YES  [x ] NO        Home Opioid hx:  [x ] YES - oxycontin 40MG po BID, oxycodone PRN   Living Situation: [x ] Home with his wife  [ ] Long term care  [ ] Rehab    FAMILY HISTORY:  Family history of lung cancer (Grandparent)  Family history of colon cancer (Sibling)    BASELINE ADLs (prior to admission):  Independent [ ] moderately [ x] fully   Dependent   [ ] moderately [ ] fully    Code Status:  Full code    [x ] Surrogate  [ ] HCP  [ ] Guardian:  Emily Clay                                                                  Phone#: [phone]    Allergies    ampicillin (Rash)    Intolerances        MEDICATIONS  (STANDING):  docusate sodium 100 milliGRAM(s) Oral three times a day  enoxaparin Injectable 40 milliGRAM(s) SubCutaneous every 24 hours  gabapentin 100 milliGRAM(s) Oral every 8 hours  lidocaine   Patch 1 Patch Transdermal daily  ondansetron Injectable 4 milliGRAM(s) IV Push every 6 hours  oxyCODONE  ER Tablet 40 milliGRAM(s) Oral every 12 hours  senna 2 Tablet(s) Oral at bedtime    MEDICATIONS  (PRN):  acetaminophen   Tablet 650 milliGRAM(s) Oral every 6 hours PRN Mild Pain (1 - 3)  benzonatate 100 milliGRAM(s) Oral three times a day PRN Cough  HYDROcodone/homatropine Syrup 5 milliLiter(s) Oral every 4 hours PRN Cough  HYDROmorphone  Injectable 0.5 milliGRAM(s) IV Push every 4 hours PRN Severe Breakthrough pain (7-10)  oxyCODONE    5 mG/acetaminophen 325 mG 1 Tablet(s) Oral every 4 hours PRN Moderate Breakthrough pain (4 - 6)      PRESENT SYMPTOMS:  Source: [ ] Patient   [ ] Family   [ ] Team     Pain: [x ] YES - Patient states that his pain is "stable" to his left shoulder and his old pleurx catheter site (Left chest).  Unable to quantify pain or state what type of pain it is.  He reports that it started after the removal of his pleurx catheter about 1 month ago.  Aggravated by movement/coughing and relieved by opioids, no radiation.    Dyspnea: Denies currently     Anxiety: [ ] YES [x ] NO  Fatigue: [ ] YES [x ] NO   Nausea: [ ] YES [x ] NO  Loss of appetite: [ ] YES [x ] NO   Constipation: [ ] YES [x ] NO     Other Symptoms: +cough - he reports a worsening in his cough over the past 5 days (unable to state when it started) - reports improvement since admission on the regimen he is on currently   [ ] All other review of systems negative   [ ] Unable to obtain due to poor mentation     Does patient meet criteria for Severe Protein Calorie Malnutrition?  Yes [x ]  No [ ]  PPSV 30% or below [ ]  Anasarca [ ]  Albumin < 2 [ ] Catabolic State [x ] Poor nutritional intake [x ] Significant weight loss [ ]      Palliative Performance Status Version 2:   70-80%        Vital Signs Last 24 Hrs  T(C): 36.8 (12 Dec 2017 05:38), Max: 37.4 (11 Dec 2017 20:19)  T(F): 98.2 (12 Dec 2017 05:38), Max: 99.3 (11 Dec 2017 20:19)  HR: 120 (12 Dec 2017 05:38) (112 - 120)  BP: 131/88 (12 Dec 2017 05:38) (119/80 - 146/81)  BP(mean): --  RR: 18 (12 Dec 2017 05:38) (15 - 18)  SpO2: 93% (12 Dec 2017 05:38) (93% - 97%)    Physical Exam:    General: [x ] Alert,  A&O x  4   [ ] lethargic   [ ] Agitated   [ ] Cachexia   HEENT: [x ] Normal   [ ] Dry mouth   [ ] ET Tube    [ ] Trach   Lungs: Decreased breath sounds to left mid/lower lung    Cardiovascular:  Regular rhythm, tachycardic    Abdomen: [x ] Soft  [ ] Distended  [x ] +BS  [x ] Non tender [ ] Tender  [ ]PEG   [ ] NGT   Last BM: 12/11/17  Genitourinary: [x ] Normal [ ] Incontinent   [ ] Oliguria/Anuria   [ ] Cabral  Musculoskeletal:  [ ] Normal   [x ] Generalized weakness  [ ] Bedbound  [ ] Edema   Neurological: [x ] No focal deficits  [ ] Cognitive impairment     Skin: [x ] Normal   [ ] Pressure ulcers     LABS:                        10.3   5.11  )-----------( 284      ( 12 Dec 2017 05:40 )             30.0     12-12    128<L>  |  88<L>  |  10  ----------------------------<  101<H>  4.2   |  26  |  0.56    Ca    8.8      12 Dec 2017 05:40  Phos  3.5     12-12  Mg     1.9     12-12    TPro  6.5  /  Alb  3.3  /  TBili  0.4  /  DBili  x   /  AST  19  /  ALT  14  /  AlkPhos  82  12-11    PT/INR - ( 12 Dec 2017 05:40 )   PT: 15.3 SEC;   INR: 1.32          PTT - ( 12 Dec 2017 05:40 )  PTT:27.7 SEC    I&O's Summary      RADIOLOGY & ADDITIONAL STUDIES:  12/11/17 - CT chest  IMPRESSION: No pulmonary arterial emboli.    Small to moderate size multiloculated left pleural effusion with   associated pleural thickening and nodularity related to metastatic   disease. Central left lung mass encasing the bronchovascular bundles as   described above corresponding to the patient's given history of lung   cancer is difficult to accurately compare with the prior study of October 19, 2017.    Bilateral interlobular septal thickening with fissural nodularity and   thickening related to metastatic disease and lymphangitic spread of   neoplasm.    Right lung nodular patchy opacities with overall interval progression   since October 19, 2017. Differential diagnosis include progression of   neoplastic process or infection.      Referrals:  Hospice [ ]   Chaplaincy [ ]    Child Life [ ]   Social Work [ ]   Case Management [ ]   Holistic Therapy [ ]

## 2017-12-12 NOTE — CONSULT NOTE ADULT - ASSESSMENT
47 year old male with non small cell lung ca, palliative consulted for goals of care and symptom management

## 2017-12-12 NOTE — PROGRESS NOTE ADULT - PROBLEM SELECTOR PLAN 4
-s/p VATs and pleurx. With evidence of small-moderate loculated likely malignant effusion   -without signs of associated atelectasis  -may benefit from thoracentesis to r/o infection if worsening, for now monitor for for HD instability  - CT surgery evaluation

## 2017-12-12 NOTE — CONSULT NOTE ADULT - PROBLEM SELECTOR RECOMMENDATION 2
Spoke to outpatient supportive oncology, Dr. Mendez at length.  Goal is to titrate patient down on Oxycontin, given minimal relief of pain with up titration, with addition of neurontin with up titration - as pain was neuropathic in nature.  Patient unable to state type or quantity of pain currently, other than it is "stable".  Oxycontin 40mg PO BID unchanged.  Dilaudid discontinued.  Oxycodone 5mg PO Q3h PRN moderate and severe pain ordered.  Neurontin increased to 100mg PO TID.  Bowel regimen.  Will follow-up. Spoke to outpatient supportive oncology, Dr. Mendez at length.  Goal is to titrate patient down on Oxycontin, given minimal relief of pain with up titration, with addition of neurontin with up titration - as pain was neuropathic in nature, with possible addition of methadone.  Patient unable to state type or quantity of pain currently, other than it is "stable".  Oxycontin 40mg PO BID unchanged.  Dilaudid discontinued.  Oxycodone 5mg PO Q3h PRN moderate and severe pain ordered.  Neurontin increased to 100mg PO TID.  Bowel regimen.  Will follow-up. Spoke to outpatient supportive oncology, Dr. Mendez at length.  Goal is to titrate patient down on Oxycontin, given minimal relief of pain with up titration, with addition of neurontin with up titration - as pain was neuropathic in nature, with possible addition of methadone.  Patient unable to state type or quantity of pain currently, other than it is "stable".  Oxycontin 40mg PO BID unchanged.  Dilaudid discontinued.  Oxycodone 5mg PO Q3h PRN moderate and severe pain ordered.  Neurontin increased to 200mg PO TID.  Bowel regimen.  Will follow-up.

## 2017-12-12 NOTE — CONSULT NOTE ADULT - SUBJECTIVE AND OBJECTIVE BOX
Oncology Consult Note    HPI:  46 y/o M with a PMH of non-small cell carcinoma with mets to the bone and brain s/p VATs, brain radiation therapy  currently undergoing treatment with carboplatin, pemetrexed, and pembrolizumab presents to the ED with cough and SOB. Pt reports 5 days ago developed exacerbation of cough worse at night/early morning. States he has coughing fits which last ~10 minutes and result in SOB. Cough productive of white/clear sputum. Dyspnea resolves with resolution of cough. Reports some mild dyspnea on exertion that is unchanged. Denies fevers, chills, orthopnea, chest pain, palpitations, lower extremity swelling. He saw his PMD and was given Hycodan with mild relief of coughing spells therefore promoting his visit. Reports no changes in appetite or activity level. Denies abdominal pain, n/v, diarrhea or sick contacts. Complains only of chronic back and shoulder pain for which he is on chronic opioids.     Pt was diagnosed with metastatic adenocarcinoma of the lung in 9/17 after presenting with cough. He was found to have a left hilar mass which encased the LLL bronchus and pulmonary artery with large pleural effusion and adjacent atelectasis . He had left VATs and pleurX cath which was removed appropriately a month ago. Also with evidence of mets to the T 10 body and a ring enhancing lesion within the right precentral gyrus.     Allergies    ampicillin (Rash)    Intolerances        MEDICATIONS  (STANDING):  docusate sodium 100 milliGRAM(s) Oral three times a day  enoxaparin Injectable 40 milliGRAM(s) SubCutaneous every 24 hours  gabapentin 100 milliGRAM(s) Oral every 8 hours  lidocaine   Patch 1 Patch Transdermal daily  ondansetron Injectable 4 milliGRAM(s) IV Push every 6 hours  oxyCODONE  ER Tablet 40 milliGRAM(s) Oral every 12 hours  senna 2 Tablet(s) Oral at bedtime    MEDICATIONS  (PRN):  acetaminophen   Tablet 650 milliGRAM(s) Oral every 6 hours PRN Mild Pain (1 - 3)  benzonatate 100 milliGRAM(s) Oral three times a day PRN Cough  HYDROcodone/homatropine Syrup 5 milliLiter(s) Oral every 4 hours PRN Cough  HYDROmorphone  Injectable 0.5 milliGRAM(s) IV Push every 4 hours PRN Severe Breakthrough pain (7-10)  oxyCODONE    5 mG/acetaminophen 325 mG 1 Tablet(s) Oral every 4 hours PRN Moderate Breakthrough pain (4 - 6)      PAST MEDICAL & SURGICAL HISTORY:  Non-small cell lung cancer (NSCLC)  Pleural effusion: s/p pleurex placement/removal      FAMILY HISTORY:  Family history of lung cancer (Grandparent, Uncle)  Family history of colon cancer (Sibling)      SOCIAL HISTORY: No EtOH, no tobacco    REVIEW OF SYSTEMS:    CONSTITUTIONAL: + weakness no fevers, chills.   EYES/ENT: No visual changes;  No vertigo or throat pain   NECK: No pain or stiffness  RESPIRATORY: persistent intermittent productive cough. No wheezing, hemoptysis; No shortness of breath  CARDIOVASCULAR: No chest pain or palpitations  GASTROINTESTINAL: No abdominal or epigastric pain. No nausea, vomiting, or hematemesis; No diarrhea or constipation. No melena or hematochezia.  GENITOURINARY: No dysuria, frequency or hematuria  NEUROLOGICAL: No numbness or weakness  SKIN: No itching, burning, rashes, or lesions   All other review of systems is negative unless indicated above.    Height (cm): 162.56 (12-11 @ 20:19)  Weight (kg): 59 (12-11 @ 20:19)  BMI (kg/m2): 22.3 (12-11 @ 20:19)  BSA (m2): 1.63 (12-11 @ 20:19)    T(F): 98.2 (12-12-17 @ 05:38), Max: 99.3 (12-11-17 @ 20:19)  HR: 120 (12-12-17 @ 05:38)  BP: 131/88 (12-12-17 @ 05:38)  RR: 18 (12-12-17 @ 05:38)  SpO2: 93% (12-12-17 @ 05:38)  Wt(kg): --    GENERAL: NAD, thin-framed  HEAD:  Atraumatic, Normocephalic  EYES: EOMI, PERRLA, conjunctiva and sclera clear  NECK: Supple, No JVD  CHEST/LUNG: coughing, Clear to auscultation bilaterally  HEART: Regular rate and rhythm; No murmurs, rubs, or gallops  ABDOMEN: Soft, Nontender, Nondistended; Bowel sounds present  EXTREMITIES:  2+ Peripheral Pulses, No clubbing, cyanosis, or edema  NEUROLOGY: non-focal  SKIN: No rashes or lesions                          10.3   5.11  )-----------( 284      ( 12 Dec 2017 05:40 )             30.0       12-12    128<L>  |  88<L>  |  10  ----------------------------<  101<H>  4.2   |  26  |  0.56    Ca    8.8      12 Dec 2017 05:40  Phos  3.5     12-12  Mg     1.9     12-12    TPro  6.5  /  Alb  3.3  /  TBili  0.4  /  DBili  x   /  AST  19  /  ALT  14  /  AlkPhos  82  12-11      Phosphorus Level, Serum: 3.5 mg/dL (12-12 @ 05:40)  Magnesium, Serum: 1.9 mg/dL (12-12 @ 05:40)

## 2017-12-12 NOTE — CONSULT NOTE ADULT - SUBJECTIVE AND OBJECTIVE BOX
46 y/o M with a PMH of non-small cell carcinoma with mets to the bone and brain s/p VATs, radiation therapy  currently undergoing chemotherapy presents to the ED with cough and SOB. Pt reports 5 days ago developed exacerbation of cough worse at night/early morning. States he has coughing fits which last ~10 minutes and result in SOB. Cough productive of white/clear sputum. Dyspnea resolves with resolution of cough. Reports some mild dyspnea on exertion that is unchanged. Denies fevers, chills, orthopnea, chest pain, palpitations, lower extremity swelling. He saw his PMD and was given Hycodan with mild relief of coughing spells therefore promoting his visit. Reports no changes in appetite or activity level. Denies abdominal pain, n/v, diarrhea or sick contacts. Complains only of chronic back and shoulder pain for which he is on chronic opioids. Denies weakness, numbness, saddle aesthesia or bowel/bladder incontinence   Pt was diagnosed with NSC carcinoma in 9/17 after presenting with cough. He was found to have a left hilar mass which encased the LLL bronchus and pulmonary artery with large pleural effusion and adjacent atelectasis . He had left VATs and pleux cath which was removed appropriately a month ago. Also with evidence of mets to the T 10 body and a ring enhance lesion within the right precentral gyrus. He was treated with radiation therapy and is currently getting chemotherapy twice a week at the [facility] with Carboplatin, Alimta and keytruda.  Thoracic surgery called to evaluate left pleural effusion found on CT scan.      PAST MEDICAL & SURGICAL HISTORY:  Non-small cell lung cancer (NSCLC)  Pleural effusion: s/p pleurex placement/removal    ICU Vital Signs Last 24 Hrs  T(C): 36.8 (12 Dec 2017 14:24), Max: 37.4 (11 Dec 2017 20:19)  T(F): 98.3 (12 Dec 2017 14:24), Max: 99.3 (11 Dec 2017 20:19)  HR: 123 (12 Dec 2017 14:24) (112 - 123)  BP: 124/80 (12 Dec 2017 14:24) (119/80 - 131/88)  BP(mean): --  ABP: --  ABP(mean): --  RR: 19 (12 Dec 2017 14:24) (18 - 19)  SpO2: 95% (12 Dec 2017 14:24) (93% - 96%)    GENERAL: NAD, thin-framed  HEAD:  Atraumatic, Normocephalic  EYES: EOMI, PERRLA, conjunctiva and sclera clear  NECK: Supple, No JVD  CHEST/LUNG: coughing, Reduced breath sounds along left lung base   HEART: Regular rate and rhythm; No murmurs, rubs, or gallops  ABDOMEN: Soft, Nontender, Nondistended; Bowel sounds present  EXTREMITIES:  2+ Peripheral Pulses, No clubbing, cyanosis, or edema  NEUROLOGY: non-focal  SKIN: No rashes or lesions                          10.3   5.11  )-----------( 284      ( 12 Dec 2017 05:40 )             30.0     12-12    130<L>  |  91<L>  |  12  ----------------------------<  108<H>  4.4   |  26  |  0.78    Ca    8.4      12 Dec 2017 15:13  Phos  3.5     12-12  Mg     1.9     12-12    TPro  6.5  /  Alb  3.3  /  TBili  0.4  /  DBili  x   /  AST  19  /  ALT  14  /  AlkPhos  82  12-11       < end of copied text >  < from: CT Angio Chest w/ IV Cont (12.11.17 @ 15:05) >  IMPRESSION: No pulmonary arterial emboli.    Small to moderate size multiloculated left pleural effusion with   associated pleural thickening and nodularity related to metastatic   disease. Central left lung mass encasing the bronchovascular bundles as   described above corresponding to the patient's given history of lung   cancer is difficult to accurately compare with the prior study of October 19, 2017.    Bilateral interlobular septal thickening with fissural nodularity and   thickening related to metastatic disease and lymphangitic spread of   neoplasm.    Right lung nodular patchy opacities with overall interval progression   since October 19, 2017. Differential diagnosis include progression of   neoplastic process or infection.    < end of copied text >    MEDICATIONS  (STANDING):  benzonatate 100 milliGRAM(s) Oral three times a day  docusate sodium 100 milliGRAM(s) Oral three times a day  enoxaparin Injectable 40 milliGRAM(s) SubCutaneous every 24 hours  gabapentin 200 milliGRAM(s) Oral every 8 hours  lidocaine   Patch 1 Patch Transdermal daily  oxyCODONE  ER Tablet 40 milliGRAM(s) Oral every 12 hours  senna 2 Tablet(s) Oral at bedtime    MEDICATIONS  (PRN):  acetaminophen   Tablet 650 milliGRAM(s) Oral every 6 hours PRN Mild Pain (1 - 3)  HYDROcodone/homatropine Syrup 5 milliLiter(s) Oral every 4 hours PRN Cough  ondansetron Injectable 4 milliGRAM(s) IV Push every 6 hours PRN Nausea and/or Vomiting  oxyCODONE    IR 5 milliGRAM(s) Oral every 3 hours PRN moderate and severe pain

## 2017-12-12 NOTE — CONSULT NOTE ADULT - ASSESSMENT
47 M with medical history of adenocarcinoma of the lung with metastases to the bone and brain s/p VATs, brain-RT, and most recently s/p cycle 3 of carboplatin, pemetrexed, and pembrolizumab on 12/7 being managed for persistent intermittently productive cough and shortness of breath for past 1 week.    #Metastatic lung adenocarcinoma: 47 M with medical history of adenocarcinoma of the lung with metastases to the bone and brain s/p VATs, brain-RT, and most recently s/p cycle 3 of carboplatin, pemetrexed, and pembrolizumab on 12/7 being managed for persistent intermittently productive cough and shortness of breath for past 1 week.    #Metastatic lung adenocarcinoma:  - s/p cycle 3 of carboplatin, pemetrexed, and pembrolizumab on 12/7  - inpatient CT angio chest concerning for disease progress in right lung with increasing lung opacities compared to PET-CT from October 2017 and lymphangitic spread noted. Imaging reviewed with radiology  - currently no clinical suspicion for pneumonia  - appreciate palliative recommendations re: pain medications and cough suppressant. Can try codeine for cough if hydromet does not work.   - outpatient follow-up with oncologist, Dr. Hernandez, to discuss further treatment management

## 2017-12-12 NOTE — PROGRESS NOTE ADULT - SUBJECTIVE AND OBJECTIVE BOX
Patient is a 47y old  Male who presents with a chief complaint of cough and dyspnea (11 Dec 2017 22:47)      SUBJECTIVE / OVERNIGHT EVENTS:  Darrion ID # [id number] used for this encounter    Pt reported he had 7/10 pain in L shoulder this am. Also has pain whenever he coughs. Cough is the main thing bothering him, clear sputum. Reports none of the medications at home helped the cough but pain medication takes the pain away completely whenever he takes it. Denies fevers, chills, URI symptoms. Denies dyspnea. Eating well. Passing BMs. Usually ambulating at home but reports coughing made him fatigued    Wife also at bedside, anxious about the plan. Reports she had to go home and take 2 buses to go home because she has her own medical problems and medication to  at the pharmacy    MEDICATIONS  (STANDING): reviewed  docusate sodium 100 milliGRAM(s) Oral three times a day  enoxaparin Injectable 40 milliGRAM(s) SubCutaneous every 24 hours  gabapentin 100 milliGRAM(s) Oral every 8 hours  lidocaine   Patch 1 Patch Transdermal daily  oxyCODONE  ER Tablet 40 milliGRAM(s) Oral every 12 hours  senna 2 Tablet(s) Oral at bedtime    MEDICATIONS  (PRN):  acetaminophen   Tablet 650 milliGRAM(s) Oral every 6 hours PRN Mild Pain (1 - 3)  benzonatate 100 milliGRAM(s) Oral three times a day PRN Cough  HYDROcodone/homatropine Syrup 5 milliLiter(s) Oral every 4 hours PRN Cough  HYDROmorphone  Injectable 0.5 milliGRAM(s) IV Push every 4 hours PRN Severe Breakthrough pain (7-10)  ondansetron Injectable 4 milliGRAM(s) IV Push every 6 hours PRN Nausea and/or Vomiting  oxyCODONE    5 mG/acetaminophen 325 mG 1 Tablet(s) Oral every 4 hours PRN Moderate Breakthrough pain (4 - 6)        CAPILLARY BLOOD GLUCOSE        I&O's Summary    Vital Signs Last 24 Hrs  T(C): 36.8 (12 Dec 2017 14:24), Max: 37.4 (11 Dec 2017 20:19)  T(F): 98.3 (12 Dec 2017 14:24), Max: 99.3 (11 Dec 2017 20:19)  HR: 123 (12 Dec 2017 14:24) (112 - 123)  BP: 124/80 (12 Dec 2017 14:24) (119/80 - 131/88)  BP(mean): --  RR: 19 (12 Dec 2017 14:24) (16 - 19)  SpO2: 95% (12 Dec 2017 14:24) (93% - 97%)    PHYSICAL EXAM:  GENERAL: Thin, Crying due to pain this am,  HEENT:: EOMI, PERRLA, conjunctiva and sclera clear, MMM, no thrush  NECK: Supple,  no LAD  CHEST/LUNG: Good air entry b/l, no tachypnea, or accessory muscle use, decreased breath sounds mid L lung to base, no crackles, wheezing or rhonchi  HEART: S1/S2, Regular rate and rhythm  ABDOMEN: Nondistended, NABS, soft,  nontender,  EXTREMITIES:  2+ Peripheral Pulses, no clubbing, cyanosis, no calf or LE edema  PSYCH: AAOx3, normal affect  NEUROLOGY: CN II-XII intact, 5/5 strength in upper and lower extremities  SKIN: No rashes or lesions    LABS: reviewed                        10.3   5.11  )-----------( 284      ( 12 Dec 2017 05:40 )             30.0     12-12    128<L>  |  88<L>  |  10  ----------------------------<  101<H>  4.2   |  26  |  0.56    Ca    8.8      12 Dec 2017 05:40  Phos  3.5     12-12  Mg     1.9     12-12    TPro  6.5  /  Alb  3.3  /  TBili  0.4  /  DBili  x   /  AST  19  /  ALT  14  /  AlkPhos  82  12-11    PT/INR - ( 12 Dec 2017 05:40 )   PT: 15.3 SEC;   INR: 1.32          PTT - ( 12 Dec 2017 05:40 )  PTT:27.7 SEC      RADIOLOGY & ADDITIONAL TESTS:    Imaging Personally Reviewed: CT Chest reviewed- < from: CT Angio Chest w/ IV Cont (12.11.17 @ 15:05) >  No pulmonary arterial emboli.    Small to moderate size multiloculated left pleural effusion with   associated pleural thickening and nodularity related to metastatic   disease. Central left lung mass encasing the bronchovascular bundles as   described above corresponding to the patient's given history of lung   cancer is difficult to accurately compare with the prior study of October 19, 2017.    Bilateral interlobular septal thickening with fissural nodularity and   thickening related to metastatic disease and lymphangitic spread of   neoplasm.    Right lung nodular patchy opacities with overall interval progression   since October 19, 2017. Differential diagnosis include progression of   neoplastic process or infection.      < end of copied text >      Consultant(s) Notes Reviewed:      Care Discussed with Consultants/Other Providers: oncology and pall care during interdisciplinary rounds re: CT and pain

## 2017-12-12 NOTE — PROGRESS NOTE ADULT - PROBLEM SELECTOR PLAN 6
-likely 2/2 chronic disease vs bone marrow suppression due to CT  -continue to trend, monitor for signs of bleeding

## 2017-12-12 NOTE — CONSULT NOTE ADULT - PROBLEM SELECTOR RECOMMENDATION 4
Plan for follow-up with oncology as an outpatient.  Follow-up with Dr. Mendez for supportive oncology at the [facility] [phone].  Psychosocial support provided.

## 2017-12-12 NOTE — CONSULT NOTE ADULT - PROBLEM SELECTOR RECOMMENDATION 3
Unclear etiology, likely multifactorial.  CT surgery to see today.  Patient's oxycontin decreased as an outpatient from TID to BID, which may have been treating his cough.  Neurontin up titrated.  Tessalon changed to standing q8h, Hydromet 5ml q4h PRN (patient reports relief with hydromet).

## 2017-12-13 ENCOUNTER — TRANSCRIPTION ENCOUNTER (OUTPATIENT)
Age: 47
End: 2017-12-13

## 2017-12-13 DIAGNOSIS — E43 UNSPECIFIED SEVERE PROTEIN-CALORIE MALNUTRITION: ICD-10-CM

## 2017-12-13 LAB
ALBUMIN SERPL ELPH-MCNC: 3.3 G/DL — SIGNIFICANT CHANGE UP (ref 3.3–5)
ALP SERPL-CCNC: 82 U/L — SIGNIFICANT CHANGE UP (ref 40–120)
ALT FLD-CCNC: 19 U/L — SIGNIFICANT CHANGE UP (ref 4–41)
ANISOCYTOSIS BLD QL: SLIGHT — SIGNIFICANT CHANGE UP
AST SERPL-CCNC: 21 U/L — SIGNIFICANT CHANGE UP (ref 4–40)
BASOPHILS # BLD AUTO: 0.05 K/UL — SIGNIFICANT CHANGE UP (ref 0–0.2)
BASOPHILS NFR BLD AUTO: 1.5 % — SIGNIFICANT CHANGE UP (ref 0–2)
BASOPHILS NFR SPEC: 6.4 % — HIGH (ref 0–2)
BILIRUB SERPL-MCNC: 0.5 MG/DL — SIGNIFICANT CHANGE UP (ref 0.2–1.2)
BUN SERPL-MCNC: 11 MG/DL — SIGNIFICANT CHANGE UP (ref 7–23)
CALCIUM SERPL-MCNC: 8.9 MG/DL — SIGNIFICANT CHANGE UP (ref 8.4–10.5)
CHLORIDE SERPL-SCNC: 90 MMOL/L — LOW (ref 98–107)
CO2 SERPL-SCNC: 23 MMOL/L — SIGNIFICANT CHANGE UP (ref 22–31)
CREAT SERPL-MCNC: 0.57 MG/DL — SIGNIFICANT CHANGE UP (ref 0.5–1.3)
EOSINOPHIL # BLD AUTO: 0.17 K/UL — SIGNIFICANT CHANGE UP (ref 0–0.5)
EOSINOPHIL NFR BLD AUTO: 5.2 % — SIGNIFICANT CHANGE UP (ref 0–6)
EOSINOPHIL NFR FLD: 4.6 % — SIGNIFICANT CHANGE UP (ref 0–6)
GIANT PLATELETS BLD QL SMEAR: PRESENT — SIGNIFICANT CHANGE UP
GLUCOSE SERPL-MCNC: 92 MG/DL — SIGNIFICANT CHANGE UP (ref 70–99)
HCT VFR BLD CALC: 30.6 % — LOW (ref 39–50)
HGB BLD-MCNC: 10.3 G/DL — LOW (ref 13–17)
HYPOCHROMIA BLD QL: SLIGHT — SIGNIFICANT CHANGE UP
IMM GRANULOCYTES # BLD AUTO: 0.07 # — SIGNIFICANT CHANGE UP
IMM GRANULOCYTES NFR BLD AUTO: 2.1 % — HIGH (ref 0–1.5)
LYMPHOCYTES # BLD AUTO: 0.42 K/UL — LOW (ref 1–3.3)
LYMPHOCYTES # BLD AUTO: 12.8 % — LOW (ref 13–44)
LYMPHOCYTES NFR SPEC AUTO: 5.5 % — LOW (ref 13–44)
MACROCYTES BLD QL: SLIGHT — SIGNIFICANT CHANGE UP
MAGNESIUM SERPL-MCNC: 1.8 MG/DL — SIGNIFICANT CHANGE UP (ref 1.6–2.6)
MCHC RBC-ENTMCNC: 28.9 PG — SIGNIFICANT CHANGE UP (ref 27–34)
MCHC RBC-ENTMCNC: 33.7 % — SIGNIFICANT CHANGE UP (ref 32–36)
MCV RBC AUTO: 85.7 FL — SIGNIFICANT CHANGE UP (ref 80–100)
MONOCYTES # BLD AUTO: 0.37 K/UL — SIGNIFICANT CHANGE UP (ref 0–0.9)
MONOCYTES NFR BLD AUTO: 11.2 % — SIGNIFICANT CHANGE UP (ref 2–14)
MONOCYTES NFR BLD: 16.5 % — HIGH (ref 2–9)
NEUTROPHIL AB SER-ACNC: 63.3 % — SIGNIFICANT CHANGE UP (ref 43–77)
NEUTROPHILS # BLD AUTO: 2.21 K/UL — SIGNIFICANT CHANGE UP (ref 1.8–7.4)
NEUTROPHILS NFR BLD AUTO: 67.2 % — SIGNIFICANT CHANGE UP (ref 43–77)
NEUTS BAND # BLD: 1.9 % — SIGNIFICANT CHANGE UP (ref 0–6)
NRBC # FLD: 0 — SIGNIFICANT CHANGE UP
OVALOCYTES BLD QL SMEAR: SLIGHT — SIGNIFICANT CHANGE UP
PHOSPHATE SERPL-MCNC: 3.1 MG/DL — SIGNIFICANT CHANGE UP (ref 2.5–4.5)
PLATELET # BLD AUTO: 232 K/UL — SIGNIFICANT CHANGE UP (ref 150–400)
PLATELET COUNT - ESTIMATE: NORMAL — SIGNIFICANT CHANGE UP
PMV BLD: 9.7 FL — SIGNIFICANT CHANGE UP (ref 7–13)
POIKILOCYTOSIS BLD QL AUTO: SLIGHT — SIGNIFICANT CHANGE UP
POLYCHROMASIA BLD QL SMEAR: SLIGHT — SIGNIFICANT CHANGE UP
POTASSIUM SERPL-MCNC: 4.3 MMOL/L — SIGNIFICANT CHANGE UP (ref 3.5–5.3)
POTASSIUM SERPL-SCNC: 4.3 MMOL/L — SIGNIFICANT CHANGE UP (ref 3.5–5.3)
PROT SERPL-MCNC: 6.7 G/DL — SIGNIFICANT CHANGE UP (ref 6–8.3)
RBC # BLD: 3.57 M/UL — LOW (ref 4.2–5.8)
RBC # FLD: 14.7 % — HIGH (ref 10.3–14.5)
REVIEW TO FOLLOW: YES — SIGNIFICANT CHANGE UP
SODIUM SERPL-SCNC: 128 MMOL/L — LOW (ref 135–145)
SPHEROCYTES BLD QL SMEAR: SLIGHT — SIGNIFICANT CHANGE UP
VARIANT LYMPHS # BLD: 1.8 % — SIGNIFICANT CHANGE UP
WBC # BLD: 3.29 K/UL — LOW (ref 3.8–10.5)
WBC # FLD AUTO: 3.29 K/UL — LOW (ref 3.8–10.5)

## 2017-12-13 PROCEDURE — 99233 SBSQ HOSP IP/OBS HIGH 50: CPT

## 2017-12-13 RX ORDER — SODIUM CHLORIDE 9 MG/ML
1000 INJECTION INTRAMUSCULAR; INTRAVENOUS; SUBCUTANEOUS
Qty: 0 | Refills: 0 | Status: DISCONTINUED | OUTPATIENT
Start: 2017-12-13 | End: 2017-12-14

## 2017-12-13 RX ORDER — SODIUM CHLORIDE 9 MG/ML
1000 INJECTION INTRAMUSCULAR; INTRAVENOUS; SUBCUTANEOUS
Qty: 0 | Refills: 0 | Status: DISCONTINUED | OUTPATIENT
Start: 2017-12-13 | End: 2017-12-13

## 2017-12-13 RX ADMIN — OXYCODONE HYDROCHLORIDE 5 MILLIGRAM(S): 5 TABLET ORAL at 10:40

## 2017-12-13 RX ADMIN — GABAPENTIN 200 MILLIGRAM(S): 400 CAPSULE ORAL at 21:54

## 2017-12-13 RX ADMIN — Medication 100 MILLIGRAM(S): at 21:50

## 2017-12-13 RX ADMIN — SODIUM CHLORIDE 250 MILLILITER(S): 9 INJECTION INTRAMUSCULAR; INTRAVENOUS; SUBCUTANEOUS at 14:00

## 2017-12-13 RX ADMIN — OXYCODONE HYDROCHLORIDE 5 MILLIGRAM(S): 5 TABLET ORAL at 09:46

## 2017-12-13 RX ADMIN — OXYCODONE HYDROCHLORIDE 5 MILLIGRAM(S): 5 TABLET ORAL at 02:11

## 2017-12-13 RX ADMIN — OXYCODONE HYDROCHLORIDE 40 MILLIGRAM(S): 5 TABLET ORAL at 05:47

## 2017-12-13 RX ADMIN — LIDOCAINE 1 PATCH: 4 CREAM TOPICAL at 12:21

## 2017-12-13 RX ADMIN — Medication 100 MILLIGRAM(S): at 15:10

## 2017-12-13 RX ADMIN — GABAPENTIN 200 MILLIGRAM(S): 400 CAPSULE ORAL at 05:48

## 2017-12-13 RX ADMIN — ENOXAPARIN SODIUM 40 MILLIGRAM(S): 100 INJECTION SUBCUTANEOUS at 05:47

## 2017-12-13 RX ADMIN — LIDOCAINE 1 PATCH: 4 CREAM TOPICAL at 00:31

## 2017-12-13 RX ADMIN — OXYCODONE HYDROCHLORIDE 5 MILLIGRAM(S): 5 TABLET ORAL at 03:03

## 2017-12-13 RX ADMIN — ONDANSETRON 4 MILLIGRAM(S): 8 TABLET, FILM COATED ORAL at 21:50

## 2017-12-13 RX ADMIN — OXYCODONE HYDROCHLORIDE 5 MILLIGRAM(S): 5 TABLET ORAL at 19:00

## 2017-12-13 RX ADMIN — SENNA PLUS 2 TABLET(S): 8.6 TABLET ORAL at 21:54

## 2017-12-13 RX ADMIN — Medication 100 MILLIGRAM(S): at 05:48

## 2017-12-13 RX ADMIN — OXYCODONE HYDROCHLORIDE 40 MILLIGRAM(S): 5 TABLET ORAL at 18:09

## 2017-12-13 RX ADMIN — OXYCODONE HYDROCHLORIDE 40 MILLIGRAM(S): 5 TABLET ORAL at 18:07

## 2017-12-13 RX ADMIN — SODIUM CHLORIDE 250 MILLILITER(S): 9 INJECTION INTRAMUSCULAR; INTRAVENOUS; SUBCUTANEOUS at 09:48

## 2017-12-13 RX ADMIN — GABAPENTIN 200 MILLIGRAM(S): 400 CAPSULE ORAL at 15:10

## 2017-12-13 RX ADMIN — OXYCODONE HYDROCHLORIDE 5 MILLIGRAM(S): 5 TABLET ORAL at 18:07

## 2017-12-13 RX ADMIN — Medication 100 MILLIGRAM(S): at 21:54

## 2017-12-13 RX ADMIN — Medication 100 MILLIGRAM(S): at 05:47

## 2017-12-13 RX ADMIN — OXYCODONE HYDROCHLORIDE 5 MILLIGRAM(S): 5 TABLET ORAL at 12:50

## 2017-12-13 RX ADMIN — OXYCODONE HYDROCHLORIDE 5 MILLIGRAM(S): 5 TABLET ORAL at 13:45

## 2017-12-13 NOTE — PROGRESS NOTE ADULT - PROBLEM SELECTOR PLAN 3
Patient reports a mild improvement but complained that he is not getting Hydromet consistently.  Educated yesterday that Hydromet is PRN and he would need to request if needed.  Patient voiced frustration.  Hydromet changed to q4h standing.  Tessalon and neurontin continues. Patient reports a mild improvement but complained that he is not getting Hydromet consistently.  Educated yesterday that Hydromet is PRN and he would need to request if needed.  Patient voiced frustration.  Hydromet changed to q6h standing.  Tessalon and neurontin continues.

## 2017-12-13 NOTE — CHART NOTE - NSCHARTNOTEFT_GEN_A_CORE
Pt. seen with Dr. Trista Weston explained to pt. and pt's wife no need for any pleural drainage in her opinion.  Will sign off at this time. Re-call with any new questions or concerns  Continue care as per primary medical team.

## 2017-12-13 NOTE — DISCHARGE NOTE ADULT - HOSPITAL COURSE
48 y/o M with a PMH of non-small cell carcinoma with mets to the bone and brain s/p VATs, radiation therapy  currently undergoing chemotherapy presents to the ED with cough and shortness of breath.  Cough.    -exacerbation of cough at night, non productive without associated fevers, chills. Likely related to progression of disease vs viral/bacterial infection  Patient is afebrile, no leukocytosis. Without signs of respiratory distress on exam. Oxygen saturation 95-97% on RA. CT of the chest negative for pulmonary embolism. Evidence of right nodular opacities which may represent infectious process however without signs of ongoing infection  hycodan, tessalon Perles  -duonebs as needed  -Incentive spirometer       Pain, neoplasm-related. Non-small cell lung cancer (NSCLC)  Planappreciate [employer] pain mgmt notes from Dr. Briscoe-Leeanna  Patient placed on home dose oxycodone 40mg ER BID, gabapentin 100mg TID  oxycodone, Dilaudid PRNs for breakthrough pain  pain exacerbated by coughing, c/w hycodan   palliative consulted -c/b metastatic disease with back and shoulder pain, no evidence of cord compression  -Patient is currently on chemotherapy as outpatient  - He is to continue with OP pain meds, plan to down titrate opioids by increasing gabapentin. Lidocaine for back pain. Continue with bowel regimen    Pleural Effusion  -Heme/Onc consulted >s/p VATs and pleurx. With evidence of small-moderate loculated likely malignant effusion   -without signs of associated atelectasis.. May benefit from thoracentesis to r/o infection if worsening, for now monitor for for HD instability .  Cough and shortness of breath likely secondary to underlying malignancy. Patient has no signs of infection, normal white count and afebrile. Would NOT intervene on effusion as currently asymptomatic   No thoracic surgery intervention indicated at this time, continue care as per primary team         Patient is medically stable for discharge 46 y/o M with a PMH of non-small cell carcinoma with mets to the bone and brain s/p VATs, radiation therapy  currently undergoing chemotherapy presents to the ED with cough and shortness of breath.  Cough.    -exacerbation of cough at night, non productive without associated fevers, chills. Likely related to progression of disease vs viral/bacterial infection  Patient is afebrile, no leukocytosis. Without signs of respiratory distress on exam. Oxygen saturation 95-97% on RA. CT of the chest negative for pulmonary embolism. Evidence of right nodular opacities which may represent infectious process however without signs of ongoing infection  hycodan, tessalon Perles  -duonebs as needed  -Incentive spirometer   -Oxygen saturation 95-97% on RA. CT of the chest negative for pulmonary embolism.       Pain, neoplasm-related. Non-small cell lung cancer (NSCLC)  Plan appreciate [employer] pain mgmt notes from Dr. Mendez  Patient placed on home dose oxycodone 40mg ER BID, gabapentin 100mg TID increased to 200 mg   Oxycodone, Dilaudid PRNs for breakthrough pain  pain exacerbated by coughing, c/w hycodan   palliative consulted -c/b metastatic disease with back and shoulder pain, no evidence of cord compression  -Patient is currently on chemotherapy as outpatient  - He is to continue with OP pain meds, plan to down titrate opioids by increasing gabapentin. Lidocaine for back pain. Continue with bowel regimen    Pleural Effusion  -Heme/Onc consulted >s/p VATs and pleurx. With evidence of small-moderate loculated likely malignant effusion   -without signs of associated atelectasis.. May benefit from thoracentesis to r/o infection if worsening, for now monitor for for HD instability .  Cough and shortness of breath likely secondary to underlying malignancy. Patient has no signs of infection, normal white count and afebrile. Would NOT intervene on effusion as currently asymptomatic   - CT surgery evaluation- no need for intervention at this time, likely not contributing to current symptoms, no hypoxia.      ISTOP Reference #  Patient is medically stable for discharge 46 y/o M with a PMH of non-small cell carcinoma with mets to the bone and brain s/p VATs, radiation therapy  currently undergoing chemotherapy presents to the ED with cough and shortness of breath.    Cough.    -exacerbation of cough at night, non productive without associated fevers, chills. Likely related to progression of disease vs viral/bacterial infection  Patient is afebrile, no leukocytosis. Without signs of respiratory distress on exam. Oxygen saturation 95-97% on RA. CT of the chest negative for pulmonary embolism. Evidence of right nodular opacities which may represent infectious process however without signs of ongoing infection  hycodan, tessalon Perles  -xu as needed  -Incentive spirometer   -Oxygen saturation 95-97% on RA. CT of the chest negative for pulmonary embolism.       Pain, neoplasm-related. Non-small cell lung cancer (NSCLC)  Plan appreciate [employer] pain mgmt notes from Dr. Mendez  Patient placed on home dose oxycodone 40mg ER BID, gabapentin 100mg TID increased to 200 mg   Oxycodone, Dilaudid PRNs for breakthrough pain  pain exacerbated by coughing, c/w hycodan   palliative consulted -c/b metastatic disease with back and shoulder pain, no evidence of cord compression  -Patient is currently on chemotherapy as outpatient  - He is to continue with OP pain meds, plan to down titrate opioids by increasing gabapentin. Lidocaine for back pain. Continue with bowel regimen    Pleural Effusion  -Heme/Onc consulted >s/p VATs and pleurx prior. With evidence of small-moderate loculated likely malignant effusion   -without signs of associated atelectasis.. Cough and shortness of breath likely secondary to underlying malignancy. Patient has no signs of infection, normal white count and afebrile.   - CT surgery evaluation- Would NOT intervene on effusion as currently asymptomatic.     ISTOP Reference #  Patient is medically stable for discharge

## 2017-12-13 NOTE — PROGRESS NOTE ADULT - PROBLEM SELECTOR PLAN 4
-s/p VATs and pleurex. With evidence of small-moderate loculated likely malignant effusion   -without signs of associated atelectasis  -may benefit from thoracentesis to r/o infection if worsening, for now monitor for for HD instability  - CT surgery evaluation- no need for intervention at this time, likely not contributing to current symptoms, no hypoxia -s/p VATs and pleurex. With evidence of small-moderate loculated likely malignant effusion   - CT surgery evaluation- no need for intervention at this time, likely not contributing to current symptoms, no hypoxia

## 2017-12-13 NOTE — DISCHARGE NOTE ADULT - MEDICATION SUMMARY - MEDICATIONS TO STOP TAKING
I will STOP taking the medications listed below when I get home from the hospital:    oxyCODONE-acetaminophen 5 mg-325 mg oral tablet  -- 1 tab(s) by mouth every 4 hours, As Needed -Moderate Pain (4 - 6) - for severe pain MDD:6

## 2017-12-13 NOTE — DISCHARGE NOTE ADULT - PLAN OF CARE
Follow up with dr phelps on discharge ([facility])  [phone]  within 1 week -likely 2/2 chronic disease vs bone marrow suppression Continue pain medication continue antitussive Please follow up with Dr. phelps on discharge ([facility]) Call  [phone] to schedule appointment within 1 week Please continue antitussives( hydromet, tessalon perles) likely secondary to chronic disease Your Gabapentin increased to 200 mg every 8 hours No need for pleural drainage. Outpatient follow up Please follow up with Dr. phelps on discharge ([facility]) Call  [phone] to schedule appointment within 1 week Your Gabapentin dose had increased to 200 mg every 8 hours Please follow up with Oncologist and Dr. phelps on discharge ([facility]) Call  [phone] to schedule appointment within 1 week Continue pain medication. Please follow up with Dr. phelps on discharge ([facility]) Call  [phone] to schedule appointment within 1 week

## 2017-12-13 NOTE — DISCHARGE NOTE ADULT - MEDICATION SUMMARY - MEDICATIONS TO TAKE
I will START or STAY ON the medications listed below when I get home from the hospital:    rollator  -- 1 Rollator   -- Indication: For Rollator    acetaminophen 325 mg oral tablet  -- 2 tab(s) by mouth every 6 hours, As needed, Mild Pain (1 - 3)  -- Indication: For Mild pain    oxyCODONE 40 mg oral tablet, extended release  -- 1 tab(s) by mouth every 12 hours  -- Indication: For Pain, neoplasm-related    oxyCODONE 5 mg oral tablet  -- 1 tab(s) by mouth every 3 hours, As Needed for moderate to severe pain  -- Indication: For Moderate to severe     gabapentin 100 mg oral capsule  -- 2 cap(s) by mouth every 8 hours, As Needed  -for severe pain   -- Indication: For Pain, neoplasm-related    Zofran 4 mg oral tablet  -- 1 tab(s) by mouth every 8 hours, As Needed - for nausea  -- Indication: For Nausea     benzonatate 100 mg oral capsule  -- 1 cap(s) by mouth 3 times a day  -- Indication: For Cough    docusate sodium 100 mg oral capsule  -- 1 cap(s) by mouth 3 times a day  -- Indication: For Constipation    senna oral tablet  -- 2 tab(s) by mouth once a day (at bedtime)  -- Indication: For Constipation    hydrocodone-homatropine 5 mg-1.5 mg/5 mL oral syrup  -- 5 milliliter(s) by mouth every 6 hours MDD:20 mg  -- Indication: For Cough I will START or STAY ON the medications listed below when I get home from the hospital:    rollator  -- 1 Rollator   -- Indication: For Rollator    oxyCODONE 40 mg oral tablet, extended release  -- 1 tab(s) by mouth every 12 hours  -- Indication: For Pain, neoplasm-related    oxyCODONE 5 mg oral tablet  -- 1 tab(s) by mouth every 3 hours, As Needed for moderate to severe pain  -- Indication: For Moderate to severe     acetaminophen 325 mg oral tablet  -- 2 tab(s) by mouth every 6 hours, As needed, Mild Pain (1 - 3)  -- Indication: For Mild pain    gabapentin 100 mg oral capsule  -- 2 cap(s) by mouth every 8 hours, As Needed  -for severe pain   -- Indication: For Pain, neoplasm-related    Zofran 4 mg oral tablet  -- 1 tab(s) by mouth every 8 hours, As Needed - for nausea  -- Indication: For Nausea     benzonatate 100 mg oral capsule  -- 1 cap(s) by mouth 3 times a day  -- Indication: For Cough    docusate sodium 100 mg oral capsule  -- 1 cap(s) by mouth 3 times a day  -- Indication: For Constipation    senna oral tablet  -- 2 tab(s) by mouth once a day (at bedtime)  -- Indication: For Constipation    hydrocodone-homatropine 5 mg-1.5 mg/5 mL oral syrup  -- 5 milliliter(s) by mouth every 6 hours MDD:20 mg  -- Indication: For Cough

## 2017-12-13 NOTE — PROGRESS NOTE ADULT - PROBLEM SELECTOR PLAN 3
-c/b metastatic disease with back and shoulder pain, no evidence of cord compression  -currently on chemotherapy as outpatient  -continue with OP pain meds, plan to down titrate opioids by increasing gabapentin. Lidocaine for back pain. Continue with bowel regimen  -gentle IV hydration  - F/u onc consult

## 2017-12-13 NOTE — PROGRESS NOTE ADULT - SUBJECTIVE AND OBJECTIVE BOX
Patient is a 47y old  Male who presents with a chief complaint of cough and dyspnea (13 Dec 2017 12:54)      SUBJECTIVE / OVERNIGHT EVENTS:  Ayush # [id number] used for this encounter.     Pt complains cough is persistent but he feels less "suffocated" than before when he coughs. Reports pain is controlled and not as bad as it was before when he was coughing.  Reports coughing up clear sputum , no phlegm or blood. Reports he hasn't gotten cough syrup but he didn't ask for it. Reports he had trouble sleeping and might be better off at home in his own bed which is more comfortable especially since the medications aren't helping his cough. Eating well. Reports he hasn't had a BM in 2 days but felt the urge to go today. Asking whether cold water or room temperature water is better for his cough    MEDICATIONS  (STANDING): reviewed  benzonatate 100 milliGRAM(s) Oral three times a day  docusate sodium 100 milliGRAM(s) Oral three times a day  enoxaparin Injectable 40 milliGRAM(s) SubCutaneous every 24 hours  gabapentin 200 milliGRAM(s) Oral every 8 hours  lidocaine   Patch 1 Patch Transdermal daily  oxyCODONE  ER Tablet 40 milliGRAM(s) Oral every 12 hours  senna 2 Tablet(s) Oral at bedtime  sodium chloride 0.9%. 1000 milliLiter(s) (250 mL/Hr) IV Continuous <Continuous>    MEDICATIONS  (PRN):  acetaminophen   Tablet 650 milliGRAM(s) Oral every 6 hours PRN Mild Pain (1 - 3)  HYDROcodone/homatropine Syrup 5 milliLiter(s) Oral every 4 hours PRN Cough  ondansetron Injectable 4 milliGRAM(s) IV Push every 6 hours PRN Nausea and/or Vomiting  oxyCODONE    IR 5 milliGRAM(s) Oral every 3 hours PRN moderate and severe pain        CAPILLARY BLOOD GLUCOSE        I&O's Summary    12 Dec 2017 07:01  -  13 Dec 2017 07:00  --------------------------------------------------------  IN: 100 mL / OUT: 200 mL / NET: -100 mL    13 Dec 2017 07:01  -  13 Dec 2017 14:49  --------------------------------------------------------  IN: 540 mL / OUT: 0 mL / NET: 540 mL      Vital Signs Last 24 Hrs  T(C): 37.4 (13 Dec 2017 13:06), Max: 37.4 (13 Dec 2017 06:28)  T(F): 99.3 (13 Dec 2017 13:06), Max: 99.4 (13 Dec 2017 06:28)  HR: 87 (13 Dec 2017 13:06) (87 - 110)  BP: 124/78 (13 Dec 2017 13:06) (117/76 - 126/78)  BP(mean): --  RR: 18 (13 Dec 2017 06:28) (18 - 18)  SpO2: 96% (13 Dec 2017 13:06) (96% - 100%)    PHYSICAL EXAM:  GENERAL: No acute distress  HEENT: EOMI, PERRLA, conjunctiva and sclera clear  NECK: Supple  CHEST/LUNG: Clear to auscultation bilaterally  HEART: S1/S2, Regular rate and rhythm  ABDOMEN: Nondistended, NABS, soft,  nontender,   EXTREMITIES:  2+ Peripheral Pulses, no clubbing, cyanosis, or edema  PSYCH: AAOx3, normal affect  NEUROLOGY: CN II-XII intact, 5/5 strength in upper and lower extremities    LABS: reviewed                        10.3   3.29  )-----------( 232      ( 13 Dec 2017 06:18 )             30.6     12-13    128<L>  |  90<L>  |  11  ----------------------------<  92  4.3   |  23  |  0.57    Ca    8.9      13 Dec 2017 06:18  Phos  3.1     12-13  Mg     1.8     12-13    TPro  6.7  /  Alb  3.3  /  TBili  0.5  /  DBili  x   /  AST  21  /  ALT  19  /  AlkPhos  82  12-13    PT/INR - ( 12 Dec 2017 05:40 )   PT: 15.3 SEC;   INR: 1.32          PTT - ( 12 Dec 2017 05:40 )  PTT:27.7 SEC      RADIOLOGY & ADDITIONAL TESTS:    Imaging Personally Reviewed:    Consultant(s) Notes Reviewed:      Care Discussed with Consultants/Other Providers: Oncology and palliative care. Cough likely 2/2 progression of disease. Plan to continue symptom control

## 2017-12-13 NOTE — DISCHARGE NOTE ADULT - MEDICATION SUMMARY - MEDICATIONS TO CHANGE
I will SWITCH the dose or number of times a day I take the medications listed below when I get home from the hospital:    gabapentin 100 mg oral capsule  -- 1 cap(s) by mouth every 8 hours, As Needed -for severe pain    OxyCONTIN 40 mg oral tablet, extended release  -- 1 tab(s) by mouth every 8 hours

## 2017-12-13 NOTE — DISCHARGE NOTE ADULT - PATIENT PORTAL LINK FT
“You can access the [facility] Patient Portal, offered by Health system, by registering with the following website: [email or web address]”

## 2017-12-13 NOTE — DISCHARGE NOTE ADULT - CARE PLAN
Principal Discharge DX:	Non-small cell lung cancer (NSCLC)  Instructions for follow-up, activity and diet:	Follow up with dr phelps on discharge ([facility])  [phone]  within 1 week  Secondary Diagnosis:	Cough  Secondary Diagnosis:	Anemia  Instructions for follow-up, activity and diet:	-likely 2/2 chronic disease vs bone marrow suppression  Secondary Diagnosis:	Pain, neoplasm-related  Instructions for follow-up, activity and diet:	Continue pain medication  Secondary Diagnosis:	Palliative care encounter  Secondary Diagnosis:	Pleural effusion Principal Discharge DX:	Non-small cell lung cancer (NSCLC)  Instructions for follow-up, activity and diet:	Follow up with dr phelps on discharge ([facility])  [phone]  within 1 week  Secondary Diagnosis:	Cough  Instructions for follow-up, activity and diet:	continue antitussive  Secondary Diagnosis:	Anemia  Instructions for follow-up, activity and diet:	-likely 2/2 chronic disease vs bone marrow suppression  Secondary Diagnosis:	Pain, neoplasm-related  Instructions for follow-up, activity and diet:	Continue pain medication  Secondary Diagnosis:	Palliative care encounter  Secondary Diagnosis:	Pleural effusion Principal Discharge DX:	Non-small cell lung cancer (NSCLC)  Instructions for follow-up, activity and diet:	Please follow up with Dr. phelps on discharge ([facility]) Call  [phone] to schedule appointment within 1 week  Secondary Diagnosis:	Cough  Instructions for follow-up, activity and diet:	Please continue antitussives( hydromet, tessalon perles)  Secondary Diagnosis:	Anemia  Instructions for follow-up, activity and diet:	likely secondary to chronic disease  Secondary Diagnosis:	Pain, neoplasm-related  Instructions for follow-up, activity and diet:	Continue pain medication  Secondary Diagnosis:	Palliative care encounter  Instructions for follow-up, activity and diet:	Your Gabapentin increased to 200 mg every 8 hours  Secondary Diagnosis:	Pleural effusion  Instructions for follow-up, activity and diet:	No need for pleural drainage. Principal Discharge DX:	Non-small cell lung cancer (NSCLC)  Goal:	Outpatient follow up  Instructions for follow-up, activity and diet:	Please follow up with Dr. phelps on discharge ([facility]) Call  [phone] to schedule appointment within 1 week  Secondary Diagnosis:	Cough  Instructions for follow-up, activity and diet:	Please continue antitussives( hydromet, tessalon perles)  Secondary Diagnosis:	Anemia  Instructions for follow-up, activity and diet:	likely secondary to chronic disease  Secondary Diagnosis:	Pain, neoplasm-related  Instructions for follow-up, activity and diet:	Continue pain medication  Secondary Diagnosis:	Palliative care encounter  Instructions for follow-up, activity and diet:	Your Gabapentin dose had increased to 200 mg every 8 hours  Secondary Diagnosis:	Pleural effusion  Instructions for follow-up, activity and diet:	No need for pleural drainage. Principal Discharge DX:	Non-small cell lung cancer (NSCLC)  Goal:	Outpatient follow up  Instructions for follow-up, activity and diet:	Please follow up with Oncologist and Dr. phelps on discharge ([facility]) Call  [phone] to schedule appointment within 1 week  Secondary Diagnosis:	Cough  Instructions for follow-up, activity and diet:	Please continue antitussives( hydromet, tessalon perles)  Secondary Diagnosis:	Anemia  Instructions for follow-up, activity and diet:	likely secondary to chronic disease  Secondary Diagnosis:	Pain, neoplasm-related  Instructions for follow-up, activity and diet:	Continue pain medication. Please follow up with Dr. phelps on discharge ([facility]) Call  [phone] to schedule appointment within 1 week  Secondary Diagnosis:	Palliative care encounter  Instructions for follow-up, activity and diet:	Your Gabapentin dose had increased to 200 mg every 8 hours  Secondary Diagnosis:	Pleural effusion  Instructions for follow-up, activity and diet:	No need for pleural drainage.

## 2017-12-13 NOTE — PROGRESS NOTE ADULT - PROBLEM SELECTOR PLAN 5
Problem: Safety  Goal: Will remain free from falls    Intervention: Assess risk factors for falls   11/11/17 8168   OTHER   Mobility Status Assessment 2-2 Healthcare Providers Required for Assistance with Ambulation & Transfer   Continues to have strip and built in bed alarm.        Problem: Bowel/Gastric:  Goal: Will not experience complications related to bowel motility    Intervention: Assess baseline bowel pattern  LBM today         -Pt with baseline Na 131-133, Na dropped today  - Restarted on IVF  - Encourage po  - Monitor BMP closely

## 2017-12-13 NOTE — PROGRESS NOTE ADULT - SUBJECTIVE AND OBJECTIVE BOX
INTERVAL HPI/OVERNIGHT EVENTS: Patient states he is "a little better", continues to complain about his cough - patient ambulating in hallway without difficulty     Allergies    ampicillin (Rash)    Intolerances        Code Status: Full code     PRESENT SYMPTOMS:   SOURCE:  [x ] Patient   [ ] Family   [ ] Team     Pain: Patient states left shoulder pain is "ok"- patient reports left old pleurx catheter site pain is 2/10 currently - unable to state the type of pain   Dyspnea [ ] YES [x ] NO    Anxiety:  [ ] YES [x ] NO  Fatigue: [ ] YES [x ] NO  Nausea: [ ] YES [x ] NO  Loss of Appetite: [ ] YES [x ] NO  Constipation [ ] YES   [ x] No     OTHER SYMPTOMS: +cough   [ ] All other ROS negative     [ ] Unable to obtain due to poor mentation    Does the patient meet criteria for Severe Protein Calorie Malnutrition?  Yes [ ]  No [x ]   PPSV less than <30% [ ]  Anasarca [ ]  Albumin <2 [ ]  Catabolic State [ ]  Poor nutritional intake [ ]  Significant weight loss [ ]     MEDICATIONS  (STANDING):  benzonatate 100 milliGRAM(s) Oral three times a day  docusate sodium 100 milliGRAM(s) Oral three times a day  enoxaparin Injectable 40 milliGRAM(s) SubCutaneous every 24 hours  gabapentin 200 milliGRAM(s) Oral every 8 hours  HYDROcodone/homatropine Syrup 5 milliLiter(s) Oral every 4 hours  lidocaine   Patch 1 Patch Transdermal daily  oxyCODONE  ER Tablet 40 milliGRAM(s) Oral every 12 hours  senna 2 Tablet(s) Oral at bedtime  sodium chloride 0.9%. 1000 milliLiter(s) (250 mL/Hr) IV Continuous <Continuous>    MEDICATIONS  (PRN):  acetaminophen   Tablet 650 milliGRAM(s) Oral every 6 hours PRN Mild Pain (1 - 3)  ondansetron Injectable 4 milliGRAM(s) IV Push every 6 hours PRN Nausea and/or Vomiting  oxyCODONE    IR 5 milliGRAM(s) Oral every 3 hours PRN moderate and severe pain      Palliative Performance Status Version 2:   70-80%         Physical Exam:    General: [x ] Alert,  A&O x 4 - in Georgian    HEENT: [x ] Normal   [ ] Dry mouth   [ ] ET Tube    [ ] Trach   Lungs: [x ] Clear [ ] Rhonchi  [ ] Crackles [ ] Wheezing [ ] Tachypnea  [ ] Audible excessive secretions   Cardiovascular:  [ x] Regular rate and rhythm  [ ] Irregular [ ] Tachycardia   [ ] Bradycardia   Abdomen: [x ] Soft  [ ] Distended  [ ]  [x ] +BS  [x ] Non tender  Last BM: 12/12/17     Genitourinary:  [x ] Normal [ ] Incontinent   [ ] Oliguria/Anuria   [ ] Cabral  Musculoskeletal:  [x ] Normal   [ ] Generalized weakness  [ ] Bedbound  [ ] Edema   Neurological: [x ] No focal deficits  [ ] Cognitive impairment     Skin: [x ] Normal   [ ] Pressure ulcers     Vital Signs Last 24 Hrs  T(C): 37.4 (13 Dec 2017 13:06), Max: 37.4 (13 Dec 2017 06:28)  T(F): 99.3 (13 Dec 2017 13:06), Max: 99.4 (13 Dec 2017 06:28)  HR: 87 (13 Dec 2017 13:06) (87 - 110)  BP: 124/78 (13 Dec 2017 13:06) (117/76 - 126/78)  BP(mean): --  RR: 18 (13 Dec 2017 06:28) (18 - 18)  SpO2: 96% (13 Dec 2017 13:06) (96% - 100%)    LABS:                        10.3   3.29  )-----------( 232      ( 13 Dec 2017 06:18 )             30.6     12-13    128<L>  |  90<L>  |  11  ----------------------------<  92  4.3   |  23  |  0.57    Ca    8.9      13 Dec 2017 06:18  Phos  3.1     12-13  Mg     1.8     12-13    TPro  6.7  /  Alb  3.3  /  TBili  0.5  /  DBili  x   /  AST  21  /  ALT  19  /  AlkPhos  82  12-13    PT/INR - ( 12 Dec 2017 05:40 )   PT: 15.3 SEC;   INR: 1.32          PTT - ( 12 Dec 2017 05:40 )  PTT:27.7 SEC    I&O's Summary    12 Dec 2017 07:01  -  13 Dec 2017 07:00  --------------------------------------------------------  IN: 100 mL / OUT: 200 mL / NET: -100 mL    13 Dec 2017 07:01  -  13 Dec 2017 16:48  --------------------------------------------------------  IN: 540 mL / OUT: 250 mL / NET: 290 mL        RADIOLOGY & ADDITIONAL STUDIES: INTERVAL HPI/OVERNIGHT EVENTS: Patient states he is "a little better", continues to complain about his cough - patient ambulating in hallway without difficulty     Allergies    ampicillin (Rash)    Intolerances        Code Status: Full code     PRESENT SYMPTOMS:   SOURCE:  [x ] Patient   [ ] Family   [ ] Team     Pain: Patient states left shoulder pain is "ok"- patient reports left old pleurx catheter site pain is 2/10 currently - unable to state the type of pain   Dyspnea [ ] YES [x ] NO    Anxiety:  [ ] YES [x ] NO  Fatigue: [ ] YES [x ] NO  Nausea: [ ] YES [x ] NO  Loss of Appetite: [ ] YES [x ] NO  Constipation [ ] YES   [ x] No     OTHER SYMPTOMS: +cough   [ ] All other ROS negative     [ ] Unable to obtain due to poor mentation    Does the patient meet criteria for Severe Protein Calorie Malnutrition?  Yes [x ]  No [ ]   PPSV less than <30% [ ]  Anasarca [ ]  Albumin <2 [ ]  Catabolic State [ x]  Poor nutritional intake [x ]  Significant weight loss [ ]     MEDICATIONS  (STANDING):  benzonatate 100 milliGRAM(s) Oral three times a day  docusate sodium 100 milliGRAM(s) Oral three times a day  enoxaparin Injectable 40 milliGRAM(s) SubCutaneous every 24 hours  gabapentin 200 milliGRAM(s) Oral every 8 hours  HYDROcodone/homatropine Syrup 5 milliLiter(s) Oral every 4 hours  lidocaine   Patch 1 Patch Transdermal daily  oxyCODONE  ER Tablet 40 milliGRAM(s) Oral every 12 hours  senna 2 Tablet(s) Oral at bedtime  sodium chloride 0.9%. 1000 milliLiter(s) (250 mL/Hr) IV Continuous <Continuous>    MEDICATIONS  (PRN):  acetaminophen   Tablet 650 milliGRAM(s) Oral every 6 hours PRN Mild Pain (1 - 3)  ondansetron Injectable 4 milliGRAM(s) IV Push every 6 hours PRN Nausea and/or Vomiting  oxyCODONE    IR 5 milliGRAM(s) Oral every 3 hours PRN moderate and severe pain      Palliative Performance Status Version 2:   70-80%         Physical Exam:    General: [x ] Alert,  A&O x 4 - in Icelandic    HEENT: [x ] Normal   [ ] Dry mouth   [ ] ET Tube    [ ] Trach   Lungs: [x ] Clear [ ] Rhonchi  [ ] Crackles [ ] Wheezing [ ] Tachypnea  [ ] Audible excessive secretions   Cardiovascular:  [ x] Regular rate and rhythm  [ ] Irregular [ ] Tachycardia   [ ] Bradycardia   Abdomen: [x ] Soft  [ ] Distended  [ ]  [x ] +BS  [x ] Non tender  Last BM: 12/12/17     Genitourinary:  [x ] Normal [ ] Incontinent   [ ] Oliguria/Anuria   [ ] Cabral  Musculoskeletal:  [x ] Normal   [ ] Generalized weakness  [ ] Bedbound  [ ] Edema   Neurological: [x ] No focal deficits  [ ] Cognitive impairment     Skin: [x ] Normal   [ ] Pressure ulcers     Vital Signs Last 24 Hrs  T(C): 37.4 (13 Dec 2017 13:06), Max: 37.4 (13 Dec 2017 06:28)  T(F): 99.3 (13 Dec 2017 13:06), Max: 99.4 (13 Dec 2017 06:28)  HR: 87 (13 Dec 2017 13:06) (87 - 110)  BP: 124/78 (13 Dec 2017 13:06) (117/76 - 126/78)  BP(mean): --  RR: 18 (13 Dec 2017 06:28) (18 - 18)  SpO2: 96% (13 Dec 2017 13:06) (96% - 100%)    LABS:                        10.3   3.29  )-----------( 232      ( 13 Dec 2017 06:18 )             30.6     12-13    128<L>  |  90<L>  |  11  ----------------------------<  92  4.3   |  23  |  0.57    Ca    8.9      13 Dec 2017 06:18  Phos  3.1     12-13  Mg     1.8     12-13    TPro  6.7  /  Alb  3.3  /  TBili  0.5  /  DBili  x   /  AST  21  /  ALT  19  /  AlkPhos  82  12-13    PT/INR - ( 12 Dec 2017 05:40 )   PT: 15.3 SEC;   INR: 1.32          PTT - ( 12 Dec 2017 05:40 )  PTT:27.7 SEC    I&O's Summary    12 Dec 2017 07:01  -  13 Dec 2017 07:00  --------------------------------------------------------  IN: 100 mL / OUT: 200 mL / NET: -100 mL    13 Dec 2017 07:01  -  13 Dec 2017 16:48  --------------------------------------------------------  IN: 540 mL / OUT: 250 mL / NET: 290 mL        RADIOLOGY & ADDITIONAL STUDIES:

## 2017-12-14 VITALS — WEIGHT: 140.88 LBS

## 2017-12-14 LAB
BUN SERPL-MCNC: 7 MG/DL — SIGNIFICANT CHANGE UP (ref 7–23)
CALCIUM SERPL-MCNC: 8.7 MG/DL — SIGNIFICANT CHANGE UP (ref 8.4–10.5)
CHLORIDE SERPL-SCNC: 93 MMOL/L — LOW (ref 98–107)
CO2 SERPL-SCNC: 24 MMOL/L — SIGNIFICANT CHANGE UP (ref 22–31)
CREAT SERPL-MCNC: 0.53 MG/DL — SIGNIFICANT CHANGE UP (ref 0.5–1.3)
GLUCOSE SERPL-MCNC: 86 MG/DL — SIGNIFICANT CHANGE UP (ref 70–99)
HCT VFR BLD CALC: 29.3 % — LOW (ref 39–50)
HGB BLD-MCNC: 10.1 G/DL — LOW (ref 13–17)
MAGNESIUM SERPL-MCNC: 1.6 MG/DL — SIGNIFICANT CHANGE UP (ref 1.6–2.6)
MCHC RBC-ENTMCNC: 30.1 PG — SIGNIFICANT CHANGE UP (ref 27–34)
MCHC RBC-ENTMCNC: 34.5 % — SIGNIFICANT CHANGE UP (ref 32–36)
MCV RBC AUTO: 87.2 FL — SIGNIFICANT CHANGE UP (ref 80–100)
NRBC # FLD: 0 — SIGNIFICANT CHANGE UP
PHOSPHATE SERPL-MCNC: 3 MG/DL — SIGNIFICANT CHANGE UP (ref 2.5–4.5)
PLATELET # BLD AUTO: 192 K/UL — SIGNIFICANT CHANGE UP (ref 150–400)
PMV BLD: 9.8 FL — SIGNIFICANT CHANGE UP (ref 7–13)
POTASSIUM SERPL-MCNC: 4.4 MMOL/L — SIGNIFICANT CHANGE UP (ref 3.5–5.3)
POTASSIUM SERPL-SCNC: 4.4 MMOL/L — SIGNIFICANT CHANGE UP (ref 3.5–5.3)
RBC # BLD: 3.36 M/UL — LOW (ref 4.2–5.8)
RBC # FLD: 14.7 % — HIGH (ref 10.3–14.5)
SODIUM SERPL-SCNC: 129 MMOL/L — LOW (ref 135–145)
WBC # BLD: 3.95 K/UL — SIGNIFICANT CHANGE UP (ref 3.8–10.5)
WBC # FLD AUTO: 3.95 K/UL — SIGNIFICANT CHANGE UP (ref 3.8–10.5)

## 2017-12-14 PROCEDURE — 99239 HOSP IP/OBS DSCHRG MGMT >30: CPT

## 2017-12-14 RX ORDER — OXYCODONE HYDROCHLORIDE 5 MG/1
1 TABLET ORAL
Qty: 40 | Refills: 0 | OUTPATIENT
Start: 2017-12-14 | End: 2017-12-18

## 2017-12-14 RX ORDER — OXYCODONE HYDROCHLORIDE 5 MG/1
5 TABLET ORAL
Qty: 0 | Refills: 0 | Status: DISCONTINUED | OUTPATIENT
Start: 2017-12-14 | End: 2017-12-14

## 2017-12-14 RX ORDER — GABAPENTIN 400 MG/1
2 CAPSULE ORAL
Qty: 84 | Refills: 0 | OUTPATIENT
Start: 2017-12-14 | End: 2017-12-27

## 2017-12-14 RX ORDER — OXYCODONE HYDROCHLORIDE 5 MG/1
1 TABLET ORAL
Qty: 0 | Refills: 0 | COMMUNITY

## 2017-12-14 RX ORDER — OXYCODONE HYDROCHLORIDE 5 MG/1
1 TABLET ORAL
Qty: 0 | Refills: 0 | COMMUNITY
Start: 2017-12-14

## 2017-12-14 RX ORDER — GABAPENTIN 400 MG/1
2 CAPSULE ORAL
Qty: 180 | Refills: 0 | OUTPATIENT
Start: 2017-12-14 | End: 2018-01-12

## 2017-12-14 RX ORDER — OXYCODONE HYDROCHLORIDE 5 MG/1
1 TABLET ORAL
Qty: 10 | Refills: 0 | OUTPATIENT
Start: 2017-12-14 | End: 2017-12-18

## 2017-12-14 RX ORDER — OXYCODONE HYDROCHLORIDE 5 MG/1
40 TABLET ORAL EVERY 12 HOURS
Qty: 0 | Refills: 0 | Status: DISCONTINUED | OUTPATIENT
Start: 2017-12-14 | End: 2017-12-14

## 2017-12-14 RX ADMIN — ONDANSETRON 4 MILLIGRAM(S): 8 TABLET, FILM COATED ORAL at 12:55

## 2017-12-14 RX ADMIN — Medication 100 MILLIGRAM(S): at 05:38

## 2017-12-14 RX ADMIN — OXYCODONE HYDROCHLORIDE 5 MILLIGRAM(S): 5 TABLET ORAL at 03:21

## 2017-12-14 RX ADMIN — ENOXAPARIN SODIUM 40 MILLIGRAM(S): 100 INJECTION SUBCUTANEOUS at 05:39

## 2017-12-14 RX ADMIN — OXYCODONE HYDROCHLORIDE 5 MILLIGRAM(S): 5 TABLET ORAL at 09:58

## 2017-12-14 RX ADMIN — LIDOCAINE 1 PATCH: 4 CREAM TOPICAL at 14:00

## 2017-12-14 RX ADMIN — GABAPENTIN 200 MILLIGRAM(S): 400 CAPSULE ORAL at 13:59

## 2017-12-14 RX ADMIN — OXYCODONE HYDROCHLORIDE 40 MILLIGRAM(S): 5 TABLET ORAL at 17:19

## 2017-12-14 RX ADMIN — LIDOCAINE 1 PATCH: 4 CREAM TOPICAL at 00:55

## 2017-12-14 RX ADMIN — Medication 100 MILLIGRAM(S): at 13:59

## 2017-12-14 RX ADMIN — OXYCODONE HYDROCHLORIDE 5 MILLIGRAM(S): 5 TABLET ORAL at 09:28

## 2017-12-14 RX ADMIN — Medication 100 MILLIGRAM(S): at 05:39

## 2017-12-14 RX ADMIN — OXYCODONE HYDROCHLORIDE 5 MILLIGRAM(S): 5 TABLET ORAL at 14:33

## 2017-12-14 RX ADMIN — OXYCODONE HYDROCHLORIDE 40 MILLIGRAM(S): 5 TABLET ORAL at 05:38

## 2017-12-14 RX ADMIN — OXYCODONE HYDROCHLORIDE 40 MILLIGRAM(S): 5 TABLET ORAL at 17:49

## 2017-12-14 RX ADMIN — OXYCODONE HYDROCHLORIDE 5 MILLIGRAM(S): 5 TABLET ORAL at 14:03

## 2017-12-14 RX ADMIN — GABAPENTIN 200 MILLIGRAM(S): 400 CAPSULE ORAL at 05:38

## 2017-12-14 RX ADMIN — OXYCODONE HYDROCHLORIDE 5 MILLIGRAM(S): 5 TABLET ORAL at 02:36

## 2017-12-14 NOTE — DIETITIAN INITIAL EVALUATION ADULT. - PROBLEM SELECTOR PLAN 2
appreciate [employer] pain mgmt notes from Dr. Mendez  will place on oxycodone 40mg ER BID, gabapentin 100mg TID  oxycodone, dilaudid PRNs for breakthrough pain  pain exacerbated by coughing, c/w hycodan   palliative c/s

## 2017-12-14 NOTE — DIETITIAN INITIAL EVALUATION ADULT. - PROBLEM SELECTOR PLAN 4
-s/p VATs and pleurx. With evidence of small-moderate loculated likely malignant effusion   -without signs of associated atelectasis  -may benefit from thoracentesis to r/o infection if worsening, for now monitor for for HD instability

## 2017-12-14 NOTE — PROGRESS NOTE ADULT - ASSESSMENT
46 y/o M with a PMH of non-small cell carcinoma with mets to the bone and brain s/p VATs, radiation therapy  currently undergoing chemotherapy presents to the ED with cough and shortness of breath likely 2/2 progression of disease.
48 y/o M with a PMH of non-small cell carcinoma with mets to the bone and brain s/p VATs, radiation therapy  currently undergoing chemotherapy presents to the ED with cough and shortness of breath likely 2/2 progression of disease.
48 y/o M with a PMH of non-small cell carcinoma with mets to the bone and brain s/p VATs, radiation therapy  currently undergoing chemotherapy presents to the ED with cough and shortness of breath.
47 year old male metastatic non small cell lung ca - palliative following for symptom management

## 2017-12-14 NOTE — PROGRESS NOTE ADULT - SUBJECTIVE AND OBJECTIVE BOX
Patient is a 47y old  Male who presents with a chief complaint of cough and dyspnea (13 Dec 2017 12:54)     ID# [id number] used for this encounter    SUBJECTIVE / OVERNIGHT EVENTS: No overnight events. Pt reports cough slightly improved. Better when he takes the syrup. Slept better overnight. Pain controlled. Felt better after showering this am. Reports BM yesterday and one this morning.     MEDICATIONS  (STANDING): reviewed  benzonatate 100 milliGRAM(s) Oral three times a day  docusate sodium 100 milliGRAM(s) Oral three times a day  enoxaparin Injectable 40 milliGRAM(s) SubCutaneous every 24 hours  gabapentin 200 milliGRAM(s) Oral every 8 hours  HYDROcodone/homatropine Syrup 5 milliLiter(s) Oral every 6 hours  lidocaine   Patch 1 Patch Transdermal daily  oxyCODONE  ER Tablet 40 milliGRAM(s) Oral every 12 hours  senna 2 Tablet(s) Oral at bedtime    MEDICATIONS  (PRN):  acetaminophen   Tablet 650 milliGRAM(s) Oral every 6 hours PRN Mild Pain (1 - 3)  ondansetron Injectable 4 milliGRAM(s) IV Push every 6 hours PRN Nausea and/or Vomiting  oxyCODONE    IR 5 milliGRAM(s) Oral every 3 hours PRN moderate and severe pain        CAPILLARY BLOOD GLUCOSE        I&O's Summary    13 Dec 2017 07:01  -  14 Dec 2017 07:00  --------------------------------------------------------  IN: 1570 mL / OUT: 7400 mL / NET: -5830 mL      Vital Signs Last 24 Hrs  T(C): 37.4 (14 Dec 2017 13:01), Max: 37.4 (14 Dec 2017 13:01)  T(F): 99.3 (14 Dec 2017 13:01), Max: 99.3 (14 Dec 2017 13:01)  HR: 94 (14 Dec 2017 13:01) (84 - 103)  BP: 121/81 (14 Dec 2017 13:01) (121/81 - 129/80)  BP(mean): --  RR: 18 (14 Dec 2017 05:35) (18 - 18)  SpO2: 98% (14 Dec 2017 13:01) (96% - 98%)    PHYSICAL EXAM:  GENERAL:  No acute distress  HEENT: EOMI, PERRLA, conjunctiva and sclera clear  NECK: Supple  OP: MMM, no thrush  CHEST/LUNG: Clear to auscultation bilaterally  HEART: S1/S2, Regular rate and rhythm  ABDOMEN: Nondistended, NABS, soft,  nontender  EXTREMITIES:  2+ Peripheral Pulses, no clubbing, cyanosis, or edema  PSYCH: AAOx3, normal affect  NEUROLOGY: CN II-XII intact, 5/5 strength in upper and lower extremities  SKIN: mild erythema in pattern of EKG stickers on his chest    LABS: reviewed                        10.1   3.95  )-----------( 192      ( 14 Dec 2017 05:30 )             29.3     12-14    129<L>  |  93<L>  |  7   ----------------------------<  86  4.4   |  24  |  0.53    Ca    8.7      14 Dec 2017 05:30  Phos  3.0     12-14  Mg     1.6     12-14    TPro  6.7  /  Alb  3.3  /  TBili  0.5  /  DBili  x   /  AST  21  /  ALT  19  /  AlkPhos  82  12-13    RADIOLOGY & ADDITIONAL TESTS:    Imaging Personally Reviewed:    Consultant(s) Notes Reviewed:  CT surgery, pall care,     Care Discussed with Consultants/Other Providers: Oncology and pall care re: discharge planning today and outpatient f/u

## 2017-12-14 NOTE — DIETITIAN INITIAL EVALUATION ADULT. - OTHER INFO
Pt seen for consult for nutrition assessment, 46 y/o admitted with the DX of lung CA, pleural effusion, pt reports weight loss of 12#in 2 months related with poor po and  nausea and cough ,denies vomiting and diarrhea at this time, consuming <50%  of food served  for BF. Food options discussed, no food preferences at this time. Labs reviewed, continue with current diet with  nutrition supplement. Based on nutrition assessment pt  meets the criteria for severe malnutrition.

## 2017-12-14 NOTE — DIETITIAN INITIAL EVALUATION ADULT. - PROBLEM SELECTOR PLAN 5
-Pt with baseline Na 131-133  -Euvolemic hyponatremia 2/2 to dehydration vs SIADH  -will check urine lytes  -continue to trend

## 2017-12-14 NOTE — PROGRESS NOTE ADULT - PROBLEM SELECTOR PLAN 1
-exacerbation of cough at night, non productive without associated fevers, chills. Likely related to progression of disease less likely infection as pt without signs of infection   -No current signs of respiratory distress on exam. Oxygen saturation 95-97% on RA. CT of the chest negative for pulmonary embolism.   - CT surgery consult appreciated  -continue with hycodan, tessalon perles. higher dose gabapentin may also help with the cough  -duonebs as needed  -Incentive spirometer
-exacerbation of cough at night, non productive without associated fevers, chills. Likely related to progression of disease less likely infection as pt without signs of infection   -No current signs of respiratory distress on exam. Oxygen saturation 95-97% on RA. CT of the chest negative for pulmonary embolism. Evidence of right nodular opacities which may represent infectious process however without signs of ongoing infection  - CT surgery consult appreciated  -continue with hycodan, tessalon perles. higher dose gabapentin may also help with the cough  -duonebs as needed  -Incentive spirometer
-exacerbation of cough at night, non productive without associated fevers, chills. Likely related to progression of disease vs viral/bacterial infection  -afebrile, no leukocytosis  -without signs of respiratory distress on exam. Oxygen saturation 95-97% on RA. CT of the chest negative for pulmonary embolism. Evidence of right nodular opacities which may represent infectious process however without signs of ongoing infection  - Will consult pt's CT surgeon given loculated effusion seen on CT  -continue with hycodan, tessalon perles  -duonebs as needed  -Incentive spirometer
Will follow-up with Caroline upon discharge.

## 2017-12-14 NOTE — DIETITIAN INITIAL EVALUATION ADULT. - PROBLEM SELECTOR PLAN 3
-c/b metastatic disease with back and shoulder pain, no evidence of cord compression  -currently on chemotherapy as outpatient  -continue with OP pain meds, plan to down titrate opioids by increasing gabapentin. Lidocaine for back pain. Continue with bowel regimen  -gentle IV hydration  -Heme/Onc consult in AM

## 2017-12-14 NOTE — PROGRESS NOTE ADULT - PROBLEM SELECTOR PROBLEM 3
Non-small cell lung cancer (NSCLC)
Cough

## 2017-12-14 NOTE — PROGRESS NOTE ADULT - PROBLEM SELECTOR PLAN 2
Continue oxycodone 40mg ER BID, gabapentin 200mg TID  oxycodone, dilaudid PRNs for breakthrough pain  pain exacerbated by coughing, c/w hycodan   palliative c/s discussed w/ pall care. Will f/u final recs
appreciate [employer] pain mgmt notes from Dr. Mendez  Continue oxycodone 40mg ER BID, gabapentin 100mg TID  oxycodone, dilaudid PRNs for breakthrough pain  pain exacerbated by coughing, c/w hycodan   palliative c/s discussed w/ pall care. Will f/u final recs
appreciate [employer] pain mgmt notes from Dr. Mendez  Continue oxycodone 40mg ER BID, gabapentin 100mg TID  oxycodone, dilaudid PRNs for breakthrough pain  pain exacerbated by coughing, c/w hycodan   palliative c/s discussed w/ pall care. Will f/u final recs
Patient reports improvement in pain and states that with Oxycodone PRN pain decreases to 1/10.  No changes to current regimen.  Continue Oxycontin 40mg PO BID, neurontin 100mg PO TID, with oxycodone 5mg PO q3h PRN.  Bowel regimen.

## 2017-12-14 NOTE — DIETITIAN INITIAL EVALUATION ADULT. - PROBLEM SELECTOR PLAN 1
-exacerbation of cough at night, non productive without associated fevers, chills. Likely related to progression of disease vs viral/bacterial infection  -afebrile, no leukocytosis  -without signs of respiratory distress on exam. Oxygen saturation 95-97% on RA. CT of the chest negative for pulmonary embolism. Evidence of right nodular opacities which may represent infectious process however without signs of ongoing infection  -continue with hycodan, tessalon perles  -duonebs as needed  -Incentive spirometer

## 2017-12-14 NOTE — PROGRESS NOTE ADULT - ATTENDING COMMENTS
Agree with above
Pt medically stable for d/c home with outpatient oncology and palliative care follow-up. Case d/w onc and pall care in IDR. D/c planning time 35 mins

## 2017-12-14 NOTE — CHART NOTE - NSCHARTNOTEFT_GEN_A_CORE
NUTRITION SERVICES     Upon Nutritional Assessment by the Registered Dietitian your patient was determined to meet criteria/ has evidence of the following diagnosis/diagnoses:  [ ] Mild Protein Calorie Malnutrition   [ ] Moderate Protein Calorie Malnutrition   [x ] Severe Protein Calorie Malnutrition   [ ] Unspecified Protein Calorie Malnutrition   [ ] Underweight / BMI <19  [x ] poor po  [ x]un intentional weight loss    Findings as based on:  •  Comprehensive nutrition assessment and consultation      •  Food acceptance and intake status .  •  Patient education     Treatment:  The following diet has been recommended: Continue with regular die with Ensure Enlive 1 can po 3 x daily. RD remains available. Refer to initial assessment for complete note.

## 2017-12-18 ENCOUNTER — RESULT REVIEW (OUTPATIENT)
Age: 47
End: 2017-12-18

## 2017-12-18 ENCOUNTER — APPOINTMENT (OUTPATIENT)
Dept: GERIATRICS | Facility: CLINIC | Age: 47
End: 2017-12-18
Payer: MEDICAID

## 2017-12-18 ENCOUNTER — APPOINTMENT (OUTPATIENT)
Dept: HEMATOLOGY ONCOLOGY | Facility: CLINIC | Age: 47
End: 2017-12-18

## 2017-12-18 VITALS
BODY MASS INDEX: 23.68 KG/M2 | RESPIRATION RATE: 17 BRPM | OXYGEN SATURATION: 93 % | SYSTOLIC BLOOD PRESSURE: 127 MMHG | HEART RATE: 117 BPM | DIASTOLIC BLOOD PRESSURE: 84 MMHG | WEIGHT: 121.25 LBS | TEMPERATURE: 99.9 F

## 2017-12-18 DIAGNOSIS — R52 PAIN, UNSPECIFIED: ICD-10-CM

## 2017-12-18 DIAGNOSIS — H93.8X9 OTHER SPECIFIED DISORDERS OF EAR, UNSPECIFIED EAR: ICD-10-CM

## 2017-12-18 DIAGNOSIS — M79.2 NEURALGIA AND NEURITIS, UNSPECIFIED: ICD-10-CM

## 2017-12-18 LAB
ALBUMIN SERPL ELPH-MCNC: 3.7 G/DL — SIGNIFICANT CHANGE UP (ref 3.3–5)
ALP SERPL-CCNC: 101 U/L — SIGNIFICANT CHANGE UP (ref 40–120)
ALT FLD-CCNC: 23 U/L — SIGNIFICANT CHANGE UP (ref 10–45)
ANION GAP SERPL CALC-SCNC: 11 MMOL/L — SIGNIFICANT CHANGE UP (ref 5–17)
AST SERPL-CCNC: 21 U/L — SIGNIFICANT CHANGE UP (ref 10–40)
BILIRUB SERPL-MCNC: 0.3 MG/DL — SIGNIFICANT CHANGE UP (ref 0.2–1.2)
BUN SERPL-MCNC: 10 MG/DL — SIGNIFICANT CHANGE UP (ref 7–23)
CALCIUM SERPL-MCNC: 9 MG/DL — SIGNIFICANT CHANGE UP (ref 8.4–10.5)
CEA SERPL-MCNC: 207.3 NG/ML — HIGH (ref 0–3.8)
CHLORIDE SERPL-SCNC: 88 MMOL/L — LOW (ref 96–108)
CO2 SERPL-SCNC: 29 MMOL/L — SIGNIFICANT CHANGE UP (ref 22–31)
CREAT SERPL-MCNC: 0.65 MG/DL — SIGNIFICANT CHANGE UP (ref 0.5–1.3)
EOSINOPHIL # BLD AUTO: 0.2 K/UL — SIGNIFICANT CHANGE UP (ref 0–0.5)
EOSINOPHIL NFR BLD AUTO: 2 % — SIGNIFICANT CHANGE UP (ref 0–6)
GLUCOSE SERPL-MCNC: 93 MG/DL — SIGNIFICANT CHANGE UP (ref 70–99)
HCT VFR BLD CALC: 29.5 % — LOW (ref 39–50)
HGB BLD-MCNC: 10.6 G/DL — LOW (ref 13–17)
LYMPHOCYTES # BLD AUTO: 0.3 K/UL — LOW (ref 1–3.3)
LYMPHOCYTES # BLD AUTO: 7 % — LOW (ref 13–44)
MCHC RBC-ENTMCNC: 31.6 PG — SIGNIFICANT CHANGE UP (ref 27–34)
MCHC RBC-ENTMCNC: 36 G/DL — SIGNIFICANT CHANGE UP (ref 32–36)
MCV RBC AUTO: 87.8 FL — SIGNIFICANT CHANGE UP (ref 80–100)
MONOCYTES # BLD AUTO: 1 K/UL — HIGH (ref 0–0.9)
MONOCYTES NFR BLD AUTO: 12 % — SIGNIFICANT CHANGE UP (ref 2–14)
NEUTROPHILS # BLD AUTO: 3.7 K/UL — SIGNIFICANT CHANGE UP (ref 1.8–7.4)
NEUTROPHILS NFR BLD AUTO: 79 % — HIGH (ref 43–77)
PLAT MORPH BLD: NORMAL — SIGNIFICANT CHANGE UP
PLATELET # BLD AUTO: 89 K/UL — LOW (ref 150–400)
POTASSIUM SERPL-MCNC: 5.2 MMOL/L — SIGNIFICANT CHANGE UP (ref 3.5–5.3)
POTASSIUM SERPL-SCNC: 5.2 MMOL/L — SIGNIFICANT CHANGE UP (ref 3.5–5.3)
PROT SERPL-MCNC: 7 G/DL — SIGNIFICANT CHANGE UP (ref 6–8.3)
RBC # BLD: 3.36 M/UL — LOW (ref 4.2–5.8)
RBC # FLD: 14.9 % — HIGH (ref 10.3–14.5)
RBC BLD AUTO: SIGNIFICANT CHANGE UP
SODIUM SERPL-SCNC: 128 MMOL/L — LOW (ref 135–145)
WBC # BLD: 5.2 K/UL — SIGNIFICANT CHANGE UP (ref 3.8–10.5)
WBC # FLD AUTO: 5.2 K/UL — SIGNIFICANT CHANGE UP (ref 3.8–10.5)

## 2017-12-18 PROCEDURE — 99215 OFFICE O/P EST HI 40 MIN: CPT

## 2017-12-18 RX ORDER — FEXOFENADINE HCL 60 MG/1
60 TABLET, FILM COATED ORAL DAILY
Qty: 30 | Refills: 0 | Status: ACTIVE | COMMUNITY
Start: 2017-12-18 | End: 1900-01-01

## 2017-12-19 LAB
BACTERIA # UR AUTO: ABNORMAL
COD CRY URNS QL: ABNORMAL
COMMENT - URINE: SIGNIFICANT CHANGE UP
EPI CELLS # UR: 2 /HPF — SIGNIFICANT CHANGE UP (ref 0–5)
HYALINE CASTS # UR AUTO: 0 /LPF — SIGNIFICANT CHANGE UP (ref 0–7)
RBC CASTS # UR COMP ASSIST: 0 /HPF — SIGNIFICANT CHANGE UP (ref 0–4)
WBC UR QL: 4 /HPF — SIGNIFICANT CHANGE UP (ref 0–5)

## 2017-12-20 ENCOUNTER — APPOINTMENT (OUTPATIENT)
Dept: CT IMAGING | Facility: CLINIC | Age: 47
End: 2017-12-20
Payer: MEDICAID

## 2017-12-20 ENCOUNTER — OUTPATIENT (OUTPATIENT)
Dept: OUTPATIENT SERVICES | Facility: HOSPITAL | Age: 47
LOS: 1 days | End: 2017-12-20
Payer: MEDICAID

## 2017-12-20 ENCOUNTER — RESULT REVIEW (OUTPATIENT)
Age: 47
End: 2017-12-20

## 2017-12-20 ENCOUNTER — APPOINTMENT (OUTPATIENT)
Dept: MRI IMAGING | Facility: CLINIC | Age: 47
End: 2017-12-20
Payer: MEDICAID

## 2017-12-20 ENCOUNTER — APPOINTMENT (OUTPATIENT)
Dept: HEMATOLOGY ONCOLOGY | Facility: CLINIC | Age: 47
End: 2017-12-20

## 2017-12-20 DIAGNOSIS — J90 PLEURAL EFFUSION, NOT ELSEWHERE CLASSIFIED: Chronic | ICD-10-CM

## 2017-12-20 DIAGNOSIS — C79.31 SECONDARY MALIGNANT NEOPLASM OF BRAIN: ICD-10-CM

## 2017-12-20 DIAGNOSIS — C78.00 SECONDARY MALIGNANT NEOPLASM OF UNSPECIFIED LUNG: ICD-10-CM

## 2017-12-20 LAB
EOSINOPHIL # BLD AUTO: 0.4 K/UL — SIGNIFICANT CHANGE UP (ref 0–0.5)
EOSINOPHIL NFR BLD AUTO: 4 % — SIGNIFICANT CHANGE UP (ref 0–6)
HCT VFR BLD CALC: 29.8 % — LOW (ref 39–50)
HGB BLD-MCNC: 10.4 G/DL — LOW (ref 13–17)
LYMPHOCYTES # BLD AUTO: 0.6 K/UL — LOW (ref 1–3.3)
LYMPHOCYTES # BLD AUTO: 22 % — SIGNIFICANT CHANGE UP (ref 13–44)
MCHC RBC-ENTMCNC: 30.6 PG — SIGNIFICANT CHANGE UP (ref 27–34)
MCHC RBC-ENTMCNC: 35.1 G/DL — SIGNIFICANT CHANGE UP (ref 32–36)
MCV RBC AUTO: 87.2 FL — SIGNIFICANT CHANGE UP (ref 80–100)
MONOCYTES # BLD AUTO: 0.9 K/UL — SIGNIFICANT CHANGE UP (ref 0–0.9)
MONOCYTES NFR BLD AUTO: 10 % — SIGNIFICANT CHANGE UP (ref 2–14)
NEUTROPHILS # BLD AUTO: 3.5 K/UL — SIGNIFICANT CHANGE UP (ref 1.8–7.4)
NEUTROPHILS NFR BLD AUTO: 64 % — SIGNIFICANT CHANGE UP (ref 43–77)
PLAT MORPH BLD: NORMAL — SIGNIFICANT CHANGE UP
PLATELET # BLD AUTO: 82 K/UL — LOW (ref 150–400)
RBC # BLD: 3.41 M/UL — LOW (ref 4.2–5.8)
RBC # FLD: 15.2 % — HIGH (ref 10.3–14.5)
RBC BLD AUTO: SIGNIFICANT CHANGE UP
WBC # BLD: 5.4 K/UL — SIGNIFICANT CHANGE UP (ref 3.8–10.5)
WBC # FLD AUTO: 5.4 K/UL — SIGNIFICANT CHANGE UP (ref 3.8–10.5)

## 2017-12-20 PROCEDURE — 70553 MRI BRAIN STEM W/O & W/DYE: CPT | Mod: 26

## 2017-12-20 PROCEDURE — 70553 MRI BRAIN STEM W/O & W/DYE: CPT

## 2017-12-26 ENCOUNTER — FORM ENCOUNTER (OUTPATIENT)
Age: 47
End: 2017-12-26

## 2017-12-26 RX ORDER — FOLIC ACID 1 MG/1
1 TABLET ORAL DAILY
Qty: 30 | Refills: 5 | Status: DISCONTINUED | COMMUNITY
Start: 2017-10-19 | End: 2017-12-26

## 2017-12-26 RX ORDER — FOLIC ACID 1 MG/1
1 TABLET ORAL DAILY
Qty: 30 | Refills: 5 | Status: DISCONTINUED | COMMUNITY
Start: 2017-10-17 | End: 2017-12-26

## 2017-12-27 ENCOUNTER — APPOINTMENT (OUTPATIENT)
Dept: NUCLEAR MEDICINE | Facility: IMAGING CENTER | Age: 47
End: 2017-12-27
Payer: MEDICAID

## 2017-12-27 ENCOUNTER — OUTPATIENT (OUTPATIENT)
Dept: OUTPATIENT SERVICES | Facility: HOSPITAL | Age: 47
LOS: 1 days | End: 2017-12-27
Payer: MEDICAID

## 2017-12-27 DIAGNOSIS — J90 PLEURAL EFFUSION, NOT ELSEWHERE CLASSIFIED: Chronic | ICD-10-CM

## 2017-12-27 DIAGNOSIS — C78.00 SECONDARY MALIGNANT NEOPLASM OF UNSPECIFIED LUNG: ICD-10-CM

## 2017-12-27 PROCEDURE — A9552: CPT

## 2017-12-27 PROCEDURE — 78815 PET IMAGE W/CT SKULL-THIGH: CPT

## 2017-12-27 PROCEDURE — 78815 PET IMAGE W/CT SKULL-THIGH: CPT | Mod: 26,PS

## 2017-12-28 ENCOUNTER — RESULT REVIEW (OUTPATIENT)
Age: 47
End: 2017-12-28

## 2017-12-28 ENCOUNTER — APPOINTMENT (OUTPATIENT)
Dept: INFUSION THERAPY | Facility: HOSPITAL | Age: 47
End: 2017-12-28

## 2017-12-28 ENCOUNTER — LABORATORY RESULT (OUTPATIENT)
Age: 47
End: 2017-12-28

## 2017-12-28 ENCOUNTER — APPOINTMENT (OUTPATIENT)
Dept: HEMATOLOGY ONCOLOGY | Facility: CLINIC | Age: 47
End: 2017-12-28

## 2017-12-28 LAB
HCT VFR BLD CALC: 30.5 % — LOW (ref 39–50)
HGB BLD-MCNC: 10.7 G/DL — LOW (ref 13–17)
MCHC RBC-ENTMCNC: 31.2 PG — SIGNIFICANT CHANGE UP (ref 27–34)
MCHC RBC-ENTMCNC: 35 G/DL — SIGNIFICANT CHANGE UP (ref 32–36)
MCV RBC AUTO: 89.3 FL — SIGNIFICANT CHANGE UP (ref 80–100)
PLATELET # BLD AUTO: 377 K/UL — SIGNIFICANT CHANGE UP (ref 150–400)
RBC # BLD: 3.42 M/UL — LOW (ref 4.2–5.8)
RBC # FLD: 15.9 % — HIGH (ref 10.3–14.5)
WBC # BLD: 8.4 K/UL — SIGNIFICANT CHANGE UP (ref 3.8–10.5)
WBC # FLD AUTO: 8.4 K/UL — SIGNIFICANT CHANGE UP (ref 3.8–10.5)

## 2017-12-29 ENCOUNTER — OTHER (OUTPATIENT)
Age: 47
End: 2017-12-29

## 2017-12-29 ENCOUNTER — OUTPATIENT (OUTPATIENT)
Dept: OUTPATIENT SERVICES | Facility: HOSPITAL | Age: 47
LOS: 1 days | Discharge: HOME | End: 2017-12-29

## 2017-12-29 DIAGNOSIS — C34.90 MALIGNANT NEOPLASM OF UNSPECIFIED PART OF UNSPECIFIED BRONCHUS OR LUNG: ICD-10-CM

## 2017-12-29 DIAGNOSIS — J90 PLEURAL EFFUSION, NOT ELSEWHERE CLASSIFIED: Chronic | ICD-10-CM

## 2018-01-02 ENCOUNTER — RX RENEWAL (OUTPATIENT)
Age: 48
End: 2018-01-02

## 2018-01-02 RX ORDER — DEXAMETHASONE 4 MG/1
4 TABLET ORAL DAILY
Qty: 100 | Refills: 2 | Status: ACTIVE | COMMUNITY
Start: 2017-12-26 | End: 1900-01-01

## 2018-01-03 RX ORDER — OXYCODONE HYDROCHLORIDE 30 MG/1
30 TABLET, FILM COATED, EXTENDED RELEASE ORAL
Qty: 90 | Refills: 0 | Status: DISCONTINUED | COMMUNITY
Start: 2017-10-19 | End: 2018-01-03

## 2018-01-03 RX ORDER — OXYCODONE HYDROCHLORIDE 40 MG/1
40 TABLET, FILM COATED, EXTENDED RELEASE ORAL
Qty: 90 | Refills: 0 | Status: DISCONTINUED | COMMUNITY
Start: 2017-11-30 | End: 2018-01-03

## 2018-01-08 ENCOUNTER — INPATIENT (INPATIENT)
Facility: HOSPITAL | Age: 48
LOS: 3 days | Discharge: HOME | DRG: 101 | End: 2018-01-12
Attending: HOSPITALIST | Admitting: HOSPITALIST
Payer: MEDICAID

## 2018-01-08 ENCOUNTER — RESULT REVIEW (OUTPATIENT)
Age: 48
End: 2018-01-08

## 2018-01-08 ENCOUNTER — APPOINTMENT (OUTPATIENT)
Dept: INFUSION THERAPY | Facility: HOSPITAL | Age: 48
End: 2018-01-08

## 2018-01-08 VITALS
OXYGEN SATURATION: 98 % | HEART RATE: 133 BPM | SYSTOLIC BLOOD PRESSURE: 141 MMHG | RESPIRATION RATE: 16 BRPM | DIASTOLIC BLOOD PRESSURE: 80 MMHG

## 2018-01-08 DIAGNOSIS — D72.825 BANDEMIA: ICD-10-CM

## 2018-01-08 DIAGNOSIS — E87.1 HYPO-OSMOLALITY AND HYPONATREMIA: ICD-10-CM

## 2018-01-08 DIAGNOSIS — G93.9 DISORDER OF BRAIN, UNSPECIFIED: ICD-10-CM

## 2018-01-08 DIAGNOSIS — R56.9 UNSPECIFIED CONVULSIONS: ICD-10-CM

## 2018-01-08 DIAGNOSIS — C34.90 MALIGNANT NEOPLASM OF UNSPECIFIED PART OF UNSPECIFIED BRONCHUS OR LUNG: ICD-10-CM

## 2018-01-08 DIAGNOSIS — J90 PLEURAL EFFUSION, NOT ELSEWHERE CLASSIFIED: Chronic | ICD-10-CM

## 2018-01-08 DIAGNOSIS — Z29.9 ENCOUNTER FOR PROPHYLACTIC MEASURES, UNSPECIFIED: ICD-10-CM

## 2018-01-08 LAB
ALBUMIN SERPL ELPH-MCNC: 2.8 G/DL — LOW (ref 3.3–5)
ALBUMIN SERPL ELPH-MCNC: 3.4 G/DL — SIGNIFICANT CHANGE UP (ref 3.3–5)
ALP SERPL-CCNC: 122 U/L — HIGH (ref 40–120)
ALP SERPL-CCNC: 129 U/L — HIGH (ref 40–120)
ALT FLD-CCNC: 21 U/L — SIGNIFICANT CHANGE UP (ref 10–45)
ALT FLD-CCNC: 25 U/L RC — SIGNIFICANT CHANGE UP (ref 10–45)
ANION GAP SERPL CALC-SCNC: 14 MMOL/L — SIGNIFICANT CHANGE UP (ref 5–17)
ANION GAP SERPL CALC-SCNC: 16 MMOL/L — SIGNIFICANT CHANGE UP (ref 5–17)
APPEARANCE UR: ABNORMAL
AST SERPL-CCNC: 25 U/L — SIGNIFICANT CHANGE UP (ref 10–40)
AST SERPL-CCNC: 28 U/L — SIGNIFICANT CHANGE UP (ref 10–40)
BACTERIA # UR AUTO: NEGATIVE — SIGNIFICANT CHANGE UP
BASOPHILS # BLD AUTO: 0 K/UL — SIGNIFICANT CHANGE UP (ref 0–0.2)
BILIRUB SERPL-MCNC: 0.2 MG/DL — SIGNIFICANT CHANGE UP (ref 0.2–1.2)
BILIRUB SERPL-MCNC: 0.3 MG/DL — SIGNIFICANT CHANGE UP (ref 0.2–1.2)
BILIRUB UR-MCNC: NEGATIVE — SIGNIFICANT CHANGE UP
BUN SERPL-MCNC: 12 MG/DL — SIGNIFICANT CHANGE UP (ref 7–23)
BUN SERPL-MCNC: 12 MG/DL — SIGNIFICANT CHANGE UP (ref 7–23)
CALCIUM SERPL-MCNC: 8.7 MG/DL — SIGNIFICANT CHANGE UP (ref 8.4–10.5)
CALCIUM SERPL-MCNC: 9.3 MG/DL — SIGNIFICANT CHANGE UP (ref 8.4–10.5)
CHLORIDE SERPL-SCNC: 87 MMOL/L — LOW (ref 96–108)
CHLORIDE SERPL-SCNC: 88 MMOL/L — LOW (ref 96–108)
CO2 SERPL-SCNC: 23 MMOL/L — SIGNIFICANT CHANGE UP (ref 22–31)
CO2 SERPL-SCNC: 25 MMOL/L — SIGNIFICANT CHANGE UP (ref 22–31)
COD CRY URNS QL: ABNORMAL
COLOR SPEC: YELLOW — SIGNIFICANT CHANGE UP
CREAT SERPL-MCNC: 0.66 MG/DL — SIGNIFICANT CHANGE UP (ref 0.5–1.3)
CREAT SERPL-MCNC: 0.66 MG/DL — SIGNIFICANT CHANGE UP (ref 0.5–1.3)
DIFF PNL FLD: NEGATIVE — SIGNIFICANT CHANGE UP
EOSINOPHIL # BLD AUTO: 0.2 K/UL — SIGNIFICANT CHANGE UP (ref 0–0.5)
EOSINOPHIL NFR BLD AUTO: 1 % — SIGNIFICANT CHANGE UP (ref 0–6)
EPI CELLS # UR: 2 /HPF — SIGNIFICANT CHANGE UP (ref 0–5)
GLUCOSE SERPL-MCNC: 249 MG/DL — HIGH (ref 70–99)
GLUCOSE SERPL-MCNC: 89 MG/DL — SIGNIFICANT CHANGE UP (ref 70–99)
GLUCOSE UR QL: NEGATIVE MG/DL — SIGNIFICANT CHANGE UP
HCT VFR BLD CALC: 30.5 % — LOW (ref 39–50)
HCT VFR BLD CALC: 31.1 % — LOW (ref 39–50)
HGB BLD-MCNC: 10.4 G/DL — LOW (ref 13–17)
HGB BLD-MCNC: 10.8 G/DL — LOW (ref 13–17)
HYALINE CASTS # UR AUTO: 3 /LPF — SIGNIFICANT CHANGE UP (ref 0–7)
KETONES UR-MCNC: NEGATIVE — SIGNIFICANT CHANGE UP
LEUKOCYTE ESTERASE UR-ACNC: NEGATIVE — SIGNIFICANT CHANGE UP
LYMPHOCYTES # BLD AUTO: 0.6 K/UL — LOW (ref 1–3.3)
LYMPHOCYTES # BLD AUTO: 1 % — LOW (ref 13–44)
MCHC RBC-ENTMCNC: 30.6 PG — SIGNIFICANT CHANGE UP (ref 27–34)
MCHC RBC-ENTMCNC: 32.1 PG — SIGNIFICANT CHANGE UP (ref 27–34)
MCHC RBC-ENTMCNC: 34.3 G/DL — SIGNIFICANT CHANGE UP (ref 32–36)
MCHC RBC-ENTMCNC: 34.8 GM/DL — SIGNIFICANT CHANGE UP (ref 32–36)
MCV RBC AUTO: 89.3 FL — SIGNIFICANT CHANGE UP (ref 80–100)
MCV RBC AUTO: 92.3 FL — SIGNIFICANT CHANGE UP (ref 80–100)
MONOCYTES # BLD AUTO: 0.7 K/UL — SIGNIFICANT CHANGE UP (ref 0–0.9)
MONOCYTES NFR BLD AUTO: 5 % — SIGNIFICANT CHANGE UP (ref 2–14)
NEUTROPHILS # BLD AUTO: 22.1 K/UL — HIGH (ref 1.8–7.4)
NEUTROPHILS NFR BLD AUTO: 75 % — SIGNIFICANT CHANGE UP (ref 43–77)
NITRITE UR-MCNC: NEGATIVE — SIGNIFICANT CHANGE UP
PH UR: 6 — SIGNIFICANT CHANGE UP (ref 5–8)
PLATELET # BLD AUTO: 351 K/UL — SIGNIFICANT CHANGE UP (ref 150–400)
PLATELET # BLD AUTO: 369 K/UL — SIGNIFICANT CHANGE UP (ref 150–400)
POTASSIUM SERPL-MCNC: 3.8 MMOL/L — SIGNIFICANT CHANGE UP (ref 3.5–5.3)
POTASSIUM SERPL-MCNC: 4.5 MMOL/L — SIGNIFICANT CHANGE UP (ref 3.5–5.3)
POTASSIUM SERPL-SCNC: 3.8 MMOL/L — SIGNIFICANT CHANGE UP (ref 3.5–5.3)
POTASSIUM SERPL-SCNC: 4.5 MMOL/L — SIGNIFICANT CHANGE UP (ref 3.5–5.3)
PROT SERPL-MCNC: 6.8 G/DL — SIGNIFICANT CHANGE UP (ref 6–8.3)
PROT SERPL-MCNC: 7.1 G/DL — SIGNIFICANT CHANGE UP (ref 6–8.3)
PROT UR-MCNC: NEGATIVE MG/DL — SIGNIFICANT CHANGE UP
RBC # BLD: 3.37 M/UL — LOW (ref 4.2–5.8)
RBC # BLD: 3.41 M/UL — LOW (ref 4.2–5.8)
RBC # FLD: 15.9 % — HIGH (ref 10.3–14.5)
RBC # FLD: 16 % — HIGH (ref 10.3–14.5)
RBC CASTS # UR COMP ASSIST: 2 /HPF — SIGNIFICANT CHANGE UP (ref 0–4)
SODIUM SERPL-SCNC: 125 MMOL/L — LOW (ref 135–145)
SODIUM SERPL-SCNC: 128 MMOL/L — LOW (ref 135–145)
SP GR SPEC: 1.02 — SIGNIFICANT CHANGE UP (ref 1.01–1.02)
UROBILINOGEN FLD QL: NEGATIVE MG/DL — SIGNIFICANT CHANGE UP
WBC # BLD: 22.2 K/UL — HIGH (ref 3.8–10.5)
WBC # BLD: 23.6 K/UL — HIGH (ref 3.8–10.5)
WBC # FLD AUTO: 22.2 K/UL — HIGH (ref 3.8–10.5)
WBC # FLD AUTO: 23.6 K/UL — HIGH (ref 3.8–10.5)
WBC UR QL: 2 /HPF — SIGNIFICANT CHANGE UP (ref 0–5)

## 2018-01-08 PROCEDURE — 70450 CT HEAD/BRAIN W/O DYE: CPT | Mod: 26

## 2018-01-08 PROCEDURE — 93010 ELECTROCARDIOGRAM REPORT: CPT

## 2018-01-08 PROCEDURE — 99285 EMERGENCY DEPT VISIT HI MDM: CPT | Mod: 25

## 2018-01-08 PROCEDURE — 99223 1ST HOSP IP/OBS HIGH 75: CPT

## 2018-01-08 PROCEDURE — 71045 X-RAY EXAM CHEST 1 VIEW: CPT | Mod: 26

## 2018-01-08 RX ORDER — DOCUSATE SODIUM 100 MG
100 CAPSULE ORAL THREE TIMES A DAY
Qty: 0 | Refills: 0 | Status: DISCONTINUED | OUTPATIENT
Start: 2018-01-08 | End: 2018-01-12

## 2018-01-08 RX ORDER — VANCOMYCIN HCL 1 G
1000 VIAL (EA) INTRAVENOUS EVERY 12 HOURS
Qty: 0 | Refills: 0 | Status: DISCONTINUED | OUTPATIENT
Start: 2018-01-08 | End: 2018-01-09

## 2018-01-08 RX ORDER — SENNA PLUS 8.6 MG/1
2 TABLET ORAL AT BEDTIME
Qty: 0 | Refills: 0 | Status: DISCONTINUED | OUTPATIENT
Start: 2018-01-08 | End: 2018-01-12

## 2018-01-08 RX ORDER — GABAPENTIN 400 MG/1
100 CAPSULE ORAL EVERY 8 HOURS
Qty: 0 | Refills: 0 | Status: DISCONTINUED | OUTPATIENT
Start: 2018-01-08 | End: 2018-01-09

## 2018-01-08 RX ORDER — CEFEPIME 1 G/1
1000 INJECTION, POWDER, FOR SOLUTION INTRAMUSCULAR; INTRAVENOUS EVERY 12 HOURS
Qty: 0 | Refills: 0 | Status: DISCONTINUED | OUTPATIENT
Start: 2018-01-08 | End: 2018-01-08

## 2018-01-08 RX ORDER — SODIUM CHLORIDE 9 MG/ML
1000 INJECTION INTRAMUSCULAR; INTRAVENOUS; SUBCUTANEOUS ONCE
Qty: 0 | Refills: 0 | Status: COMPLETED | OUTPATIENT
Start: 2018-01-08 | End: 2018-01-08

## 2018-01-08 RX ORDER — OXYCODONE HYDROCHLORIDE 5 MG/1
5 TABLET ORAL
Qty: 0 | Refills: 0 | Status: DISCONTINUED | OUTPATIENT
Start: 2018-01-08 | End: 2018-01-09

## 2018-01-08 RX ORDER — CEFEPIME 1 G/1
1000 INJECTION, POWDER, FOR SOLUTION INTRAMUSCULAR; INTRAVENOUS EVERY 12 HOURS
Qty: 0 | Refills: 0 | Status: DISCONTINUED | OUTPATIENT
Start: 2018-01-08 | End: 2018-01-09

## 2018-01-08 RX ORDER — ENOXAPARIN SODIUM 100 MG/ML
40 INJECTION SUBCUTANEOUS DAILY
Qty: 0 | Refills: 0 | Status: DISCONTINUED | OUTPATIENT
Start: 2018-01-08 | End: 2018-01-12

## 2018-01-08 RX ORDER — ACETAMINOPHEN 500 MG
650 TABLET ORAL EVERY 6 HOURS
Qty: 0 | Refills: 0 | Status: DISCONTINUED | OUTPATIENT
Start: 2018-01-08 | End: 2018-01-12

## 2018-01-08 RX ORDER — VANCOMYCIN HCL 1 G
1000 VIAL (EA) INTRAVENOUS ONCE
Qty: 0 | Refills: 0 | Status: COMPLETED | OUTPATIENT
Start: 2018-01-08 | End: 2018-01-08

## 2018-01-08 RX ORDER — OXYCODONE HYDROCHLORIDE 5 MG/1
40 TABLET ORAL EVERY 12 HOURS
Qty: 0 | Refills: 0 | Status: DISCONTINUED | OUTPATIENT
Start: 2018-01-08 | End: 2018-01-09

## 2018-01-08 RX ORDER — LEVETIRACETAM 250 MG/1
500 TABLET, FILM COATED ORAL EVERY 12 HOURS
Qty: 0 | Refills: 0 | Status: DISCONTINUED | OUTPATIENT
Start: 2018-01-08 | End: 2018-01-10

## 2018-01-08 RX ORDER — ONDANSETRON 8 MG/1
4 TABLET, FILM COATED ORAL EVERY 8 HOURS
Qty: 0 | Refills: 0 | Status: DISCONTINUED | OUTPATIENT
Start: 2018-01-08 | End: 2018-01-12

## 2018-01-08 RX ADMIN — CEFEPIME 100 MILLIGRAM(S): 1 INJECTION, POWDER, FOR SOLUTION INTRAMUSCULAR; INTRAVENOUS at 15:47

## 2018-01-08 RX ADMIN — SENNA PLUS 2 TABLET(S): 8.6 TABLET ORAL at 23:15

## 2018-01-08 RX ADMIN — Medication 250 MILLIGRAM(S): at 16:53

## 2018-01-08 RX ADMIN — SODIUM CHLORIDE 1000 MILLILITER(S): 9 INJECTION INTRAMUSCULAR; INTRAVENOUS; SUBCUTANEOUS at 13:58

## 2018-01-08 RX ADMIN — SODIUM CHLORIDE 1000 MILLILITER(S): 9 INJECTION INTRAMUSCULAR; INTRAVENOUS; SUBCUTANEOUS at 14:32

## 2018-01-08 RX ADMIN — LEVETIRACETAM 400 MILLIGRAM(S): 250 TABLET, FILM COATED ORAL at 20:30

## 2018-01-08 RX ADMIN — Medication 100 MILLIGRAM(S): at 23:15

## 2018-01-08 NOTE — H&P ADULT - PROBLEM SELECTOR PLAN 3
unclear etiology  possible underlying sepsis  UA negative for UTI  Will check CXR  c/w broad spectrum IV abx coverage in light of immunocompromised setting   ( cefepime , vancomycin)  f/up blood cx

## 2018-01-08 NOTE — H&P ADULT - NSHPLABSRESULTS_GEN_ALL_CORE
Labs reviewed :                       10.8   23.6  )-----------( 351      ( 08 Jan 2018 13:34 )             31.1   01-08    125<L>  |  88<L>  |  12  ----------------------------<  249<H>  3.8   |  23  |  0.66    Ca    8.7      08 Jan 2018 13:34  Phos  2.0     01-08  Mg     1.7     01-08    TPro  6.8  /  Alb  2.8<L>  /  TBili  0.3  /  DBili  x   /  AST  28  /  ALT  25  /  AlkPhos  129<H>  01-08      CT Brain personally reviewed : No evidence for acute infarct or acute hemorrhage.  Previously seen metastatic lesions.    EKG personally reviewed : Sinus tachycardia , hr 116 bpm, no acute ischemic changes

## 2018-01-08 NOTE — ED PROVIDER NOTE - PHYSICAL EXAMINATION
Eun Griffin M.D.:   patient awake alert seen lying on stretcher NAD .   LUNGS CTAB no wheeze no crackle.   CARD tachycardic no m/r/g.    Abdomen soft NT ND no rebound no guarding no CVA tenderness.   EXT WWP no edema no calf tenderness CV 2+DP/PT bilaterally.   neuro A&Ox3 CN 2-12 intact gross motor and sensory intact in bl UE and LE .    skin warm and dry no rash  HEENT: moist mucous membranes, PERRL, EOMI

## 2018-01-08 NOTE — ED ADULT NURSE REASSESSMENT NOTE - NS ED NURSE REASSESS COMMENT FT1
patient resting comfortably in bed in no acute distress. patient c/o left shoulder pain, heat packs given. waiting for bed,

## 2018-01-08 NOTE — H&P ADULT - PROBLEM SELECTOR PLAN 1
possibly 2/2 brain mets vs an adverse reaction to chemotherapy   Neurology reccs appreciated  c/w Keppra per Neuro reccs  check EEG  Fall precautions possibly 2/2 brain mets vs an adverse reaction to Taxotere infusion  Neurology reccs appreciated  c/w Keppra per Neuro reccs  check EEG  Fall precautions

## 2018-01-08 NOTE — ED PROVIDER NOTE - PROGRESS NOTE DETAILS
hr dec from 140 to 116 with fluids -- wbc 25 w bandemia - no fever - pt denies any ha no neck pain - on chemo will cover empirically - pending onc recs

## 2018-01-08 NOTE — CONSULT NOTE ADULT - SUBJECTIVE AND OBJECTIVE BOX
Neurology consult    TREV [id number]     Patient is a 47y old  Male who presents with a chief complaint of seizures    HPI: 46 yo male with PMHx of NSCLC s/p VATS, mets to brain, currently on chemo who presents s/p seizure-like activity at chemotherapy session. Patient endorses LOC, +urinary incontinence. Patient denies any aura. Feels at baseline currently. This is patient's second seizure-like activity in the month in the context of chemotherapeutics. Patient attributes to a "bad reaction" to chemotherapy changes and was given benadryl, solumedrol, pepcid, ativan. Denies any fevers, n/v/d, chills recently. Denies any weakness or neurologic deficits. Oncologist is Kala Hernandez ([phone]).     REVIEW OF SYSTEMS:    Constitutional: No fever, chills, fatigue, weakness  Eyes: no eye pain, visual disturbances, or discharge  ENT:  No difficulty hearing, tinnitus, vertigo; No sinus or throat pain  Neck: No pain or stiffness  Respiratory: No cough, dyspnea, wheezing   Cardiovascular: No chest pain, palpitations,   Gastrointestinal: No abdominal or epigastric pain. No nausea, vomiting  No diarrhea or constipation.   Genitourinary: No dysuria, frequency, hematuria or incontinence  Neurological: No headaches, lightheadedness, vertigo, numbness or tremors  Psychiatric: No depression, anxiety, mood swings or difficulty sleeping  Musculoskeletal: Pain with L arm motion 2/2 VATS   Skin: No itching, burning, rashes or lesions   Lymph Nodes: No enlarged glands  Endocrine: No heat or cold intolerance; No hair loss, No h/o diabetes or thyroid dysfunction  Allergy and Immunologic: No hives or eczema    MEDICATIONS    cefepime  IVPB 1000 milliGRAM(s) IV Intermittent every 12 hours  levETIRAcetam  IVPB 500 milliGRAM(s) IV Intermittent every 12 hours  vancomycin  IVPB 1000 milliGRAM(s) IV Intermittent once      PMH: Non-small cell lung cancer (NSCLC)  No pertinent past medical history       PSH: Pleural effusion  No significant past surgical history      Family history: No history of dementia, strokes, or seizures   FAMILY HISTORY:  Family history of lung cancer (Grandparent)  Family history of colon cancer (Sibling)      SOCIAL HISTORY:  No history of tobacco or alcohol use     Allergies    ampicillin (Rash)    Intolerances    Vital Signs Last 24 Hrs  T(C): 36.9 (2018 13:13), Max: 36.9 (2018 13:13)  T(F): 98.5 (2018 13:13), Max: 98.5 (2018 13:13)  HR: 113 (2018 14:36) (98 - 141)  BP: 134/85 (2018 14:36) (125/73 - 141/87)  BP(mean): --  RR: 18 (2018 14:36) (16 - 18)  SpO2: 98% (2018 14:36) (97% - 98%)      On Neurological Examination:    Mental Status - Patient is alert, awake, oriented X3. fluent, names, no dysarthria no aphasia Follows commands well and able to answer questions appropriately. Mood and affect  normal    Cranial Nerves - PERRL, EOMI, VFF, V1-V3 intact, no gross facial asymmetry, tongue/uvula midline    Motor Exam - 5/5 throughout    nml bulk/tone    Sensory    Intact to light touch and pinprick bilaterally    Coord: intention tremor during FNF bilaterally (L>R)    Gait -  normal      Reflexes:          2+ throughout                                               GENERAL Exam:     Nontoxic , No Acute Distress   	  HEENT:  normocephalic, atraumatic  		  LUNGS:	Clear bilaterally  No Wheeze    	  HEART:	 Normal S1S2   No murmur RRR        	  GI/ ABDOMEN:  Soft  Non tender    EXTREMITIES:   No Edema  No Clubbing  No Cyanosis No Edema    MUSCULOSKELETAL: Normal Range of Motion  	   SKIN:      Normal   No Ecchymosis        LABS:  CBC Full  -  ( 2018 13:34 )  WBC Count : 23.6 K/uL  Hemoglobin : 10.8 g/dL  Hematocrit : 31.1 %  Platelet Count - Automated : 351 K/uL  Mean Cell Volume : 92.3 fl  Mean Cell Hemoglobin : 32.1 pg  Mean Cell Hemoglobin Concentration : 34.8 gm/dL  Auto Neutrophil # : 22.1 K/uL  Auto Lymphocyte # : 0.6 K/uL  Auto Monocyte # : 0.7 K/uL  Auto Eosinophil # : 0.2 K/uL  Auto Basophil # : 0.0 K/uL  Auto Neutrophil % : 75.0 %  Auto Lymphocyte % : 1.0 %  Auto Monocyte % : 5.0 %  Auto Eosinophil % : 1.0 %  Auto Basophil % : x    Urinalysis Basic - ( 2018 13:01 )    Color: Yellow / Appearance: Slightly Turbid / S.020 / pH: x  Gluc: x / Ketone: Negative  / Bili: Negative / Urobili: Negative mg/dL   Blood: x / Protein: Negative mg/dL / Nitrite: Negative   Leuk Esterase: Negative / RBC: x / WBC x   Sq Epi: x / Non Sq Epi: x / Bacteria: x          125<L>  |  88<L>  |  12  ----------------------------<  249<H>  3.8   |  23  |  0.66    Ca    8.7      2018 13:34  Phos  2.0       Mg     1.7         TPro  6.8  /  Alb  2.8<L>  /  TBili  0.3  /  DBili  x   /  AST  28  /  ALT  25  /  AlkPhos  129<H>      LIVER FUNCTIONS - ( 2018 13:34 )  Alb: 2.8 g/dL / Pro: 6.8 g/dL / ALK PHOS: 129 U/L / ALT: 25 U/L RC / AST: 28 U/L / GGT: x           RADIOLOGY    CTH:   CT Head No Cont (18 @ 14:25) >  NTERPRETATION:  CLINICAL INFORMATION: Seizure. History of metastatic   non-small cell left lung adenocarcinoma status post chemotherapy and   radiation therapy to brain metastases. Evaluate extent of disease.   Fainted.    TECHNIQUE:  Serial axial images were obtained from the skull base to the   vertex without intravenous contrast. Coronal and sagittal reformatted   images were obtained.    COMPARISON: MRI brainfrom 2017.     FINDINGS:     Previously seen metastatic lesions on MRI from 2017 are not well   evaluated on this noncontrast imaging study. There is no evidence of any   loss of gray-white matter differentiation, or gross cerebraledema.  No evidence of any acute territorial infarct or acute hemorrhage.  There is no hydrocephalus, or midline shift.    The ventricles, and sulci are unremarkable.  The basal cisterns are   patent.    The maxillary sinuses and mastoid air cells are clear.  Mild mucosal   thickening of bilateral ethmoid air cells. There is no displaced   calvarial fracture.     IMPRESSION:     No evidence for acute infarct or acute hemorrhage.    Previously seen metastatic lesions from MRI on 2017 are notwell   visualized on this noncontrast imaging study. Consider follow-up brain   MRI with without contrast for further workup if warranted. Neurology consult    TREV [id number]     Patient is a 47y old  Male who presents with a chief complaint of seizures    HPI: 46 yo male with PMHx of NSCLC s/p VATS, mets to brain, currently on chemo who presents s/p seizure-like activity at chemotherapy session. Patient endorses LOC, +urinary incontinence. Patient denies any aura. Feels at baseline currently. This is patient's second seizure-like activity in the month in the context of chemotherapeutics. Patient attributes to a "bad reaction" to chemotherapy changes and was given benadryl, solumedrol, pepcid, ativan. Denies any fevers, n/v/d, chills recently. Denies any weakness or neurologic deficits. Oncologist is Kala Hernandez ([phone]).     REVIEW OF SYSTEMS:    see HPI    MEDICATIONS    cefepime  IVPB 1000 milliGRAM(s) IV Intermittent every 12 hours  levETIRAcetam  IVPB 500 milliGRAM(s) IV Intermittent every 12 hours  vancomycin  IVPB 1000 milliGRAM(s) IV Intermittent once      PMH: Non-small cell lung cancer (NSCLC)  No pertinent past medical history       PSH: Pleural effusion  No significant past surgical history      Family history: No history of dementia, strokes, or seizures   FAMILY HISTORY:  Family history of lung cancer (Grandparent)  Family history of colon cancer (Sibling)      SOCIAL HISTORY:  No history of tobacco or alcohol use     Allergies    ampicillin (Rash)    Intolerances    Vital Signs Last 24 Hrs  T(C): 36.9 (2018 13:13), Max: 36.9 (2018 13:13)  T(F): 98.5 (2018 13:13), Max: 98.5 (2018 13:13)  HR: 113 (2018 14:36) (98 - 141)  BP: 134/85 (2018 14:36) (125/73 - 141/87)  BP(mean): --  RR: 18 (2018 14:36) (16 - 18)  SpO2: 98% (2018 14:36) (97% - 98%)      On Neurological Examination:    Mental Status - Patient is alert, awake, oriented X3. fluent, names, no dysarthria no aphasia Follows commands well and able to answer questions appropriately. Mood and affect  normal    Cranial Nerves - PERRL, EOMI, VFF, V1-V3 intact, no gross facial asymmetry, tongue/uvula midline    Motor Exam - 5/5 throughout    nml bulk/tone    Sensory    Intact to light touch and pinprick bilaterally    Coord: intention tremor during FNF bilaterally (L>R)    Gait -  normal      Reflexes:          2+ throughout                                               GENERAL Exam:     Nontoxic , No Acute Distress   	  HEENT:  normocephalic, atraumatic  		  LUNGS:	Clear bilaterally  No Wheeze    	  HEART:	 Normal S1S2   No murmur RRR        	  GI/ ABDOMEN:  Soft  Non tender    EXTREMITIES:   No Edema  No Clubbing  No Cyanosis No Edema    MUSCULOSKELETAL: Normal Range of Motion  	   SKIN:      Normal   No Ecchymosis        LABS:  CBC Full  -  ( 2018 13:34 )  WBC Count : 23.6 K/uL  Hemoglobin : 10.8 g/dL  Hematocrit : 31.1 %  Platelet Count - Automated : 351 K/uL  Mean Cell Volume : 92.3 fl  Mean Cell Hemoglobin : 32.1 pg  Mean Cell Hemoglobin Concentration : 34.8 gm/dL  Auto Neutrophil # : 22.1 K/uL  Auto Lymphocyte # : 0.6 K/uL  Auto Monocyte # : 0.7 K/uL  Auto Eosinophil # : 0.2 K/uL  Auto Basophil # : 0.0 K/uL  Auto Neutrophil % : 75.0 %  Auto Lymphocyte % : 1.0 %  Auto Monocyte % : 5.0 %  Auto Eosinophil % : 1.0 %  Auto Basophil % : x    Urinalysis Basic - ( 2018 13:01 )    Color: Yellow / Appearance: Slightly Turbid / S.020 / pH: x  Gluc: x / Ketone: Negative  / Bili: Negative / Urobili: Negative mg/dL   Blood: x / Protein: Negative mg/dL / Nitrite: Negative   Leuk Esterase: Negative / RBC: x / WBC x   Sq Epi: x / Non Sq Epi: x / Bacteria: x          125<L>  |  88<L>  |  12  ----------------------------<  249<H>  3.8   |  23  |  0.66    Ca    8.7      2018 13:34  Phos  2.0     -08  Mg     1.7     -08    TPro  6.8  /  Alb  2.8<L>  /  TBili  0.3  /  DBili  x   /  AST  28  /  ALT  25  /  AlkPhos  129<H>      LIVER FUNCTIONS - ( 2018 13:34 )  Alb: 2.8 g/dL / Pro: 6.8 g/dL / ALK PHOS: 129 U/L / ALT: 25 U/L RC / AST: 28 U/L / GGT: x           RADIOLOGY    CTH:   CT Head No Cont (.18 @ 14:25) >  NTERPRETATION:  CLINICAL INFORMATION: Seizure. History of metastatic   non-small cell left lung adenocarcinoma status post chemotherapy and   radiation therapy to brain metastases. Evaluate extent of disease.   Fainted.    TECHNIQUE:  Serial axial images were obtained from the skull base to the   vertex without intravenous contrast. Coronal and sagittal reformatted   images were obtained.    COMPARISON: MRI brainfrom 2017.     FINDINGS:     Previously seen metastatic lesions on MRI from 2017 are not well   evaluated on this noncontrast imaging study. There is no evidence of any   loss of gray-white matter differentiation, or gross cerebraledema.  No evidence of any acute territorial infarct or acute hemorrhage.  There is no hydrocephalus, or midline shift.    The ventricles, and sulci are unremarkable.  The basal cisterns are   patent.    The maxillary sinuses and mastoid air cells are clear.  Mild mucosal   thickening of bilateral ethmoid air cells. There is no displaced   calvarial fracture.     IMPRESSION:     No evidence for acute infarct or acute hemorrhage.    Previously seen metastatic lesions from MRI on 2017 are notwell   visualized on this noncontrast imaging study. Consider follow-up brain   MRI with without contrast for further workup if warranted.

## 2018-01-08 NOTE — H&P ADULT - NSHPOUTPATIENTPROVIDERS_GEN_ALL_CORE
[facility]  Oncology : Kala Hernandez ([phone]).

## 2018-01-08 NOTE — ED PROVIDER NOTE - MEDICAL DECISION MAKING DETAILS
doni - sz during chemo - with loc and incontinence question hypersensitty to chemo given benadryl steroids and pepcid , ativan with cesation of sz , ho mets to brain - will ct to look for progrtsion, iv fluids and onc consult, reeval 47yoM hx NSCLC s/p vats on chemo presenting s/p seizure like activity during chemo infusion. treated outside for possible sensitivity to chemo and had cessation of seizure activity with ativan. pt currently A+O3 w/ nonfocal neuro exam. will check labs, lytes, ct head for disease progression/worsening edema. will consult [facility] for disposition.  doni - sz during chemo - with loc and incontinence question hypersensitty to chemo given benadryl steroids and pepcid , ativan with cesation of sz , ho mets to brain - will ct to look for progrtsion, iv fluids and onc consult, reeval

## 2018-01-08 NOTE — H&P ADULT - ASSESSMENT
47M with h/o  NSCLC s/p VATS, with brain metastasis ( recently started on chemotherapy)  who presents s/p seizure-like activity at chemotherapy session today. Physical exam is notable for cachexia. Labs are notable for marked leukocytosis and bandemia. Pt also has some hyponatremia. CT brain shows previously seen metastasis and no acute infarct or hemorrhage. 47M with h/o  NSCLC s/p VATS, with brain metastasis ( recently started on chemotherapy - Taxotere)  who presents s/p seizure-like activity at chemotherapy session today. Physical exam is notable for cachexia. Labs are notable for marked leukocytosis and bandemia. Pt also has some hyponatremia. CT brain shows previously seen metastasis and no acute infarct or hemorrhage.

## 2018-01-08 NOTE — CONSULT NOTE ADULT - ASSESSMENT
46 yo male with PMHx of NSCLC s/p VATS, mets to brain, currently on chemo who presents s/p seizure-like activity at chemotherapy session. Patient endorsed LOC and urinary incontinence from incident. Physical exam nonfocal and HCT unremarkable for acute changes. Seizure-like activity may be 2/2 electrolyte imbalance vs brain mets.     Recommend:    Routine EEG  Electrolyte correction   Load Keppra 1g (500mg bid) 46 yo male with PMHx of NSCLC s/p VATS, mets to brain, currently on chemo who presents s/p seizure at chemotherapy session. Patient endorsed LOC and urinary incontinence from incident. Physical exam nonfocal and HCT unremarkable for acute changes.Pt hyponatremic, Given structural lesions, reasonable to start AEDs CTH does not well visualize mets    Recommend:    Routine EEG  manage metabolic/issues per medicine  Load Keppra 1g and start 500 BID  Known mets, unlikely  repeat MRI will

## 2018-01-08 NOTE — H&P ADULT - HISTORY OF PRESENT ILLNESS
47M with h/o  NSCLC s/p VATS, with brain metastasis ( recently started on chemotherapy)  who presents s/p seizure-like activity at chemotherapy session today. Today was the second dose of  chemotherapy and 5 minutes into session pt had an episode of LOC and bowel incontinence. Patient denies any aura prior to the episode.. He was given benadryl, solumedrol, pepcid and ativan during the episode on account of concern for possible reaction/sensitivity to chemotherapy.     Pt currently feels well and denies any acute c/o. He denies recent  fever/chills, n/v/d. He denies any confusion, headache, dizziness or  focal weakness. Per his wife pt is back to baseline mental status.   Of note this is patient's second seizure-like activity in the month in the context of chemotherapy.    ED course  VS : 141/80  133 16 O2 98% on 2L T 98.5F  Labs : wbc 23.6  h/h  10.8/31  plt 351  band neutrophils 15  Na 125 Cl 88 bun/CR 12/0.66 Phos 2 Mg 1.7    Treatment : Cefepime 1g IVPB x 1,Vancomycin 1g IVPB x 1, IVF NS 1L x 2  Seen by Neurology 47M with h/o  NSCLC s/p VATS, with brain metastasis ( recently started on chemotherapy- Taxotere )  who presents s/p seizure-like activity at chemotherapy session today. Today was the second dose of  chemotherapy and 5 minutes into session pt had an episode of tremulousness, LOC and bowel incontinence. Patient denies any aura prior to the episode.. He was given benadryl, solumedrol, pepcid and ativan during the episode on account of concern for possible reaction/sensitivity to chemotherapy.     Pt currently feels well and denies any acute c/o. He denies recent  fever/chills, n/v/d. He denies any confusion, headache, dizziness or  focal weakness. Per his wife pt is back to baseline mental status.   Of note this is patient's second seizure-like activity in the month while receiving Taxotere infusion.    ED course  VS : 141/80  133 16 O2 98% on 2L T 98.5F  Labs : wbc 23.6  h/h  10.8/31  plt 351  band neutrophils 15  Na 125 Cl 88 bun/CR 12/0.66 Phos 2 Mg 1.7    Treatment : Cefepime 1g IVPB x 1,Vancomycin 1g IVPB x 1, IVF NS 1L x 2  Seen by Neurology

## 2018-01-08 NOTE — ED PROVIDER NOTE - OBJECTIVE STATEMENT
Eun Griffin M.D: 47yoM hx NSCLC s/p VATS, with mets to brain on chemo, BIBEMS s/p seizure like activity during chemo. today was second dose of this particular chemo and 5 minutes into session pt had episode where he lost consciousness and had loss of bowel incontinence. pt was given benadryl, solumedrol, pepcid, ativan-- concern for possible reaction/sensitivity to chemo. here pt feels well and is without complaint.  Onc: Caroline

## 2018-01-08 NOTE — ED ADULT NURSE NOTE - OBJECTIVE STATEMENT
48 yo male with PMH lung cancer with mets to the brain (diagnosed September 2017) presents to the ED from [facility] c/o seizure. as per EMS patient had a seizure during second round of a new chemo medication for 1 minute, became post ictal and had a BM. EMS papers from [facility] states they believe patient had a reaction to the new chemo medication. patient was given 20mg pepcid IV, 500cc NS. patient is AAOx3. PERRL. speech clear. gait steady. upper and lower extremities equal in strength bilaterally with positive sensation. lung sounds clear bilaterally. cap refill <3sec. patient denies fever, chills, N/V/D, HA, dizziness, cough, chest pain, SOB. VSS. sinus tachycardia on the monitor in the 140s. MD at the bedside. 46 yo male with PMH lung cancer with mets to the brain (diagnosed September 2017) presents to the ED from [facility] c/o seizure. as per EMS patient had a seizure during second round of a new chemo medication for 1 minute, became post ictal and had a BM. EMS papers from [facility] states they believe patient had a reaction to the new chemo medication. patient was given 20mg pepcid IV, 500cc NS. patient is AAOx3. PERRL. speech clear. gait steady. upper and lower extremities equal in strength bilaterally with positive sensation. lung sounds clear bilaterally. cap refill <3sec. patient denies fever, chills, N/V/D, HA, dizziness, cough, chest pain, SOB. VSS. sinus tachycardia on the monitor in the 140s. MD at the bedside. patient states he goes to chemo 2x a month. today's chemo was not completed.

## 2018-01-09 DIAGNOSIS — G89.3 NEOPLASM RELATED PAIN (ACUTE) (CHRONIC): ICD-10-CM

## 2018-01-09 DIAGNOSIS — R11.2 NAUSEA WITH VOMITING, UNSPECIFIED: ICD-10-CM

## 2018-01-09 DIAGNOSIS — Z51.11 ENCOUNTER FOR ANTINEOPLASTIC CHEMOTHERAPY: ICD-10-CM

## 2018-01-09 LAB
ANION GAP SERPL CALC-SCNC: 10 MMOL/L — SIGNIFICANT CHANGE UP (ref 5–17)
APPEARANCE UR: CLEAR — SIGNIFICANT CHANGE UP
BACTERIA # UR AUTO: NEGATIVE /HPF — SIGNIFICANT CHANGE UP
BILIRUB UR-MCNC: NEGATIVE — SIGNIFICANT CHANGE UP
BUN SERPL-MCNC: 13 MG/DL — SIGNIFICANT CHANGE UP (ref 7–23)
CALCIUM SERPL-MCNC: 8.5 MG/DL — SIGNIFICANT CHANGE UP (ref 8.4–10.5)
CHLORIDE SERPL-SCNC: 93 MMOL/L — LOW (ref 96–108)
CHLORIDE UR-SCNC: 136 MMOL/L — SIGNIFICANT CHANGE UP
CO2 SERPL-SCNC: 26 MMOL/L — SIGNIFICANT CHANGE UP (ref 22–31)
COLOR SPEC: SIGNIFICANT CHANGE UP
CREAT ?TM UR-MCNC: 43 MG/DL — SIGNIFICANT CHANGE UP
CREAT SERPL-MCNC: 0.66 MG/DL — SIGNIFICANT CHANGE UP (ref 0.5–1.3)
DIFF PNL FLD: NEGATIVE — SIGNIFICANT CHANGE UP
GLUCOSE SERPL-MCNC: 129 MG/DL — HIGH (ref 70–99)
GLUCOSE UR QL: NEGATIVE — SIGNIFICANT CHANGE UP
HCT VFR BLD CALC: 28.8 % — LOW (ref 39–50)
HGB BLD-MCNC: 9.6 G/DL — LOW (ref 13–17)
KETONES UR-MCNC: NEGATIVE — SIGNIFICANT CHANGE UP
LEUKOCYTE ESTERASE UR-ACNC: NEGATIVE — SIGNIFICANT CHANGE UP
MAGNESIUM SERPL-MCNC: 1.6 MG/DL — SIGNIFICANT CHANGE UP (ref 1.6–2.6)
MCHC RBC-ENTMCNC: 31 PG — SIGNIFICANT CHANGE UP (ref 27–34)
MCHC RBC-ENTMCNC: 33.4 GM/DL — SIGNIFICANT CHANGE UP (ref 32–36)
MCV RBC AUTO: 93 FL — SIGNIFICANT CHANGE UP (ref 80–100)
NITRITE UR-MCNC: NEGATIVE — SIGNIFICANT CHANGE UP
OSMOLALITY SERPL: 270 MOS/KG — LOW (ref 275–300)
PH UR: 7 — SIGNIFICANT CHANGE UP (ref 5–8)
PHOSPHATE SERPL-MCNC: 2.6 MG/DL — SIGNIFICANT CHANGE UP (ref 2.5–4.5)
PLATELET # BLD AUTO: 313 K/UL — SIGNIFICANT CHANGE UP (ref 150–400)
POTASSIUM SERPL-MCNC: 4.2 MMOL/L — SIGNIFICANT CHANGE UP (ref 3.5–5.3)
POTASSIUM SERPL-SCNC: 4.2 MMOL/L — SIGNIFICANT CHANGE UP (ref 3.5–5.3)
POTASSIUM UR-SCNC: 26 MMOL/L — SIGNIFICANT CHANGE UP
PROT UR-MCNC: NEGATIVE — SIGNIFICANT CHANGE UP
RBC # BLD: 3.09 M/UL — LOW (ref 4.2–5.8)
RBC # FLD: 16 % — HIGH (ref 10.3–14.5)
RBC CASTS # UR COMP ASSIST: SIGNIFICANT CHANGE UP /HPF (ref 0–2)
SODIUM SERPL-SCNC: 129 MMOL/L — LOW (ref 135–145)
SODIUM UR-SCNC: 161 MMOL/L — SIGNIFICANT CHANGE UP
SP GR SPEC: 1.01 — SIGNIFICANT CHANGE UP (ref 1.01–1.02)
UROBILINOGEN FLD QL: NEGATIVE — SIGNIFICANT CHANGE UP
WBC # BLD: 17.5 K/UL — HIGH (ref 3.8–10.5)
WBC # FLD AUTO: 17.5 K/UL — HIGH (ref 3.8–10.5)
WBC UR QL: SIGNIFICANT CHANGE UP /HPF (ref 0–5)

## 2018-01-09 PROCEDURE — 99223 1ST HOSP IP/OBS HIGH 75: CPT

## 2018-01-09 PROCEDURE — 95819 EEG AWAKE AND ASLEEP: CPT | Mod: 26

## 2018-01-09 PROCEDURE — 99233 SBSQ HOSP IP/OBS HIGH 50: CPT

## 2018-01-09 RX ORDER — CEFEPIME 1 G/1
1000 INJECTION, POWDER, FOR SOLUTION INTRAMUSCULAR; INTRAVENOUS EVERY 12 HOURS
Qty: 0 | Refills: 0 | Status: DISCONTINUED | OUTPATIENT
Start: 2018-01-09 | End: 2018-01-10

## 2018-01-09 RX ORDER — MAGNESIUM SULFATE 500 MG/ML
1 VIAL (ML) INJECTION ONCE
Qty: 0 | Refills: 0 | Status: COMPLETED | OUTPATIENT
Start: 2018-01-09 | End: 2018-01-09

## 2018-01-09 RX ORDER — VANCOMYCIN HCL 1 G
1000 VIAL (EA) INTRAVENOUS EVERY 12 HOURS
Qty: 0 | Refills: 0 | Status: DISCONTINUED | OUTPATIENT
Start: 2018-01-09 | End: 2018-01-10

## 2018-01-09 RX ORDER — OXYCODONE HYDROCHLORIDE 5 MG/1
5 TABLET ORAL EVERY 4 HOURS
Qty: 0 | Refills: 0 | Status: DISCONTINUED | OUTPATIENT
Start: 2018-01-09 | End: 2018-01-09

## 2018-01-09 RX ORDER — OXYCODONE HYDROCHLORIDE 5 MG/1
10 TABLET ORAL EVERY 4 HOURS
Qty: 0 | Refills: 0 | Status: DISCONTINUED | OUTPATIENT
Start: 2018-01-09 | End: 2018-01-12

## 2018-01-09 RX ORDER — CEFEPIME 1 G/1
1000 INJECTION, POWDER, FOR SOLUTION INTRAMUSCULAR; INTRAVENOUS EVERY 12 HOURS
Qty: 0 | Refills: 0 | Status: DISCONTINUED | OUTPATIENT
Start: 2018-01-09 | End: 2018-01-09

## 2018-01-09 RX ORDER — METHADONE HYDROCHLORIDE 40 MG/1
10 TABLET ORAL THREE TIMES A DAY
Qty: 0 | Refills: 0 | Status: DISCONTINUED | OUTPATIENT
Start: 2018-01-09 | End: 2018-01-12

## 2018-01-09 RX ORDER — HYDROMORPHONE HYDROCHLORIDE 2 MG/ML
0.5 INJECTION INTRAMUSCULAR; INTRAVENOUS; SUBCUTANEOUS ONCE
Qty: 0 | Refills: 0 | Status: DISCONTINUED | OUTPATIENT
Start: 2018-01-09 | End: 2018-01-09

## 2018-01-09 RX ORDER — HYDROMORPHONE HYDROCHLORIDE 2 MG/ML
1 INJECTION INTRAMUSCULAR; INTRAVENOUS; SUBCUTANEOUS ONCE
Qty: 0 | Refills: 0 | Status: DISCONTINUED | OUTPATIENT
Start: 2018-01-09 | End: 2018-01-09

## 2018-01-09 RX ORDER — OXYCODONE HYDROCHLORIDE 5 MG/1
15 TABLET ORAL EVERY 4 HOURS
Qty: 0 | Refills: 0 | Status: DISCONTINUED | OUTPATIENT
Start: 2018-01-09 | End: 2018-01-12

## 2018-01-09 RX ORDER — OXYCODONE HYDROCHLORIDE 5 MG/1
10 TABLET ORAL ONCE
Qty: 0 | Refills: 0 | Status: DISCONTINUED | OUTPATIENT
Start: 2018-01-09 | End: 2018-01-09

## 2018-01-09 RX ORDER — METHADONE HYDROCHLORIDE 40 MG/1
5 TABLET ORAL EVERY 8 HOURS
Qty: 0 | Refills: 0 | Status: DISCONTINUED | OUTPATIENT
Start: 2018-01-09 | End: 2018-01-09

## 2018-01-09 RX ORDER — GABAPENTIN 400 MG/1
300 CAPSULE ORAL THREE TIMES A DAY
Qty: 0 | Refills: 0 | Status: DISCONTINUED | OUTPATIENT
Start: 2018-01-09 | End: 2018-01-12

## 2018-01-09 RX ORDER — POTASSIUM PHOSPHATE, MONOBASIC POTASSIUM PHOSPHATE, DIBASIC 236; 224 MG/ML; MG/ML
15 INJECTION, SOLUTION INTRAVENOUS ONCE
Qty: 0 | Refills: 0 | Status: COMPLETED | OUTPATIENT
Start: 2018-01-09 | End: 2018-01-09

## 2018-01-09 RX ORDER — OXYCODONE HYDROCHLORIDE 5 MG/1
10 TABLET ORAL EVERY 4 HOURS
Qty: 0 | Refills: 0 | Status: DISCONTINUED | OUTPATIENT
Start: 2018-01-09 | End: 2018-01-09

## 2018-01-09 RX ADMIN — Medication 100 MILLIGRAM(S): at 16:55

## 2018-01-09 RX ADMIN — GABAPENTIN 100 MILLIGRAM(S): 400 CAPSULE ORAL at 01:30

## 2018-01-09 RX ADMIN — HYDROMORPHONE HYDROCHLORIDE 1 MILLIGRAM(S): 2 INJECTION INTRAMUSCULAR; INTRAVENOUS; SUBCUTANEOUS at 15:28

## 2018-01-09 RX ADMIN — GABAPENTIN 100 MILLIGRAM(S): 400 CAPSULE ORAL at 11:40

## 2018-01-09 RX ADMIN — HYDROMORPHONE HYDROCHLORIDE 0.5 MILLIGRAM(S): 2 INJECTION INTRAMUSCULAR; INTRAVENOUS; SUBCUTANEOUS at 21:10

## 2018-01-09 RX ADMIN — Medication 100 GRAM(S): at 01:30

## 2018-01-09 RX ADMIN — OXYCODONE HYDROCHLORIDE 5 MILLIGRAM(S): 5 TABLET ORAL at 17:00

## 2018-01-09 RX ADMIN — OXYCODONE HYDROCHLORIDE 5 MILLIGRAM(S): 5 TABLET ORAL at 03:00

## 2018-01-09 RX ADMIN — ENOXAPARIN SODIUM 40 MILLIGRAM(S): 100 INJECTION SUBCUTANEOUS at 11:33

## 2018-01-09 RX ADMIN — Medication 250 MILLIGRAM(S): at 11:35

## 2018-01-09 RX ADMIN — OXYCODONE HYDROCHLORIDE 5 MILLIGRAM(S): 5 TABLET ORAL at 01:30

## 2018-01-09 RX ADMIN — OXYCODONE HYDROCHLORIDE 5 MILLIGRAM(S): 5 TABLET ORAL at 12:10

## 2018-01-09 RX ADMIN — OXYCODONE HYDROCHLORIDE 40 MILLIGRAM(S): 5 TABLET ORAL at 16:55

## 2018-01-09 RX ADMIN — SENNA PLUS 2 TABLET(S): 8.6 TABLET ORAL at 21:28

## 2018-01-09 RX ADMIN — OXYCODONE HYDROCHLORIDE 5 MILLIGRAM(S): 5 TABLET ORAL at 11:40

## 2018-01-09 RX ADMIN — Medication 100 MILLIGRAM(S): at 21:28

## 2018-01-09 RX ADMIN — CEFEPIME 100 MILLIGRAM(S): 1 INJECTION, POWDER, FOR SOLUTION INTRAMUSCULAR; INTRAVENOUS at 11:08

## 2018-01-09 RX ADMIN — METHADONE HYDROCHLORIDE 5 MILLIGRAM(S): 40 TABLET ORAL at 16:55

## 2018-01-09 RX ADMIN — LEVETIRACETAM 400 MILLIGRAM(S): 250 TABLET, FILM COATED ORAL at 10:05

## 2018-01-09 RX ADMIN — OXYCODONE HYDROCHLORIDE 15 MILLIGRAM(S): 5 TABLET ORAL at 21:27

## 2018-01-09 RX ADMIN — POTASSIUM PHOSPHATE, MONOBASIC POTASSIUM PHOSPHATE, DIBASIC 62.5 MILLIMOLE(S): 236; 224 INJECTION, SOLUTION INTRAVENOUS at 04:04

## 2018-01-09 RX ADMIN — OXYCODONE HYDROCHLORIDE 10 MILLIGRAM(S): 5 TABLET ORAL at 09:15

## 2018-01-09 RX ADMIN — Medication 100 MILLIGRAM(S): at 14:12

## 2018-01-09 RX ADMIN — OXYCODONE HYDROCHLORIDE 15 MILLIGRAM(S): 5 TABLET ORAL at 22:00

## 2018-01-09 RX ADMIN — HYDROMORPHONE HYDROCHLORIDE 1 MILLIGRAM(S): 2 INJECTION INTRAMUSCULAR; INTRAVENOUS; SUBCUTANEOUS at 15:50

## 2018-01-09 RX ADMIN — LEVETIRACETAM 400 MILLIGRAM(S): 250 TABLET, FILM COATED ORAL at 21:27

## 2018-01-09 RX ADMIN — OXYCODONE HYDROCHLORIDE 5 MILLIGRAM(S): 5 TABLET ORAL at 16:58

## 2018-01-09 RX ADMIN — OXYCODONE HYDROCHLORIDE 10 MILLIGRAM(S): 5 TABLET ORAL at 10:00

## 2018-01-09 NOTE — PROGRESS NOTE ADULT - PROBLEM SELECTOR PLAN 3
unclear etiology  Monitor off of antibiotic unclear etiology  Continue Vancomycin 1gr q12h   Cefepime 1gr q12h   Follow up cultures

## 2018-01-09 NOTE — CONSULT NOTE ADULT - SUBJECTIVE AND OBJECTIVE BOX
47M with h/o  NSCLC s/p VATS, with brain metastasis ( recently started on chemotherapy- Taxotere )  who presents s/p seizure-like activity at chemotherapy session today. Today was the second dose of  chemotherapy and 5 minutes into session pt had an episode of tremulousness, LOC and bowel incontinence. Patient denies any aura prior to the episode.. He was given benadryl, solumedrol, pepcid and ativan during the episode on account of concern for possible reaction/sensitivity to chemotherapy.     Pt currently feels well and denies any acute c/o. He denies recent  fever/chills, n/v/d. He denies any confusion, headache, dizziness or  focal weakness. Per his wife pt is back to baseline mental status.   Of note this is patient's second seizure-like activity in the month while receiving Taxotere infusion.    ED course  VS : 141/80  133 16 O2 98% on 2L T 98.5F  Labs : wbc 23.6  h/h  10.8/31  plt 351  band neutrophils 15  Na 125 Cl 88 bun/CR 12/0.66 Phos 2 Mg 1.7    Treatment : Cefepime 1g IVPB x 1,Vancomycin 1g IVPB x 1, IVF NS 1L x 2  Seen by Neurology (08 Jan 2018 18:25)       ROS: as above       PAST MEDICAL & SURGICAL HISTORY:  Non-small cell lung cancer (NSCLC)  Pleural effusion: s/p pleurex placement/removal      SOCIAL HISTORY:    FAMILY HISTORY:  Family history of lung cancer (Grandparent)  Family history of colon cancer (Sibling)      MEDICATIONS  (STANDING):  benzonatate 100 milliGRAM(s) Oral three times a day  cefepime  IVPB 1000 milliGRAM(s) IV Intermittent every 12 hours  docusate sodium 100 milliGRAM(s) Oral three times a day  enoxaparin Injectable 40 milliGRAM(s) SubCutaneous daily  gabapentin 300 milliGRAM(s) Oral three times a day  HYDROcodone/homatropine Syrup 5 milliLiter(s) Oral every 6 hours  HYDROmorphone  Injectable 0.5 milliGRAM(s) IV Push once  levETIRAcetam  IVPB 500 milliGRAM(s) IV Intermittent every 12 hours  methadone    Tablet 10 milliGRAM(s) Oral three times a day  senna 2 Tablet(s) Oral at bedtime  vancomycin  IVPB 1000 milliGRAM(s) IV Intermittent every 12 hours    MEDICATIONS  (PRN):  acetaminophen   Tablet 650 milliGRAM(s) Oral every 6 hours PRN Mild Pain (1 - 3)  ondansetron    Tablet 4 milliGRAM(s) Oral every 8 hours PRN for nausea  oxyCODONE    IR 10 milliGRAM(s) Oral every 4 hours PRN Moderate Pain (4 - 6)  oxyCODONE    IR 15 milliGRAM(s) Oral every 4 hours PRN Severe Pain (7 - 10)      Allergies    ampicillin (Rash)    Intolerances        Vital Signs Last 24 Hrs  T(C): 37.1 (09 Jan 2018 16:40), Max: 37.1 (09 Jan 2018 08:04)  T(F): 98.8 (09 Jan 2018 16:40), Max: 98.8 (09 Jan 2018 08:04)  HR: 86 (09 Jan 2018 16:40) (84 - 92)  BP: 120/66 (09 Jan 2018 16:40) (115/71 - 124/76)  BP(mean): --  RR: 15 (09 Jan 2018 16:40) (15 - 18)  SpO2: 97% (09 Jan 2018 16:40) (96% - 97%)    PHYSICAL EXAM  General: adult in NAD  HEENT: clear oropharynx, anicteric sclera, pink conjunctiva  Neck: supple  CV: normal S1/S2 with no murmur rubs or gallops  Lungs: positive air movement b/l ant lungs,clear to auscultation, no wheezes, no rales  Abdomen: soft non-tender non-distended, no hepatosplenomegaly  Ext: no clubbing cyanosis or edema  Skin: no rashes and no petechiae  Neuro: alert and oriented X 4, no focal deficits      LABS:                          9.6    17.5  )-----------( 313      ( 09 Jan 2018 13:54 )             28.8         Mean Cell Volume : 93.0 fl  Mean Cell Hemoglobin : 31.0 pg  Mean Cell Hemoglobin Concentration : 33.4 gm/dL  Auto Neutrophil # : x  Auto Lymphocyte # : x  Auto Monocyte # : x  Auto Eosinophil # : x  Auto Basophil # : x  Auto Neutrophil % : x  Auto Lymphocyte % : x  Auto Monocyte % : x  Auto Eosinophil % : x  Auto Basophil % : x      Serial CBC's  01-09 @ 13:54  Hct-28.8 / Hgb-9.6 / Plat-313 / RBC-3.09 / WBC-17.5  Serial CBC's  01-08 @ 13:34  Hct-31.1 / Hgb-10.8 / Plat-351 / RBC-3.37 / WBC-23.6  Serial CBC's  01-08 @ 09:40  Hct-30.5 / Hgb-10.4 / Plat-369 / RBC-3.41 / WBC-22.2      01-09    129<L>  |  93<L>  |  13  ----------------------------<  129<H>  4.2   |  26  |  0.66    Ca    8.5      09 Jan 2018 02:17  Phos  2.6     01-09  Mg     1.6     01-09    TPro  6.8  /  Alb  2.8<L>  /  TBili  0.3  /  DBili  x   /  AST  28  /  ALT  25  /  AlkPhos  129<H>  01-08                      RADIOLOGY & ADDITIONAL STUDIES: 47M with h/o  NSCLC s/p VATS, with brain metastasis s/p RT ( recently started on chemotherapy- Taxotere )  who presents s/p seizure-like activity at chemotherapy session today. Today was the second dose of  chemotherapy and 5 minutes into session pt had an episode of tremulousness, LOC and bowel incontinence. Patient denies any aura prior to the episode.. He was given benadryl, solumedrol, pepcid and ativan during the episode on account of concern for possible reaction/sensitivity to chemotherapy. Of note this is patient's second seizure-like activity in the month while receiving Taxotere infusion.  Currently pt c/o chronic L sided rib/LUQ pain not controlled with current pain regimen. Regimen is in similar place of his chronic pain. Missed a couple of doses of Methadone since admission due to prolonged ER stay.   Of note, pt was due for an MRI as per rad onc in the middle of Jan 2018 to f/u on brain mets.          ROS: as above       PAST MEDICAL & SURGICAL HISTORY:  Non-small cell lung cancer (NSCLC)  Pleural effusion: s/p pleurex placement/removal      SOCIAL HISTORY: lives with family, no alcohol, tobacco use       FAMILY HISTORY:  Family history of lung cancer (Grandparent)  Family history of colon cancer (Sibling)      MEDICATIONS  (STANDING):  benzonatate 100 milliGRAM(s) Oral three times a day  cefepime  IVPB 1000 milliGRAM(s) IV Intermittent every 12 hours  docusate sodium 100 milliGRAM(s) Oral three times a day  enoxaparin Injectable 40 milliGRAM(s) SubCutaneous daily  gabapentin 300 milliGRAM(s) Oral three times a day  HYDROcodone/homatropine Syrup 5 milliLiter(s) Oral every 6 hours  HYDROmorphone  Injectable 0.5 milliGRAM(s) IV Push once  levETIRAcetam  IVPB 500 milliGRAM(s) IV Intermittent every 12 hours  methadone    Tablet 10 milliGRAM(s) Oral three times a day  senna 2 Tablet(s) Oral at bedtime  vancomycin  IVPB 1000 milliGRAM(s) IV Intermittent every 12 hours    MEDICATIONS  (PRN):  acetaminophen   Tablet 650 milliGRAM(s) Oral every 6 hours PRN Mild Pain (1 - 3)  ondansetron    Tablet 4 milliGRAM(s) Oral every 8 hours PRN for nausea  oxyCODONE    IR 10 milliGRAM(s) Oral every 4 hours PRN Moderate Pain (4 - 6)  oxyCODONE    IR 15 milliGRAM(s) Oral every 4 hours PRN Severe Pain (7 - 10)      Allergies    ampicillin (Rash)    Intolerances        Vital Signs Last 24 Hrs  T(C): 37.1 (09 Jan 2018 16:40), Max: 37.1 (09 Jan 2018 08:04)  T(F): 98.8 (09 Jan 2018 16:40), Max: 98.8 (09 Jan 2018 08:04)  HR: 86 (09 Jan 2018 16:40) (84 - 92)  BP: 120/66 (09 Jan 2018 16:40) (115/71 - 124/76)  BP(mean): --  RR: 15 (09 Jan 2018 16:40) (15 - 18)  SpO2: 97% (09 Jan 2018 16:40) (96% - 97%)    PHYSICAL EXAM  General: adult in mild distress 2/2 pain   HEENT: clear oropharynx, anicteric sclera, pink conjunctiva  Neck: supple  CV: normal S1/S2 with no murmur rubs or gallops  Lungs: positive air movement b/l ant lungs,clear to auscultation, no wheezes, no rales  Abdomen: soft non-tender non-distended, no hepatosplenomegaly  Ext: no clubbing cyanosis or edema  Skin: no rashes and no petechiae  Neuro: alert and oriented X 4, no focal deficits      LABS:                          9.6    17.5  )-----------( 313      ( 09 Jan 2018 13:54 )             28.8         Mean Cell Volume : 93.0 fl  Mean Cell Hemoglobin : 31.0 pg  Mean Cell Hemoglobin Concentration : 33.4 gm/dL  Auto Neutrophil # : x  Auto Lymphocyte # : x  Auto Monocyte # : x  Auto Eosinophil # : x  Auto Basophil # : x  Auto Neutrophil % : x  Auto Lymphocyte % : x  Auto Monocyte % : x  Auto Eosinophil % : x  Auto Basophil % : x      Serial CBC's  01-09 @ 13:54  Hct-28.8 / Hgb-9.6 / Plat-313 / RBC-3.09 / WBC-17.5  Serial CBC's  01-08 @ 13:34  Hct-31.1 / Hgb-10.8 / Plat-351 / RBC-3.37 / WBC-23.6  Serial CBC's  01-08 @ 09:40  Hct-30.5 / Hgb-10.4 / Plat-369 / RBC-3.41 / WBC-22.2      01-09    129<L>  |  93<L>  |  13  ----------------------------<  129<H>  4.2   |  26  |  0.66    Ca    8.5      09 Jan 2018 02:17  Phos  2.6     01-09  Mg     1.6     01-09    TPro  6.8  /  Alb  2.8<L>  /  TBili  0.3  /  DBili  x   /  AST  28  /  ALT  25  /  AlkPhos  129<H>  01-08                      RADIOLOGY & ADDITIONAL STUDIES:

## 2018-01-09 NOTE — CONSULT NOTE ADULT - PROBLEM SELECTOR RECOMMENDATION 9
Reaction to Taxotere chemo x 2. No further taxotere to be given.   Pt will be changed to next line treatment. f/u with Dr. Hernandez upon hospital discharge.

## 2018-01-09 NOTE — CONSULT NOTE ADULT - PROBLEM SELECTOR RECOMMENDATION 2
Pt in significant pain currently. 1 hr ago received Methadone 5 mg, Oxycontin 40 mg, Oxycodone 5 mg, and Dilaudid. Reports at home pain is controlled. I reviewed [employer] records. On Porfirio 3, 2018, out patient palliative attending adjusted pt's pain regimen. Current pain regimen is inadequate. Please note the following changes:     Methadone 10 mg TID  Oxycodone 10 mg Q 4 hrs PRN mod pain, 15 mg Q 4 hrs PRN severe pain  Gabapentin 300 mg TID    Please also note that Oxycontin was d/c and changed to Methadone, therefore although pt filled Oxycontin recently he is not currently taking it.     I put all the new orders in. Also will give pt a 1 time dose of Dilaudid 0.5 mg IVP now. Pt may need more breakthrough doses overnight as he has been under dosed now for 2 days.      phone was used and records reviewed for above history.

## 2018-01-09 NOTE — CHART NOTE - NSCHARTNOTEFT_GEN_A_CORE
Patient is a 47y old  Male who presents with a chief complaint of seizure (2018 18:25)    SUBJECTIVE / OVERNIGHT EVENTS:    New admit from the ED. Patient complaining of severe pain. Per chart review and ISTOP check - patient is taking 5 mg q8 hours methadone, 40 mg tid of Oxycontin, and 5 mg of oxycodone. Pain 2/2 malignancy. Intermittently refusing scheduled pain medications after frustration with being hospitalized. Is Afghan speaking only. [facility].    MEDICATIONS  (STANDING):  benzonatate 100 milliGRAM(s) Oral three times a day  cefepime  IVPB 1000 milliGRAM(s) IV Intermittent every 12 hours  docusate sodium 100 milliGRAM(s) Oral three times a day  enoxaparin Injectable 40 milliGRAM(s) SubCutaneous daily  HYDROcodone/homatropine Syrup 5 milliLiter(s) Oral every 6 hours  HYDROmorphone  Injectable 1 milliGRAM(s) IV Push once  levETIRAcetam  IVPB 500 milliGRAM(s) IV Intermittent every 12 hours  oxyCODONE  ER Tablet 40 milliGRAM(s) Oral every 12 hours  senna 2 Tablet(s) Oral at bedtime  vancomycin  IVPB 1000 milliGRAM(s) IV Intermittent every 12 hours    MEDICATIONS  (PRN):  acetaminophen   Tablet 650 milliGRAM(s) Oral every 6 hours PRN Mild Pain (1 - 3)  gabapentin 100 milliGRAM(s) Oral every 8 hours PRN severe pain  ondansetron    Tablet 4 milliGRAM(s) Oral every 8 hours PRN for nausea  oxyCODONE    IR 5 milliGRAM(s) Oral every 3 hours PRN Moderate Pain (4 - 6)      CAPILLARY BLOOD GLUCOSE        I&O's Summary      T(C): 37.1 (18 @ 08:04), Max: 37.1 (18 @ 08:04)  HR: 84 (18 @ 08:04) (84 - 113)  BP: 124/76 (18 @ 08:04) (115/71 - 136/88)  RR: 18 (18 @ 08:04) (18 - 18)  SpO2: 96% (18 @ 08:04) (95% - 100%)    PHYSICAL EXAM:  GENERAL: NAD, well-developed  HEAD:  Atraumatic, Normocephalic  EYES: EOMI, PERRLA, conjunctiva and sclera clear  NECK: Supple, No JVD  CHEST/LUNG: Clear to auscultation bilaterally; No wheeze  HEART: Regular rate and rhythm; No murmurs, rubs, or gallops  ABDOMEN: Soft, Nontender, Nondistended; Bowel sounds present  EXTREMITIES:  2+ Peripheral Pulses, No clubbing, cyanosis, or edema  PSYCH: AAOx3  NEUROLOGY: non-focal  SKIN: No rashes or lesions    LABS:                        9.6    17.5  )-----------( 313      ( 2018 13:54 )             28.8     WBC Trend: 17.5<--, 23.6<--, 22.2<--      129<L>  |  93<L>  |  13  ----------------------------<  129<H>  4.2   |  26  |  0.66    Ca    8.5      2018 02:17  Phos  2.6       Mg     1.6         TPro  6.8  /  Alb  2.8<L>  /  TBili  0.3  /  DBili  x   /  AST  28  /  ALT  25  /  AlkPhos  129<H>      Creatinine Trend: 0.66<--, 0.66<--, 0.66<--, 0.65<--, 0.53<--, 0.57<--        Urinalysis Basic - ( 2018 04:04 )    Color: PL Yellow / Appearance: Clear / S.013 / pH: x  Gluc: x / Ketone: Negative  / Bili: Negative / Urobili: Negative   Blood: x / Protein: Negative / Nitrite: Negative   Leuk Esterase: Negative / RBC: 0-2 /HPF / WBC 0-2 /HPF   Sq Epi: x / Non Sq Epi: x / Bacteria: Negative /HPF      RADIOLOGY & ADDITIONAL TESTS:    MR head pending for new onset seizure and hx of brain mets.    Imaging Personally Reviewed:    Consultant(s) Notes Reviewed:      Care Discussed with Consultants/Other Providers:    Assessment and Plan:   · Assessment	  47M with h/o  NSCLC s/p VATS, with brain metastasis ( recently started on chemotherapy - Taxotere)  who presents s/p seizure-like activity at chemotherapy session today. Physical exam is notable for cachexia. Labs are notable for marked leukocytosis and bandemia. Pt also has some hyponatremia. CT brain shows previously seen metastasis and no acute infarct or hemorrhage.     Problem/Plan - 1:  ·  Problem: Seizure.  Plan: possibly 2/2 brain mets vs an adverse reaction to Taxotere infusion  Neurology reccs appreciated  c/w Keppra per Neuro reccs  check EEG  Fall precautions.    Problem/Plan - 2:     Problem: Pain of Malignancy  - On 40 mg tid of oxycontin, 5 mg oxycodone q6 hr, 5 mg methadone q8hrs for pain control at home.  - Will consider palliative f/u while IP.  - Dilaudid 1 mg IV once for severe pain. Likely breakthrough for refusal of oxycontin earlier this AM.  - c/w methadone 5 mg q8hr, 40 mg BID oxycontin, and 5/10      Problem/Plan - 2:  ·  Problem: Brain mass.  Plan: metastatic lesions from NSCLC  c/w Keppra per Neurology reccs.      Problem/Plan - 3:  ·  Problem: Bandemia.  Plan: unclear etiology  possible underlying sepsis  UA negative for UTI  Will check CXR  c/w broad spectrum IV abx coverage in light of immunocompromised setting   ( cefepime , vancomycin)  f/up blood cx.      Problem/Plan - 4:  ·  Problem: Hyponatremia.  Plan: has received 2L NS  repeat BMP  replete Na as indicated.      Problem/Plan - 5:  ·  Problem: Non-small cell lung cancer, unspecified laterality.  Plan: on chemotherapy  continue f/up with Oncology.      Problem/Plan - 6:  Problem: Prophylactic measure. Plan: Lovenox SC for DVT ppx Patient is a 47y old  Male who presents with a chief complaint of seizure (2018 18:25)    SUBJECTIVE / OVERNIGHT EVENTS:    New admit from the ED. Patient complaining of severe pain. Per chart review and ISTOP check - patient is taking 5 mg q8 hours methadone, 40 mg tid of Oxycontin, and 5 mg of oxycodone. Pain 2/2 malignancy. Intermittently refusing scheduled pain medications after frustration with being hospitalized. Is Polish speaking only. [facility].    MEDICATIONS  (STANDING):  benzonatate 100 milliGRAM(s) Oral three times a day  cefepime  IVPB 1000 milliGRAM(s) IV Intermittent every 12 hours  docusate sodium 100 milliGRAM(s) Oral three times a day  enoxaparin Injectable 40 milliGRAM(s) SubCutaneous daily  HYDROcodone/homatropine Syrup 5 milliLiter(s) Oral every 6 hours  HYDROmorphone  Injectable 1 milliGRAM(s) IV Push once  levETIRAcetam  IVPB 500 milliGRAM(s) IV Intermittent every 12 hours  oxyCODONE  ER Tablet 40 milliGRAM(s) Oral every 12 hours  senna 2 Tablet(s) Oral at bedtime  vancomycin  IVPB 1000 milliGRAM(s) IV Intermittent every 12 hours    MEDICATIONS  (PRN):  acetaminophen   Tablet 650 milliGRAM(s) Oral every 6 hours PRN Mild Pain (1 - 3)  gabapentin 100 milliGRAM(s) Oral every 8 hours PRN severe pain  ondansetron    Tablet 4 milliGRAM(s) Oral every 8 hours PRN for nausea  oxyCODONE    IR 5 milliGRAM(s) Oral every 3 hours PRN Moderate Pain (4 - 6)      CAPILLARY BLOOD GLUCOSE        I&O's Summary      T(C): 37.1 (18 @ 08:04), Max: 37.1 (18 @ 08:04)  HR: 84 (18 @ 08:04) (84 - 113)  BP: 124/76 (18 @ 08:04) (115/71 - 136/88)  RR: 18 (18 @ 08:04) (18 - 18)  SpO2: 96% (18 @ 08:04) (95% - 100%)    PHYSICAL EXAM:  GENERAL: NAD, well-developed  HEAD:  Atraumatic, Normocephalic  EYES: EOMI, PERRLA, conjunctiva and sclera clear  NECK: Supple, No JVD  CHEST/LUNG: Left lateral chest wall scar x2. CTA b/l. Pain to palpation over scar.  HEART: Regular rate and rhythm; No murmurs, rubs, or gallops  ABDOMEN: Soft, Nontender, Nondistended; Bowel sounds present  EXTREMITIES:  2+ Peripheral Pulses, No clubbing, cyanosis, or edema  PSYCH: AAOx3  NEUROLOGY: non-focal  SKIN: No rashes or lesions    LABS:                        9.6    17.5  )-----------( 313      ( 2018 13:54 )             28.8     WBC Trend: 17.5<--, 23.6<--, 22.2<--  -    129<L>  |  93<L>  |  13  ----------------------------<  129<H>  4.2   |  26  |  0.66    Ca    8.5      2018 02:17  Phos  2.6     -  Mg     1.6         TPro  6.8  /  Alb  2.8<L>  /  TBili  0.3  /  DBili  x   /  AST  28  /  ALT  25  /  AlkPhos  129<H>  -    Creatinine Trend: 0.66<--, 0.66<--, 0.66<--, 0.65<--, 0.53<--, 0.57<--        Urinalysis Basic - ( 2018 04:04 )    Color: PL Yellow / Appearance: Clear / S.013 / pH: x  Gluc: x / Ketone: Negative  / Bili: Negative / Urobili: Negative   Blood: x / Protein: Negative / Nitrite: Negative   Leuk Esterase: Negative / RBC: 0-2 /HPF / WBC 0-2 /HPF   Sq Epi: x / Non Sq Epi: x / Bacteria: Negative /HPF      RADIOLOGY & ADDITIONAL TESTS:    MR head pending for new onset seizure and hx of brain mets.    Imaging Personally Reviewed:    Consultant(s) Notes Reviewed:      Care Discussed with Consultants/Other Providers:    Assessment and Plan:   · Assessment	  47M with h/o  NSCLC s/p VATS, with brain metastasis ( recently started on chemotherapy - Taxotere)  who presents s/p seizure-like activity at chemotherapy session today. Physical exam is notable for cachexia. Labs are notable for marked leukocytosis and bandemia. Pt also has some hyponatremia. CT brain shows previously seen metastasis and no acute infarct or hemorrhage.     Problem/Plan - 1:  ·  Problem: Seizure.  Plan: possibly 2/2 brain mets vs an adverse reaction to Taxotere infusion  Neurology reccs appreciated  c/w Keppra per Neuro reccs  check EEG  Fall precautions.    Problem/Plan - 2:     Problem: Pain of Malignancy  - On 40 mg tid of oxycontin, 5 mg oxycodone q6 hr, 5 mg methadone q8hrs for pain control at home.  - Will consider palliative f/u while IP.  - Dilaudid 1 mg IV once for severe pain. Likely breakthrough for refusal of oxycontin earlier this AM.  - c/w methadone 5 mg q8hr, 40 mg BID oxycontin, and 5/10      Problem/Plan - 2:  ·  Problem: Brain mass.  Plan: metastatic lesions from NSCLC  c/w Keppra per Neurology reccs.      Problem/Plan - 3:  ·  Problem: Bandemia.  Plan: unclear etiology  possible underlying sepsis  UA negative for UTI  Will check CXR  c/w broad spectrum IV abx coverage in light of immunocompromised setting   ( cefepime , vancomycin)  f/up blood cx.      Problem/Plan - 4:  ·  Problem: Hyponatremia.  Plan: has received 2L NS  repeat BMP  replete Na as indicated.      Problem/Plan - 5:  ·  Problem: Non-small cell lung cancer, unspecified laterality.  Plan: on chemotherapy  continue f/up with Oncology.      Problem/Plan - 6:  Problem: Prophylactic measure. Plan: Lovenox SC for DVT ppx

## 2018-01-09 NOTE — CHART NOTE - NSCHARTNOTEFT_GEN_A_CORE
MEDICINE NP    TREV RUIZ  47y Male  Patient is a 47y old  Male who presents with a chief complaint of seizure (08 Jan 2018 18:25)       Event Summary:  -Follow up repeat  uptrending from 128/ 125.  Pt. baseline (131-133).  -Will hold off IVF for now and repeat in BMP AM  -Renal consult if indicated   -D/w HICS and aware      ASUNCION Beckwith-BC  Medicine Department  #[id number]

## 2018-01-09 NOTE — PROGRESS NOTE ADULT - SUBJECTIVE AND OBJECTIVE BOX
STEFFANIEADDYMERLINEALEXANDERPRAVEENTREV  MRN-[id number]  Physician: Denver Ash [phone]    Chief Complain:Patient is a 47y old  Male who presents with a chief complaint of seizure (2018 18:25)    SUBJECTIVE / OVERNIGHT EVENTS:  patient seen and examined at bedside. Patient reports mild dull pain in his left shoulder which gets worse with moving and improves with pain medications. Patient denies nausea, vomiting, chest pain.     Review of Systems:  14 point ROS negative in detail except for the above:     MEDICATIONS  (STANDING):  benzonatate 100 milliGRAM(s) Oral three times a day  cefepime  IVPB 1000 milliGRAM(s) IV Intermittent every 12 hours  docusate sodium 100 milliGRAM(s) Oral three times a day  enoxaparin Injectable 40 milliGRAM(s) SubCutaneous daily  HYDROcodone/homatropine Syrup 5 milliLiter(s) Oral every 6 hours  levETIRAcetam  IVPB 500 milliGRAM(s) IV Intermittent every 12 hours  oxyCODONE  ER Tablet 40 milliGRAM(s) Oral every 12 hours  senna 2 Tablet(s) Oral at bedtime  vancomycin  IVPB 1000 milliGRAM(s) IV Intermittent every 12 hours    MEDICATIONS  (PRN):  acetaminophen   Tablet 650 milliGRAM(s) Oral every 6 hours PRN Mild Pain (1 - 3)  gabapentin 100 milliGRAM(s) Oral every 8 hours PRN severe pain  ondansetron    Tablet 4 milliGRAM(s) Oral every 8 hours PRN for nausea  oxyCODONE    IR 5 milliGRAM(s) Oral every 3 hours PRN Moderate Pain (4 - 6)      T(C): 37.1 (18 @ 08:04), Max: 37.1 (18 @ 08:04)  HR: 84 (18 @ 08:04) (84 - 141)  BP: 124/76 (18 @ 08:04) (115/71 - 141/87)  RR: 18 (18 @ 08:04) (18 - 18)  SpO2: 96% (18 @ 08:04) (95% - 100%)    CAPILLARY BLOOD GLUCOSE    I&O's Summary      Allergies    ampicillin (Rash)    Intolerances    PHYSICAL EXAM:  GENERAL: Not in distress,  HEAD:  Atraumatic, Normocephalic  EYES: EOMI, PERRLA, conjunctiva and sclera clear  NECK: Supple, No JVD  CHEST/LUNG: Clear to auscultation bilaterally; No wheeze  HEART: Regular rate and rhythm; No murmurs, rubs, or edema  ABDOMEN: Soft, Nontender, Nondistended; Bowel sounds present  EXTREMITIES: No joint effusions, good muscle tone and bulk  PSYCH: Normal mood and affect, alert and oriented  NEUROLOGY: Grossly intact   SKIN: No rashes or lesions    LABS:                        10.8   23.6  )-----------( 351      ( 2018 13:34 )             31.1     -    129<L>  |  93<L>  |  13  ----------------------------<  129<H>  4.2   |  26  |  0.66    Ca    8.5      2018 02:17  Phos  2.0       Mg     1.7         TPro  6.8  /  Alb  2.8<L>  /  TBili  0.3  /  DBili  x   /  AST  28  /  ALT  25  /  AlkPhos  129<H>      LIVER FUNCTIONS - ( 2018 13:34 )  Alb: 2.8 g/dL / Pro: 6.8 g/dL / ALK PHOS: 129 U/L / ALT: 25 U/L RC / AST: 28 U/L / GGT: x           Urinalysis Basic - ( 2018 04:04 )    Color: PL Yellow / Appearance: Clear / S.013 / pH: x  Gluc: x / Ketone: Negative  / Bili: Negative / Urobili: Negative   Blood: x / Protein: Negative / Nitrite: Negative   Leuk Esterase: Negative / RBC: 0-2 /HPF / WBC 0-2 /HPF   Sq Epi: x / Non Sq Epi: x / Bacteria: Negative /HPF    Blood Cultures    RADIOLOGY & ADDITIONAL TESTS:    Imaging:  < from: CT Head No Cont (18 @ 14:25) >  No evidence for acute infarct or acute hemorrhage.    Previously seen metastatic lesions from MRI on 2017 are notwell   visualized on this noncontrast imaging study. Consider follow-up brain   MRI with without contrast for further workup if warranted.    EKG/Telemetry:  < from: 12 Lead ECG (18 @ 13:57) >  Ventricular Rate 116 BPM    Atrial Rate 116 BPM    P-R Interval 130 ms    QRS Duration 84 ms     ms    QTc 439 ms    P Axis 25 degrees    R Axis 19 degrees    T Axis 30 degrees    Diagnosis Line SINUS TACHYCARDIA  OTHERWISE NORMAL ECG    Consultant(s) Notes Reviewed:  Neurology     Care Discussed with Consultants/Other Providers: Oncology

## 2018-01-09 NOTE — PROGRESS NOTE ADULT - ASSESSMENT
47M with h/o  NSCLC s/p VATS, with brain metastasis ( recently started on chemotherapy - Taxotere)  who presents s/p seizure-like activity at chemotherapy session today. Physical exam is notable for cachexia. Labs are notable for marked leukocytosis and bandemia. Pt also has some hyponatremia. CT brain shows previously seen metastasis and no acute infarct or hemorrhage.

## 2018-01-09 NOTE — PROGRESS NOTE ADULT - PROBLEM SELECTOR PLAN 5
on chemotherapy  continue f/up with Oncology on chemotherapy  continue f/up with Oncology  Oxycontin 40mg BID   Oxycodone 5mg PRN

## 2018-01-09 NOTE — CONSULT NOTE ADULT - ATTENDING COMMENTS
Seen and examined on rounds with housestaff.  Episode of tremulousness, incontinence, and pre-syncope during chemo infusion.  Had similar reaction when given same chemotherapy recently.  Suspicion for seizure is low given description of event.  Would check routine EEG.  If unremarkable, can DC Keppra.
[phone]

## 2018-01-09 NOTE — PROGRESS NOTE ADULT - PROBLEM SELECTOR PLAN 1
possibly 2/2 brain mets vs an adverse reaction to Taxotere infusion  Neurology reccs appreciated  c/w Keppra as per Neuro reccs  EEG  Fall precautions possibly 2/2 brain mets vs an adverse reaction to Taxotere infusion  Neurology reccs appreciated  c/w Keppra as per Neuro reccs  EEG  Fall precautions  D/W Oncology, will have MRI brain

## 2018-01-10 LAB
ANION GAP SERPL CALC-SCNC: 10 MMOL/L — SIGNIFICANT CHANGE UP (ref 5–17)
BASOPHILS # BLD AUTO: 0.1 K/UL — SIGNIFICANT CHANGE UP (ref 0–0.2)
BASOPHILS NFR BLD AUTO: 0.5 % — SIGNIFICANT CHANGE UP (ref 0–2)
BUN SERPL-MCNC: 12 MG/DL — SIGNIFICANT CHANGE UP (ref 7–23)
CALCIUM SERPL-MCNC: 9.5 MG/DL — SIGNIFICANT CHANGE UP (ref 8.4–10.5)
CHLORIDE SERPL-SCNC: 88 MMOL/L — LOW (ref 96–108)
CO2 SERPL-SCNC: 29 MMOL/L — SIGNIFICANT CHANGE UP (ref 22–31)
CREAT SERPL-MCNC: 0.74 MG/DL — SIGNIFICANT CHANGE UP (ref 0.5–1.3)
CULTURE RESULTS: NO GROWTH — SIGNIFICANT CHANGE UP
EOSINOPHIL # BLD AUTO: 2 K/UL — HIGH (ref 0–0.5)
EOSINOPHIL NFR BLD AUTO: 15.5 % — HIGH (ref 0–6)
GLUCOSE SERPL-MCNC: 92 MG/DL — SIGNIFICANT CHANGE UP (ref 70–99)
HCT VFR BLD CALC: 32.7 % — LOW (ref 39–50)
HGB BLD-MCNC: 11 G/DL — LOW (ref 13–17)
LYMPHOCYTES # BLD AUTO: 1.1 K/UL — SIGNIFICANT CHANGE UP (ref 1–3.3)
LYMPHOCYTES # BLD AUTO: 8.6 % — LOW (ref 13–44)
MAGNESIUM SERPL-MCNC: 1.8 MG/DL — SIGNIFICANT CHANGE UP (ref 1.6–2.6)
MCHC RBC-ENTMCNC: 30.9 PG — SIGNIFICANT CHANGE UP (ref 27–34)
MCHC RBC-ENTMCNC: 33.6 GM/DL — SIGNIFICANT CHANGE UP (ref 32–36)
MCV RBC AUTO: 91.9 FL — SIGNIFICANT CHANGE UP (ref 80–100)
MONOCYTES # BLD AUTO: 1.5 K/UL — HIGH (ref 0–0.9)
MONOCYTES NFR BLD AUTO: 12.2 % — SIGNIFICANT CHANGE UP (ref 2–14)
NEUTROPHILS # BLD AUTO: 8 K/UL — HIGH (ref 1.8–7.4)
NEUTROPHILS NFR BLD AUTO: 63.3 % — SIGNIFICANT CHANGE UP (ref 43–77)
PHOSPHATE SERPL-MCNC: 4.1 MG/DL — SIGNIFICANT CHANGE UP (ref 2.5–4.5)
PLATELET # BLD AUTO: 328 K/UL — SIGNIFICANT CHANGE UP (ref 150–400)
POTASSIUM SERPL-MCNC: 4.2 MMOL/L — SIGNIFICANT CHANGE UP (ref 3.5–5.3)
POTASSIUM SERPL-SCNC: 4.2 MMOL/L — SIGNIFICANT CHANGE UP (ref 3.5–5.3)
RBC # BLD: 3.56 M/UL — LOW (ref 4.2–5.8)
RBC # FLD: 16.3 % — HIGH (ref 10.3–14.5)
SODIUM SERPL-SCNC: 127 MMOL/L — LOW (ref 135–145)
SPECIMEN SOURCE: SIGNIFICANT CHANGE UP
WBC # BLD: 12.6 K/UL — HIGH (ref 3.8–10.5)
WBC # FLD AUTO: 12.6 K/UL — HIGH (ref 3.8–10.5)

## 2018-01-10 PROCEDURE — 70553 MRI BRAIN STEM W/O & W/DYE: CPT | Mod: 26

## 2018-01-10 PROCEDURE — 99233 SBSQ HOSP IP/OBS HIGH 50: CPT | Mod: GC

## 2018-01-10 RX ORDER — SODIUM CHLORIDE 9 MG/ML
1 INJECTION INTRAMUSCULAR; INTRAVENOUS; SUBCUTANEOUS
Qty: 0 | Refills: 0 | Status: DISCONTINUED | OUTPATIENT
Start: 2018-01-10 | End: 2018-01-12

## 2018-01-10 RX ADMIN — SENNA PLUS 2 TABLET(S): 8.6 TABLET ORAL at 21:38

## 2018-01-10 RX ADMIN — OXYCODONE HYDROCHLORIDE 10 MILLIGRAM(S): 5 TABLET ORAL at 18:20

## 2018-01-10 RX ADMIN — OXYCODONE HYDROCHLORIDE 10 MILLIGRAM(S): 5 TABLET ORAL at 06:30

## 2018-01-10 RX ADMIN — METHADONE HYDROCHLORIDE 10 MILLIGRAM(S): 40 TABLET ORAL at 00:12

## 2018-01-10 RX ADMIN — OXYCODONE HYDROCHLORIDE 10 MILLIGRAM(S): 5 TABLET ORAL at 11:32

## 2018-01-10 RX ADMIN — Medication 100 MILLIGRAM(S): at 05:49

## 2018-01-10 RX ADMIN — METHADONE HYDROCHLORIDE 10 MILLIGRAM(S): 40 TABLET ORAL at 09:23

## 2018-01-10 RX ADMIN — ONDANSETRON 4 MILLIGRAM(S): 8 TABLET, FILM COATED ORAL at 05:51

## 2018-01-10 RX ADMIN — OXYCODONE HYDROCHLORIDE 10 MILLIGRAM(S): 5 TABLET ORAL at 19:00

## 2018-01-10 RX ADMIN — CEFEPIME 100 MILLIGRAM(S): 1 INJECTION, POWDER, FOR SOLUTION INTRAMUSCULAR; INTRAVENOUS at 05:49

## 2018-01-10 RX ADMIN — GABAPENTIN 300 MILLIGRAM(S): 400 CAPSULE ORAL at 09:23

## 2018-01-10 RX ADMIN — SODIUM CHLORIDE 1 GRAM(S): 9 INJECTION INTRAMUSCULAR; INTRAVENOUS; SUBCUTANEOUS at 11:32

## 2018-01-10 RX ADMIN — CEFEPIME 100 MILLIGRAM(S): 1 INJECTION, POWDER, FOR SOLUTION INTRAMUSCULAR; INTRAVENOUS at 00:12

## 2018-01-10 RX ADMIN — Medication 100 MILLIGRAM(S): at 15:35

## 2018-01-10 RX ADMIN — METHADONE HYDROCHLORIDE 10 MILLIGRAM(S): 40 TABLET ORAL at 15:35

## 2018-01-10 RX ADMIN — SODIUM CHLORIDE 1 GRAM(S): 9 INJECTION INTRAMUSCULAR; INTRAVENOUS; SUBCUTANEOUS at 21:38

## 2018-01-10 RX ADMIN — GABAPENTIN 300 MILLIGRAM(S): 400 CAPSULE ORAL at 00:12

## 2018-01-10 RX ADMIN — OXYCODONE HYDROCHLORIDE 10 MILLIGRAM(S): 5 TABLET ORAL at 06:01

## 2018-01-10 RX ADMIN — Medication 250 MILLIGRAM(S): at 05:49

## 2018-01-10 RX ADMIN — METHADONE HYDROCHLORIDE 10 MILLIGRAM(S): 40 TABLET ORAL at 23:50

## 2018-01-10 RX ADMIN — Medication 100 MILLIGRAM(S): at 21:37

## 2018-01-10 RX ADMIN — GABAPENTIN 300 MILLIGRAM(S): 400 CAPSULE ORAL at 15:35

## 2018-01-10 RX ADMIN — OXYCODONE HYDROCHLORIDE 15 MILLIGRAM(S): 5 TABLET ORAL at 21:38

## 2018-01-10 RX ADMIN — Medication 250 MILLIGRAM(S): at 00:12

## 2018-01-10 RX ADMIN — LEVETIRACETAM 400 MILLIGRAM(S): 250 TABLET, FILM COATED ORAL at 05:26

## 2018-01-10 RX ADMIN — OXYCODONE HYDROCHLORIDE 10 MILLIGRAM(S): 5 TABLET ORAL at 12:06

## 2018-01-10 RX ADMIN — OXYCODONE HYDROCHLORIDE 15 MILLIGRAM(S): 5 TABLET ORAL at 22:10

## 2018-01-10 RX ADMIN — ENOXAPARIN SODIUM 40 MILLIGRAM(S): 100 INJECTION SUBCUTANEOUS at 11:32

## 2018-01-10 RX ADMIN — GABAPENTIN 300 MILLIGRAM(S): 400 CAPSULE ORAL at 23:50

## 2018-01-10 NOTE — PROGRESS NOTE ADULT - PROBLEM SELECTOR PLAN 5
on chemotherapy  continue f/up with Oncology  Oxycontin / Oxycodone PRN / methadone for pain control -on chemotherapy  -continue f/up with Oncology  -Oxycontin / Oxycodone PRN / methadone for pain control

## 2018-01-10 NOTE — PROGRESS NOTE ADULT - SUBJECTIVE AND OBJECTIVE BOX
Medicine Accept Note  Patient is a 47y old  Male who presents with a chief complaint of seizure (2018 18:25)    HPI:  47M with h/o  NSCLC s/p VATS, with brain metastasis ( recently started on chemotherapy- Taxotere )  who presents s/p seizure-like activity at chemotherapy session today. Today was the second dose of  chemotherapy and 5 minutes into session pt had an episode of tremulousness, LOC and bowel incontinence. Patient denies any aura prior to the episode.. He was given benadryl, solumedrol, pepcid and ativan during the episode on account of concern for possible reaction/sensitivity to chemotherapy.     Pt currently feels well and denies any acute c/o. He denies recent  fever/chills, n/v/d. He denies any confusion, headache, dizziness or  focal weakness. Per his wife pt is back to baseline mental status.   Of note this is patient's second seizure-like activity in the month while receiving Taxotere infusion.    ED course  VS : 141/80  133 16 O2 98% on 2L T 98.5F  Labs : wbc 23.6  h/h  10.8/31  plt 351  band neutrophils 15  Na 125 Cl 88 bun/CR 12/0.66 Phos 2 Mg 1.7    Treatment : Cefepime 1g IVPB x 1,Vancomycin 1g IVPB x 1, IVF NS 1L x 2  Seen by Neurology (2018 18:25)    SUBJECTIVE / OVERNIGHT EVENTS: [place]  used by Dr. Ackerman  Pt has improved abdominal/rib pain. No HA. No N/V. Ambulating, eating, using bathroom    MEDICATIONS  (STANDING):  benzonatate 100 milliGRAM(s) Oral three times a day  docusate sodium 100 milliGRAM(s) Oral three times a day  enoxaparin Injectable 40 milliGRAM(s) SubCutaneous daily  gabapentin 300 milliGRAM(s) Oral three times a day  HYDROcodone/homatropine Syrup 5 milliLiter(s) Oral every 6 hours  methadone    Tablet 10 milliGRAM(s) Oral three times a day  senna 2 Tablet(s) Oral at bedtime  sodium chloride 1 Gram(s) Oral two times a day    MEDICATIONS  (PRN):  acetaminophen   Tablet 650 milliGRAM(s) Oral every 6 hours PRN Mild Pain (1 - 3)  ondansetron    Tablet 4 milliGRAM(s) Oral every 8 hours PRN for nausea  oxyCODONE    IR 10 milliGRAM(s) Oral every 4 hours PRN Moderate Pain (4 - 6)  oxyCODONE    IR 15 milliGRAM(s) Oral every 4 hours PRN Severe Pain (7 - 10)      Vital Signs Last 24 Hrs  T(C): 36.9 (10 Porfirio 2018 12:45), Max: 36.9 (10 Porfirio 2018 04:50)  T(F): 98.4 (10 Porfirio 2018 12:45), Max: 98.4 (10 Porfirio 2018 04:50)  HR: 93 (10 Porfirio 2018 12:45) (93 - 94)  BP: 112/70 (10 Porfirio 2018 12:45) (112/70 - 127/81)  BP(mean): --  RR: 18 (10 Porfirio 2018 12:45) (18 - 20)  SpO2: 96% (10 Porfirio 2018 12:45) (94% - 96%)  CAPILLARY BLOOD GLUCOSE        I&O's Summary    2018 07:01  -  10 Porfirio 2018 07:00  --------------------------------------------------------  IN: 1400 mL / OUT: 1250 mL / NET: 150 mL    10 Porfirio 2018 07:01  -  10 Porfirio 2018 16:49  --------------------------------------------------------  IN: 360 mL / OUT: 360 mL / NET: 0 mL    PHYSICAL EXAM  GENERAL: NAD, well-developed but thin  EYES: conjunctiva and sclera clear  NECK: Supple, No JVD  ENT: MMM  CHEST/LUNG: Clear to auscultation bilaterally; No wheeze  HEART: Regular rate and rhythm; No murmurs  ABDOMEN: Soft, Nontender, Nondistended; Bowel sounds present  EXTREMITIES:  2+ Peripheral Pulses, No clubbing, cyanosis, or edema  NEURO: nonfocal, AOx3  PSYCH: calm   SKIN: No rashes or lesions    LABS:                        11.0   12.6  )-----------( 328      ( 10 Porfirio 2018 06:51 )             32.7     01-10    127<L>  |  88<L>  |  12  ----------------------------<  92  4.2   |  29  |  0.74    Ca    9.5      10 Porfirio 2018 06:51  Phos  4.1     01-10  Mg     1.8     01-10            Urinalysis Basic - ( 2018 04:04 )    Color: PL Yellow / Appearance: Clear / S.013 / pH: x  Gluc: x / Ketone: Negative  / Bili: Negative / Urobili: Negative   Blood: x / Protein: Negative / Nitrite: Negative   Leuk Esterase: Negative / RBC: 0-2 /HPF / WBC 0-2 /HPF   Sq Epi: x / Non Sq Epi: x / Bacteria: Negative /HPF        Culture - Urine (collected 2018 08:38)  Source: .Urine Clean Catch (Midstream)  Final Report (10 Porfirio 2018 13:34):    No growth    Culture - Blood (collected 2018 22:15)  Source: .Blood Blood  Preliminary Report (2018 23:01):    No growth to date.    Culture - Blood (collected 2018 22:15)  Source: .Blood Blood  Preliminary Report (2018 23:01):    No growth to date.        RADIOLOGY & ADDITIONAL TESTS:    Imaging Personally Reviewed:  Consultant(s) Notes Reviewed:  neuro  Care Discussed with Consultants/Other Providers: Medicine Accept Note  Patient is a 47y old  Male who presents with a chief complaint of seizure (2018 18:25)    HPI:  47M with h/o  NSCLC s/p VATS, with brain metastasis ( recently started on chemotherapy- Taxotere )  who presents s/p seizure-like activity at chemotherapy session today. Today was the second dose of  chemotherapy and 5 minutes into session pt had an episode of tremulousness, LOC and bowel incontinence. Patient denies any aura prior to the episode.. He was given benadryl, solumedrol, pepcid and ativan during the episode on account of concern for possible reaction/sensitivity to chemotherapy.     Pt currently feels well and denies any acute c/o. He denies recent  fever/chills, n/v/d. He denies any confusion, headache, dizziness or  focal weakness. Per his wife pt is back to baseline mental status.   Of note this is patient's second seizure-like activity in the month while receiving Taxotere infusion.    ED course  VS : 141/80  133 16 O2 98% on 2L T 98.5F  Labs : wbc 23.6  h/h  10.8/31  plt 351  band neutrophils 15  Na 125 Cl 88 bun/CR 12/0.66 Phos 2 Mg 1.7    Treatment : Cefepime 1g IVPB x 1,Vancomycin 1g IVPB x 1, IVF NS 1L x 2  Seen by Neurology (2018 18:25)    SUBJECTIVE / OVERNIGHT EVENTS: [place]  used by Dr. Ackerman  Pt has improved abdominal/rib pain. No HA. No N/V. Ambulating, eating, using bathroom    MEDICATIONS  (STANDING):  benzonatate 100 milliGRAM(s) Oral three times a day  docusate sodium 100 milliGRAM(s) Oral three times a day  enoxaparin Injectable 40 milliGRAM(s) SubCutaneous daily  gabapentin 300 milliGRAM(s) Oral three times a day  HYDROcodone/homatropine Syrup 5 milliLiter(s) Oral every 6 hours  methadone    Tablet 10 milliGRAM(s) Oral three times a day  senna 2 Tablet(s) Oral at bedtime  sodium chloride 1 Gram(s) Oral two times a day    MEDICATIONS  (PRN):  acetaminophen   Tablet 650 milliGRAM(s) Oral every 6 hours PRN Mild Pain (1 - 3)  ondansetron    Tablet 4 milliGRAM(s) Oral every 8 hours PRN for nausea  oxyCODONE    IR 10 milliGRAM(s) Oral every 4 hours PRN Moderate Pain (4 - 6)  oxyCODONE    IR 15 milliGRAM(s) Oral every 4 hours PRN Severe Pain (7 - 10)      Vital Signs Last 24 Hrs  T(C): 36.9 (10 Porfirio 2018 12:45), Max: 36.9 (10 Porfirio 2018 04:50)  T(F): 98.4 (10 Porfirio 2018 12:45), Max: 98.4 (10 Porfirio 2018 04:50)  HR: 93 (10 Porfirio 2018 12:45) (93 - 94)  BP: 112/70 (10 Porfirio 2018 12:45) (112/70 - 127/81)  BP(mean): --  RR: 18 (10 Porfirio 2018 12:45) (18 - 20)  SpO2: 96% (10 Porfirio 2018 12:45) (94% - 96%)  CAPILLARY BLOOD GLUCOSE        I&O's Summary    2018 07:01  -  10 Porfirio 2018 07:00  --------------------------------------------------------  IN: 1400 mL / OUT: 1250 mL / NET: 150 mL    10 Porfirio 2018 07:01  -  10 Porfirio 2018 16:49  --------------------------------------------------------  IN: 360 mL / OUT: 360 mL / NET: 0 mL    PHYSICAL EXAM  GENERAL: NAD, well-developed but thin  EYES: conjunctiva and sclera clear  NECK: Supple, No JVD  ENT: MMM  CHEST/LUNG: Clear to auscultation bilaterally; No wheeze. 2 x 2 cm scar s/p vats months ago well healed over left hemithorax.  HEART: Regular rate and rhythm; No murmurs  ABDOMEN: Soft, Nontender, Nondistended; Bowel sounds present  EXTREMITIES:  2+ Peripheral Pulses, No clubbing, cyanosis, or edema  NEURO: nonfocal, AOx3  PSYCH: calm   SKIN: No rashes or lesions    LABS:                        11.0   12.6  )-----------( 328      ( 10 Porfirio 2018 06:51 )             32.7     01-10    127<L>  |  88<L>  |  12  ----------------------------<  92  4.2   |  29  |  0.74    Ca    9.5      10 Porfirio 2018 06:51  Phos  4.1     01-10  Mg     1.8     10    Urinalysis Basic - ( 2018 04:04 )    Color: PL Yellow / Appearance: Clear / S.013 / pH: x  Gluc: x / Ketone: Negative  / Bili: Negative / Urobili: Negative   Blood: x / Protein: Negative / Nitrite: Negative   Leuk Esterase: Negative / RBC: 0-2 /HPF / WBC 0-2 /HPF   Sq Epi: x / Non Sq Epi: x / Bacteria: Negative /HPF        Culture - Urine (collected 2018 08:38)  Source: .Urine Clean Catch (Midstream)  Final Report (10 Porfirio 2018 13:34):    No growth    Culture - Blood (collected 2018 22:15)  Source: .Blood Blood  Preliminary Report (2018 23:01):    No growth to date.    Culture - Blood (collected 2018 22:15)  Source: .Blood Blood  Preliminary Report (2018 23:01):    No growth to date.      RADIOLOGY & ADDITIONAL TESTS:    MRI pending.    Imaging Personally Reviewed:  Consultant(s) Notes Reviewed:  neuro  Care Discussed with Consultants/Other Providers:

## 2018-01-10 NOTE — PROGRESS NOTE ADULT - PROBLEM SELECTOR PLAN 4
Likely due to SIADH   1 liter fluid restriction, add salt tabs - may need evaluation for vaptan therapy - Likely due to SIADH   1 liter fluid restriction, add salt tabs - may need evaluation for vaptan therapy - Likely due to SIADH   - Oncology would prefer to hold off on fluid restriction, add salt tabs. No indication for additional pharm intervention in light of chronic and stable hyponatremia.  - ctm lytes.

## 2018-01-10 NOTE — PROGRESS NOTE ADULT - SUBJECTIVE AND OBJECTIVE BOX
reviewed EEG, normal    Given low suspicion for seizure, and now normal EEG, would DC Keppra. Patient can folow up with Dr, nissenbaum [phone]

## 2018-01-10 NOTE — PROGRESS NOTE ADULT - ASSESSMENT
47M with h/o  NSCLC s/p VATS, with brain metastasis ( recently started on chemotherapy - Taxotere)  who presents s/p seizure-like activity at chemotherapy session today. Physical exam is notable for cachexia. Labs are notable for marked leukocytosis and bandemia. Pt also has some hyponatremia. CT brain shows previously seen metastasis and no acute infarct or hemorrhage. 47M with h/o  NSCLC s/p VATS, with brain metastasis ( recently started on chemotherapy - Taxotere)  who presents s/p seizure-like activity at chemotherapy session on 1/9. Physical exam is notable for cachexia. Labs are notable for marked leukocytosis and bandemia. Pt also has some hyponatremia. CT brain shows previously seen metastasis and no acute infarct or hemorrhage. MRI pending.

## 2018-01-10 NOTE — PROGRESS NOTE ADULT - PROBLEM SELECTOR PLAN 6
Lovenox SC for DVT ppx Lovenox SC for DVT ppx    Jean-Pierre Ackerman MD  [facility] Model A  Pager # [phone]  Please page #[phone] if after 7:00 PM on weekdays. Please page covering pager after 12 PM on weekends.

## 2018-01-10 NOTE — PROGRESS NOTE ADULT - PROBLEM SELECTOR PLAN 1
likely 2/2 brain mets w/superimposed adverse reaction to Taxotere infusion, possibly with contribution of hyponatremia  Neurology recs appreciated - d/c Keppra  EEG unremarkable  Fall precautions  f/u MRI brain - likely 2/2 brain mets w/superimposed adverse reaction to Taxotere infusion, possibly with contribution of hyponatremia; hyponatremia is chronic in nature and not likely to be solely responsible for new onset seizures.  - Neurology recs appreciated - d/c Keppra  - EEG unremarkable  - Fall precautions  - f/u MRI brain

## 2018-01-10 NOTE — EEG REPORT - NS EEG TEXT BOX
[facility]   COMPREHENSIVE EPILEPSY CENTER   REPORT OF ROUTINE VIDEO EEG     [facility]: [address] [place] [address], [address], [place] [address], Ph#: [phone]  [facility]: [id number], [place] [address], Ph#: [phone]  Office: [address] [facility] [place], [facility] [address], [place] [address] Ph#: [phone]    Patient Name: TREV RUIZ  Age and : 47y ([birth year])  MRN #: [id number]  Location: [facility] 5MON 512 D1    Study Date: 18    _____________________________________________________________  TECHNICAL INFORMATION    EEG Placement and Labeling of Electrodes:  The EEG was performed utilizing 20 channels referential EEG connections (coronal over temporal over parasagittal montage) using all standard 10-20 electrode placements with EKG.  Recording was at a sampling rate of 256 samples per second per channel.  Time synchronized digital video recording was done simultaneously with EEG recording.  A low light infrared camera was used for low light recording.  Phillip and seizure detection algorithms were utilized.    _____________________________________________________________  HISTORY:  Patient is a 47y old  Male who presents with a chief complaint of seizure (2018 18:25)    PERTINENT MEDICATION:  gabapentin 300 milliGRAM(s) Oral three times a day  levETIRAcetam  IVPB 500 milliGRAM(s) IV Intermittent every 12 hours    _____________________________________________________________  STUDY INTERPRETATION    Background:  The background was continuous, spontaneously variable, reactive, and predominantly consisted of alpha activity. Low amplitude frontal beta was noted in wakefulness.  During wakefulness, the posteriorly dominant rhythm consisted of symmetric, well-modulated 10-11 Hz activity, with an amplitude to 40 uV, that attenuated to eye opening.      Sleep:  - Drowsiness was characterized by fragmentation, attenuation, and slowing of the background activity.    - Sleep was characterized by the presence of symmetric and normal symmetric spindles and K-complexes.    Non-epileptiform Paroxysmal Abnormalities:  None    Interictal Epileptiform Abnormalities:   None    Ictal Abnormalities:   No clinical events.  No electrographic seizures.    Activation Procedures:   Hyperventilation was not performed.    Photic stimulation was not performed.     Artifacts:  Intermittent myogenic and movement artifacts were noted.    ECG:  The heart rate on single channel ECG was predominantly between  BPM.    _____________________________________________________________  EEG CLASSIFICATION/SUMMARY:    Normal EEG in the awake, drowsy, and asleep states.    _____________________________________________________________  CLINICAL IMPRESSION:    Normal EEG study in the awake, drowsy, and asleep states.  No epileptic pattern or seizure seen.      Nichole Ortiz MD  Attending Physician, [facility] Epilepsy [place]

## 2018-01-11 ENCOUNTER — TRANSCRIPTION ENCOUNTER (OUTPATIENT)
Age: 48
End: 2018-01-11

## 2018-01-11 ENCOUNTER — APPOINTMENT (OUTPATIENT)
Dept: HEMATOLOGY ONCOLOGY | Facility: CLINIC | Age: 48
End: 2018-01-11

## 2018-01-11 DIAGNOSIS — T45.1X5A ADVERSE EFFECT OF ANTINEOPLASTIC AND IMMUNOSUPPRESSIVE DRUGS, INITIAL ENCOUNTER: ICD-10-CM

## 2018-01-11 LAB
ANION GAP SERPL CALC-SCNC: 11 MMOL/L — SIGNIFICANT CHANGE UP (ref 5–17)
BUN SERPL-MCNC: 11 MG/DL — SIGNIFICANT CHANGE UP (ref 7–23)
CALCIUM SERPL-MCNC: 8.8 MG/DL — SIGNIFICANT CHANGE UP (ref 8.4–10.5)
CHLORIDE SERPL-SCNC: 88 MMOL/L — LOW (ref 96–108)
CO2 SERPL-SCNC: 28 MMOL/L — SIGNIFICANT CHANGE UP (ref 22–31)
CREAT SERPL-MCNC: 0.61 MG/DL — SIGNIFICANT CHANGE UP (ref 0.5–1.3)
GLUCOSE SERPL-MCNC: 110 MG/DL — HIGH (ref 70–99)
HCT VFR BLD CALC: 32 % — LOW (ref 39–50)
HGB BLD-MCNC: 10.8 G/DL — LOW (ref 13–17)
MCHC RBC-ENTMCNC: 31.2 PG — SIGNIFICANT CHANGE UP (ref 27–34)
MCHC RBC-ENTMCNC: 33.8 GM/DL — SIGNIFICANT CHANGE UP (ref 32–36)
MCV RBC AUTO: 92.4 FL — SIGNIFICANT CHANGE UP (ref 80–100)
PLATELET # BLD AUTO: 273 K/UL — SIGNIFICANT CHANGE UP (ref 150–400)
POTASSIUM SERPL-MCNC: 4.4 MMOL/L — SIGNIFICANT CHANGE UP (ref 3.5–5.3)
POTASSIUM SERPL-SCNC: 4.4 MMOL/L — SIGNIFICANT CHANGE UP (ref 3.5–5.3)
RBC # BLD: 3.46 M/UL — LOW (ref 4.2–5.8)
RBC # FLD: 16.1 % — HIGH (ref 10.3–14.5)
SODIUM SERPL-SCNC: 127 MMOL/L — LOW (ref 135–145)
WBC # BLD: 12.9 K/UL — HIGH (ref 3.8–10.5)
WBC # FLD AUTO: 12.9 K/UL — HIGH (ref 3.8–10.5)

## 2018-01-11 PROCEDURE — 99233 SBSQ HOSP IP/OBS HIGH 50: CPT | Mod: GC

## 2018-01-11 PROCEDURE — 99232 SBSQ HOSP IP/OBS MODERATE 35: CPT

## 2018-01-11 RX ORDER — GABAPENTIN 400 MG/1
1 CAPSULE ORAL
Qty: 0 | Refills: 0 | COMMUNITY
Start: 2018-01-11

## 2018-01-11 RX ORDER — OXYCODONE HYDROCHLORIDE 5 MG/1
1 TABLET ORAL
Qty: 0 | Refills: 0 | COMMUNITY
Start: 2018-01-11

## 2018-01-11 RX ORDER — SODIUM CHLORIDE 9 MG/ML
1 INJECTION INTRAMUSCULAR; INTRAVENOUS; SUBCUTANEOUS
Qty: 0 | Refills: 0 | COMMUNITY
Start: 2018-01-11

## 2018-01-11 RX ORDER — METHADONE HYDROCHLORIDE 40 MG/1
1 TABLET ORAL
Qty: 0 | Refills: 0 | COMMUNITY
Start: 2018-01-11

## 2018-01-11 RX ADMIN — SODIUM CHLORIDE 1 GRAM(S): 9 INJECTION INTRAMUSCULAR; INTRAVENOUS; SUBCUTANEOUS at 17:53

## 2018-01-11 RX ADMIN — OXYCODONE HYDROCHLORIDE 10 MILLIGRAM(S): 5 TABLET ORAL at 04:57

## 2018-01-11 RX ADMIN — METHADONE HYDROCHLORIDE 10 MILLIGRAM(S): 40 TABLET ORAL at 23:11

## 2018-01-11 RX ADMIN — SODIUM CHLORIDE 1 GRAM(S): 9 INJECTION INTRAMUSCULAR; INTRAVENOUS; SUBCUTANEOUS at 04:56

## 2018-01-11 RX ADMIN — OXYCODONE HYDROCHLORIDE 10 MILLIGRAM(S): 5 TABLET ORAL at 12:49

## 2018-01-11 RX ADMIN — METHADONE HYDROCHLORIDE 10 MILLIGRAM(S): 40 TABLET ORAL at 16:07

## 2018-01-11 RX ADMIN — Medication 100 MILLIGRAM(S): at 21:02

## 2018-01-11 RX ADMIN — OXYCODONE HYDROCHLORIDE 10 MILLIGRAM(S): 5 TABLET ORAL at 19:29

## 2018-01-11 RX ADMIN — METHADONE HYDROCHLORIDE 10 MILLIGRAM(S): 40 TABLET ORAL at 08:00

## 2018-01-11 RX ADMIN — OXYCODONE HYDROCHLORIDE 10 MILLIGRAM(S): 5 TABLET ORAL at 05:30

## 2018-01-11 RX ADMIN — OXYCODONE HYDROCHLORIDE 10 MILLIGRAM(S): 5 TABLET ORAL at 20:00

## 2018-01-11 RX ADMIN — SENNA PLUS 2 TABLET(S): 8.6 TABLET ORAL at 21:02

## 2018-01-11 RX ADMIN — ENOXAPARIN SODIUM 40 MILLIGRAM(S): 100 INJECTION SUBCUTANEOUS at 11:21

## 2018-01-11 RX ADMIN — GABAPENTIN 300 MILLIGRAM(S): 400 CAPSULE ORAL at 08:00

## 2018-01-11 RX ADMIN — Medication 100 MILLIGRAM(S): at 04:57

## 2018-01-11 RX ADMIN — Medication 100 MILLIGRAM(S): at 14:47

## 2018-01-11 RX ADMIN — OXYCODONE HYDROCHLORIDE 10 MILLIGRAM(S): 5 TABLET ORAL at 13:48

## 2018-01-11 RX ADMIN — GABAPENTIN 300 MILLIGRAM(S): 400 CAPSULE ORAL at 16:07

## 2018-01-11 RX ADMIN — GABAPENTIN 300 MILLIGRAM(S): 400 CAPSULE ORAL at 23:11

## 2018-01-11 NOTE — PROGRESS NOTE ADULT - SUBJECTIVE AND OBJECTIVE BOX
Patient is a 47y old  Male who presents with a chief complaint of seizure (08 Jan 2018 18:25)      SUBJECTIVE / OVERNIGHT EVENTS:    MEDICATIONS  (STANDING):  benzonatate 100 milliGRAM(s) Oral three times a day  docusate sodium 100 milliGRAM(s) Oral three times a day  enoxaparin Injectable 40 milliGRAM(s) SubCutaneous daily  gabapentin 300 milliGRAM(s) Oral three times a day  HYDROcodone/homatropine Syrup 5 milliLiter(s) Oral every 6 hours  methadone    Tablet 10 milliGRAM(s) Oral three times a day  senna 2 Tablet(s) Oral at bedtime  sodium chloride 1 Gram(s) Oral two times a day    MEDICATIONS  (PRN):  acetaminophen   Tablet 650 milliGRAM(s) Oral every 6 hours PRN Mild Pain (1 - 3)  ondansetron    Tablet 4 milliGRAM(s) Oral every 8 hours PRN for nausea  oxyCODONE    IR 10 milliGRAM(s) Oral every 4 hours PRN Moderate Pain (4 - 6)  oxyCODONE    IR 15 milliGRAM(s) Oral every 4 hours PRN Severe Pain (7 - 10)      CAPILLARY BLOOD GLUCOSE        I&O's Summary    10 Porfirio 2018 07:01  -  11 Jan 2018 07:00  --------------------------------------------------------  IN: 840 mL / OUT: 660 mL / NET: 180 mL        T(C): 37.1 (01-11-18 @ 04:51), Max: 37.1 (01-10-18 @ 22:40)  HR: 114 (01-11-18 @ 04:51) (93 - 114)  BP: 123/69 (01-11-18 @ 04:51) (104/68 - 123/69)  RR: 20 (01-11-18 @ 04:51) (18 - 20)  SpO2: 93% (01-11-18 @ 04:51) (93% - 96%)    PHYSICAL EXAM:  GENERAL: NAD, well-developed  HEAD:  Atraumatic, Normocephalic  EYES: EOMI, PERRLA, conjunctiva and sclera clear  NECK: Supple, No JVD  CHEST/LUNG: Clear to auscultation bilaterally; No wheeze  HEART: Regular rate and rhythm; No murmurs, rubs, or gallops  ABDOMEN: Soft, Nontender, Nondistended; Bowel sounds present  EXTREMITIES:  2+ Peripheral Pulses, No clubbing, cyanosis, or edema  PSYCH: AAOx3  NEUROLOGY: non-focal  SKIN: No rashes or lesions    LABS:                        11.0   12.6  )-----------( 328      ( 10 Porfirio 2018 06:51 )             32.7     WBC Trend: 12.6<--, 17.5<--, 23.6<--  01-10    127<L>  |  88<L>  |  12  ----------------------------<  92  4.2   |  29  |  0.74    Ca    9.5      10 Porfirio 2018 06:51  Phos  4.1     01-10  Mg     1.8     01-10      Creatinine Trend: 0.74<--, 0.66<--, 0.66<--, 0.66<--, 0.65<--, 0.53<--              RADIOLOGY & ADDITIONAL TESTS:    Imaging Personally Reviewed:    Consultant(s) Notes Reviewed:      Care Discussed with Consultants/Other Providers: Patient is a 47y old  Male who presents with a chief complaint of seizure (08 Jan 2018 18:25)    SUBJECTIVE / OVERNIGHT EVENTS:    No overnight events.    [employer] used for translating Chinese language speaking only.    Patient states that his pain is persistent and 6-7 on the left hemithorax. Cough is stable from baseline, but worsens his pain.     Denies fever, chills, night sweats, abdominal pain, constipation, diarrhea, constipation, headache, vision changes, neck or back pain.    MEDICATIONS  (STANDING):  benzonatate 100 milliGRAM(s) Oral three times a day  docusate sodium 100 milliGRAM(s) Oral three times a day  enoxaparin Injectable 40 milliGRAM(s) SubCutaneous daily  gabapentin 300 milliGRAM(s) Oral three times a day  HYDROcodone/homatropine Syrup 5 milliLiter(s) Oral every 6 hours  methadone    Tablet 10 milliGRAM(s) Oral three times a day  senna 2 Tablet(s) Oral at bedtime  sodium chloride 1 Gram(s) Oral two times a day    MEDICATIONS  (PRN):  acetaminophen   Tablet 650 milliGRAM(s) Oral every 6 hours PRN Mild Pain (1 - 3)  ondansetron    Tablet 4 milliGRAM(s) Oral every 8 hours PRN for nausea  oxyCODONE    IR 10 milliGRAM(s) Oral every 4 hours PRN Moderate Pain (4 - 6)  oxyCODONE    IR 15 milliGRAM(s) Oral every 4 hours PRN Severe Pain (7 - 10)    CAPILLARY BLOOD GLUCOSE    I&O's Summary    10 Porfirio 2018 07:01  -  11 Jan 2018 07:00  --------------------------------------------------------  IN: 840 mL / OUT: 660 mL / NET: 180 mL        T(C): 37.1 (01-11-18 @ 04:51), Max: 37.1 (01-10-18 @ 22:40)  HR: 114 (01-11-18 @ 04:51) (93 - 114)  BP: 123/69 (01-11-18 @ 04:51) (104/68 - 123/69)  RR: 20 (01-11-18 @ 04:51) (18 - 20)  SpO2: 93% (01-11-18 @ 04:51) (93% - 96%)    PHYSICAL EXAM  GENERAL: NAD, well-developed but thin  EYES: conjunctiva and sclera clear  NECK: Supple, No JVD  ENT: MMM  CHEST/LUNG: Clear to auscultation bilaterally; No wheeze. 2 x 2 cm scar s/p vats months ago well healed over left hemithorax.  HEART: Regular rate and rhythm; No murmurs  ABDOMEN: Soft, Nontender, Nondistended; Bowel sounds present  EXTREMITIES:  2+ Peripheral Pulses, No clubbing, cyanosis, or edema  NEURO: nonfocal, AOx3  PSYCH: calm   SKIN: No rashes or lesions    LABS:                        11.0   12.6  )-----------( 328      ( 10 Porfirio 2018 06:51 )             32.7     WBC Trend: 12.6<--, 17.5<--, 23.6<--  01-10    127<L>  |  88<L>  |  12  ----------------------------<  92  4.2   |  29  |  0.74    Ca    9.5      10 Porfirio 2018 06:51  Phos  4.1     01-10  Mg     1.8     01-10      Creatinine Trend: 0.74<--, 0.66<--, 0.66<--, 0.66<--, 0.65<--, 0.53<--    RADIOLOGY & ADDITIONAL TESTS:    < from: MR Brain Stereotactic w/wo IV Cont (01.10.18 @ 22:14) >  FINDINGS:  Stable 2 mm focus of enhancement in the left anterior frontal   lobe.    Decreased size of previously seen focus of enhancement approximating the   posterior aspect of the superior frontal sulcus, currently measuring 3 mm   in size, previously measuring 5 mm. Decreased surrounding vasogenic edema.    No new foci of abnormal parenchymal enhancement. No abnormal   leptomeningeal enhancement.    No hydrocephalus, midline shift, large mass effect, acute intracranial   hemorrhage, or acute infarct. Signal voids are seen within the major   intracranial vessels consistent with their patency.    The visualized paranasal sinuses and mastoid air cells are clear. The   orbits, sellar and suprasellar structures, and craniocervical junction   are unremarkable.    IMPRESSION:    Stable 2 mm focus of enhancement in the left anterior frontal lobe.    Decreased size of previously seen focus of enhancement approximating the   posterior aspect of the superior frontal sulcus, currently measuring 3 mm   in size, previously measuring 5 mm. Decreased surrounding vasogenic edema.    No new foci of abnormal parenchymal enhancement. No abnormal   leptomeningeal enhancement.    Continued MRI surveillance recommended.    < end of copied text >      Imaging Personally Reviewed:    Consultant(s) Notes Reviewed:      Care Discussed with Consultants/Other Providers:

## 2018-01-11 NOTE — DISCHARGE NOTE ADULT - PLAN OF CARE
Follow up with Dr. Hernandez after discharge You were admitted to the hospital after you had a reaction to your Chemotherapy regimen. Your oncology team followed during the admission and determined that you may need to be started on the next available medication for your cancer. Please ensure that going forward, for any doctor you see, that you have a reaction to this medications and should not take it. Follow up with PCP, and Dr. Hernandez after discharge. You were found to have low sodium in your blood that is well known to the oncology doctors. This is most likely secondary to your Cancer and is not something to worry about as it has remained stable. For now, you will continue with salt tablets to increase your sodium intake. Follow up with PCP, palliative care (pain medicine) after discharge. Your medications were delayed because your medications for pain were not accurately initially assessed. This has been fixed within our system to reflect what you are taking at home. If your current regimen is giving you side effects or not adequately controlling your pain you should follow with your PCP or pain doctor to better assess your pain needs. Follow up with Dr. Hernandez.

## 2018-01-11 NOTE — DISCHARGE NOTE ADULT - MEDICATION SUMMARY - MEDICATIONS TO CHANGE
I will SWITCH the dose or number of times a day I take the medications listed below when I get home from the hospital:    gabapentin 100 mg oral capsule  -- 2 cap(s) by mouth every 8 hours, As Needed  -for severe pain

## 2018-01-11 NOTE — DISCHARGE NOTE ADULT - CARE PROVIDER_API CALL
Isatu Gagnon (DO), HematologyOncology; Internal Medicine  [address] [place] [address]  Phone: [phone]  Fax: [phone]

## 2018-01-11 NOTE — PROGRESS NOTE ADULT - PROBLEM SELECTOR PLAN 5
-on chemotherapy  -continue f/up with Oncology  -Oxycontin / Oxycodone PRN / methadone for pain control Lovenox SC for DVT ppx    Jean-Pierre Ackerman MD  [facility] Care Model [facility]  Pager # [phone]  Please page #[phone] if after 7:00 PM on weekdays. Please page covering pager after 12 PM on weekends.

## 2018-01-11 NOTE — PROGRESS NOTE ADULT - PROBLEM SELECTOR PLAN 4
- Likely due to SIADH   - Oncology would prefer to hold off on fluid restriction, add salt tabs. No indication for additional pharm intervention in light of chronic and stable hyponatremia.  - ctm lytes. -on chemotherapy  -continue f/up with Oncology  -Oxycontin / Oxycodone PRN / methadone for pain control  - ctm pain overnight.

## 2018-01-11 NOTE — PROGRESS NOTE ADULT - PROBLEM SELECTOR PLAN 1
- likely 2/2 brain mets w/superimposed adverse reaction to Taxotere infusion, possibly with contribution of hyponatremia; hyponatremia is chronic in nature and not likely to be solely responsible for new onset seizures.  - Neurology recs appreciated - d/c Keppra  - EEG unremarkable  - Fall precautions  - f/u MRI brain -Adverse reaction to Taxotere infusion, No seizure activity noted on EEG.   - Neurology recs appreciated - d/c Keppra  - EEG unremarkable  - Fall precautions  - f/u MRI brain  - No more Taxotere to be given. Plan to follow with OP oncologist after discharge and change to 2nd line regimen.

## 2018-01-11 NOTE — PROGRESS NOTE ADULT - PROBLEM SELECTOR PLAN 6
Lovenox SC for DVT ppx    Jean-Pierre Ackerman MD  [facility]  Pager # [phone]  Please page #[phone] if after 7:00 PM on weekdays. Please page covering pager after 12 PM on weekends.

## 2018-01-11 NOTE — PROGRESS NOTE ADULT - PROBLEM SELECTOR PLAN 3
- resolved, off abx, cultures negative  - monitor vitals. - Likely due to SIADH   - Oncology would prefer to hold off on fluid restriction, add salt tabs. No indication for additional pharm intervention in light of chronic and stable hyponatremia.  - ctm lytes.

## 2018-01-11 NOTE — DISCHARGE NOTE ADULT - HOSPITAL COURSE
47M with h/o  NSCLC s/p VATS, with brain metastasis ( recently started on chemotherapy- Taxotere )  who presents s/p seizure-like activity at chemotherapy session today. Today was the second dose of  chemotherapy and 5 minutes into session pt had an episode of tremulousness, LOC and bowel incontinence. Patient denies any aura prior to the episode.. He was given benadryl, solumedrol, pepcid and ativan during the episode on account of concern for possible reaction/sensitivity to chemotherapy.     Pt currently feels well and denies any acute c/o. He denies recent  fever/chills, n/v/d. He denies any confusion, headache, dizziness or  focal weakness. Per his wife pt is back to baseline mental status. Of note this is patient's second seizure-like activity in the month while receiving Taxotere infusion.    ED course  VS : 141/80  133 16 O2 98% on 2L T 98.5F  Labs : wbc 23.6  h/h  10.8/31  plt 351  band neutrophils 15  Na 125 Cl 88 bun/CR 12/0.66 Phos 2 Mg 1.7    Treatment : Cefepime 1g IVPB x 1,Vancomycin 1g IVPB x 1, IVF NS 1L x 2  Seen by Neurology    Initially patient was upset and in pain. Use of  and on chart review shows that patient had missed a dose of his medication 2/2 "refusal", oncology was consulted for reaction and noted the error in his pain medications and they were adjusted appropriately. They recommended obtaining MRI brain w/ and w/o contrast to monitor metastatic disease. Keppra 500 mg bid was initiated by neurology and promptly discontinued after no evidence of seizure activity on EKG. Patient was noted to have no seizure history and did not have any seizure like activity during the admission. Leukocytosis improved to ___________ on discharge and bandemia was no longer present on day 2 of admission. Primary team d/c antibiotics on day 2 of admission as the patient did not have symptoms of ongoing infection. Remained afebrile and SIRS negative. Primary team consulted the advice of oncology for mild but chronic hyponatremia and they recommended initiating Salt tabs but to continue with fluids as patient desired. On the day of discharge patient was ___________________ 47M with h/o  NSCLC s/p VATS, with brain metastasis ( recently started on chemotherapy- Taxotere )  who presents s/p seizure-like activity at chemotherapy session today. Today was the second dose of  chemotherapy and 5 minutes into session pt had an episode of tremulousness, LOC and bowel incontinence. Patient denies any aura prior to the episode.. He was given benadryl, solumedrol, pepcid and ativan during the episode on account of concern for possible reaction/sensitivity to chemotherapy.     Pt currently feels well and denies any acute c/o. He denies recent  fever/chills, n/v/d. He denies any confusion, headache, dizziness or  focal weakness. Per his wife pt is back to baseline mental status. Of note this is patient's second seizure-like activity in the month while receiving Taxotere infusion.    ED course  VS : 141/80  133 16 O2 98% on 2L T 98.5F  Labs : wbc 23.6  h/h  10.8/31  plt 351  band neutrophils 15  Na 125 Cl 88 bun/CR 12/0.66 Phos 2 Mg 1.7    Treatment : Cefepime 1g IVPB x 1,Vancomycin 1g IVPB x 1, IVF NS 1L x 2  Seen by Neurology    Initially patient was upset and in pain. Use of  and on chart review shows that patient had missed a dose of his medication 2/2 "refusal", oncology was consulted for reaction and noted the error in his pain medications and they were adjusted appropriately. They recommended obtaining MRI brain w/ and w/o contrast to monitor metastatic disease. Keppra 500 mg bid was initiated by neurology and promptly discontinued after no evidence of seizure activity on EKG. Patient was noted to have no seizure history and did not have any seizure like activity during the admission. Leukocytosis improved to 12.9 on discharge and bandemia was no longer present on day 2 of admission. Primary team d/c antibiotics on day 2 of admission as the patient did not have symptoms of ongoing infection. Remained afebrile and SIRS negative. Primary team consulted the advice of oncology for mild but chronic hyponatremia and they recommended initiating Salt tabs but to continue with fluids as patient desired. On the day of discharge patient was comfortable and under good pain control on home meds. Oncology appointment scheduled as above. 47M with h/o  NSCLC s/p VATS, with brain metastasis ( recently started on chemotherapy- Taxotere )  who presents s/p seizure-like activity at chemotherapy session today. Today was the second dose of  chemotherapy and 5 minutes into session pt had an episode of tremulousness, LOC and bowel incontinence. Patient denies any aura prior to the episode.. He was given benadryl, solumedrol, pepcid and ativan during the episode on account of concern for possible reaction/sensitivity to chemotherapy.     Pt currently feels well and denies any acute c/o. He denies recent  fever/chills, n/v/d. He denies any confusion, headache, dizziness or  focal weakness. Per his wife pt is back to baseline mental status. Of note this is patient's second seizure-like activity in the month while receiving Taxotere infusion.    ED course  VS : 141/80  133 16 O2 98% on 2L T 98.5F  Labs : wbc 23.6  h/h  10.8/31  plt 351  band neutrophils 15  Na 125 Cl 88 bun/CR 12/0.66 Phos 2 Mg 1.7    Treatment : Cefepime 1g IVPB x 1,Vancomycin 1g IVPB x 1, IVF NS 1L x 2  Seen by Neurology    Initially patient was upset and in pain. Use of  and on chart review shows that patient had missed a dose of his medication 2/2 "refusal", oncology was consulted for reaction and noted the error in his pain medications and they were adjusted appropriately. They recommended obtaining MRI brain w/ and w/o contrast to monitor metastatic disease. Keppra 500 mg bid was initiated by neurology and promptly discontinued after no evidence of seizure activity on EKG. Patient was noted to have no seizure history and did not have any seizure like activity during the admission. Leukocytosis improved to 12.9 on discharge and bandemia was no longer present on day 2 of admission. Primary team d/c antibiotics on day 2 of admission as the patient did not have symptoms of ongoing infection. Remained afebrile and SIRS negative. Primary team consulted the advice of oncology for mild but chronic hyponatremia and they recommended initiating Salt tabs but to continue with fluids as patient desired. On the day of discharge patient was comfortable and under good pain control on home meds. Oncology appointment scheduled as above.     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

## 2018-01-11 NOTE — PROGRESS NOTE ADULT - PROBLEM SELECTOR PLAN 2
- metastatic lesions from NSCLC  - f/u MRI - metastatic lesions from NSCLC  - MRI as above with stable metastatic disease.

## 2018-01-11 NOTE — DISCHARGE NOTE ADULT - MEDICATION SUMMARY - MEDICATIONS TO TAKE
I will START or STAY ON the medications listed below when I get home from the hospital:    acetaminophen 325 mg oral tablet  -- 2 tab(s) by mouth every 6 hours, As needed, Mild Pain (1 - 3)  -- Indication: For Mild Pain    methadone 10 mg oral tablet  -- 1 tab(s) by mouth 3 times a day  -- Indication: For Chronic Pain    oxyCODONE 15 mg oral tablet  -- 1 tab(s) by mouth every 4 hours, As needed, Severe Pain (7 - 10)  -- Indication: For Severe Pain    oxyCODONE 10 mg oral tablet  -- 1 tab(s) by mouth every 4 hours, As needed, Moderate Pain (4 - 6)  -- Indication: For Moderate Pain    gabapentin 300 mg oral capsule  -- 1 cap(s) by mouth 3 times a day  -- Indication: For Chronic Pain    Zofran 4 mg oral tablet  -- 1 tab(s) by mouth every 8 hours, As Needed - for nausea  -- Indication: For Nausea/Vomit    benzonatate 100 mg oral capsule  -- 1 cap(s) by mouth 3 times a day  -- Indication: For Cough    docusate sodium 100 mg oral capsule  -- 1 cap(s) by mouth 3 times a day  -- Indication: For Constipation    senna oral tablet  -- 2 tab(s) by mouth once a day (at bedtime)  -- Indication: For Constipation    sodium chloride 1 g oral tablet  -- 1 tab(s) by mouth 2 times a day  -- Indication: For Hyponatremia    hydrocodone-homatropine 5 mg-1.5 mg/5 mL oral syrup  -- 5 milliliter(s) by mouth every 6 hours MDD:20 mg  -- Indication: For Cough

## 2018-01-11 NOTE — DISCHARGE NOTE ADULT - OTHER SIGNIFICANT FINDINGS
< from: MR Brain Stereotactic w/wo IV Cont (01.10.18 @ 22:14) >  FINDINGS:  Stable 2 mm focus of enhancement in the left anterior frontal   lobe.    Decreased size of previously seen focus of enhancement approximating the   posterior aspect of the superior frontal sulcus, currently measuring 3 mm   in size, previously measuring 5 mm. Decreased surrounding vasogenic edema.    No new foci of abnormal parenchymal enhancement. No abnormal   leptomeningeal enhancement.    No hydrocephalus, midline shift, large mass effect, acute intracranial   hemorrhage, or acute infarct. Signal voids are seen within the major   intracranial vessels consistent with their patency.    The visualized paranasal sinuses and mastoid air cells are clear. The   orbits, sellar and suprasellar structures, and craniocervical junction   are unremarkable.    IMPRESSION:    Stable 2 mm focus of enhancement in the left anterior frontal lobe.    Decreased size of previously seen focus of enhancement approximating the   posterior aspect of the superior frontal sulcus, currently measuring 3 mm   in size, previously measuring 5 mm. Decreased surrounding vasogenic edema.    No new foci of abnormal parenchymal enhancement. No abnormal   leptomeningeal enhancement.    Continued MRI surveillance recommended.    < end of copied text >

## 2018-01-11 NOTE — DISCHARGE NOTE ADULT - HOME CARE AGENCY
Home care referral made to [place] [phone]. Nurse will call the day after discharge for initial evaluation. Physical therapy to follow as soon as possible

## 2018-01-11 NOTE — DISCHARGE NOTE ADULT - PATIENT PORTAL LINK FT
“You can access the [facility] Patient Portal, offered by [facility], by registering with the following website: [email or web address]”

## 2018-01-11 NOTE — PROGRESS NOTE ADULT - ASSESSMENT
47M with h/o  NSCLC s/p VATS, with brain metastasis ( recently started on chemotherapy - Taxotere)  who presents s/p seizure-like activity at chemotherapy session on 1/9. Physical exam is notable for cachexia. Labs are notable for marked leukocytosis and bandemia. Pt also has some hyponatremia. CT brain shows previously seen metastasis and no acute infarct or hemorrhage. MRI pending. 47M with h/o  NSCLC s/p VATS, with brain metastasis ( recently started on chemotherapy - Taxotere)  who presents s/p seizure-like activity at chemotherapy session on 1/9. Physical exam is notable for cachexia. Labs are notable for marked leukocytosis and bandemia. Pt also has some hyponatremia. Cross sectional imaging brain with stable appearing mets and no acute changes.

## 2018-01-11 NOTE — PROGRESS NOTE ADULT - PROBLEM SELECTOR PLAN 1
d/c Taxotere. To f/u with Dr. Hernandez at [facility] next Thursday 1/18 at 10:40 am.   Pt to start new therapy next Thursday as well. Dr. Hernandez to discuss this as well.

## 2018-01-11 NOTE — DISCHARGE NOTE ADULT - MEDICATION SUMMARY - MEDICATIONS TO STOP TAKING
I will STOP taking the medications listed below when I get home from the hospital:    oxyCODONE 40 mg oral tablet, extended release  -- 1 tab(s) by mouth every 12 hours    oxyCODONE 5 mg oral tablet  -- 1 tab(s) by mouth every 3 hours, As Needed for moderate to severe pain

## 2018-01-11 NOTE — PROGRESS NOTE ADULT - SUBJECTIVE AND OBJECTIVE BOX
Chief Complaint: met lung ca    INTERVAL HPI/OVERNIGHT EVENTS: Pain well controlled today. No further seizures. Interpret phone used.     MEDICATIONS  (STANDING):  benzonatate 100 milliGRAM(s) Oral three times a day  docusate sodium 100 milliGRAM(s) Oral three times a day  enoxaparin Injectable 40 milliGRAM(s) SubCutaneous daily  gabapentin 300 milliGRAM(s) Oral three times a day  HYDROcodone/homatropine Syrup 5 milliLiter(s) Oral every 6 hours  methadone    Tablet 10 milliGRAM(s) Oral three times a day  senna 2 Tablet(s) Oral at bedtime  sodium chloride 1 Gram(s) Oral two times a day    MEDICATIONS  (PRN):  acetaminophen   Tablet 650 milliGRAM(s) Oral every 6 hours PRN Mild Pain (1 - 3)  ondansetron    Tablet 4 milliGRAM(s) Oral every 8 hours PRN for nausea  oxyCODONE    IR 10 milliGRAM(s) Oral every 4 hours PRN Moderate Pain (4 - 6)  oxyCODONE    IR 15 milliGRAM(s) Oral every 4 hours PRN Severe Pain (7 - 10)      Allergies    ampicillin (Rash)    Intolerances        ROS: as above     Vital Signs Last 24 Hrs  T(C): 36.6 (11 Jan 2018 12:45), Max: 37.1 (10 Porfirio 2018 22:40)  T(F): 97.9 (11 Jan 2018 12:45), Max: 98.8 (10 Porfirio 2018 22:40)  HR: 105 (11 Jan 2018 12:45) (99 - 114)  BP: 128/86 (11 Jan 2018 12:45) (104/68 - 128/86)  BP(mean): --  RR: 16 (11 Jan 2018 12:45) (16 - 20)  SpO2: 95% (11 Jan 2018 12:45) (93% - 95%)    Physical Exam:   AAO x 3, NAD   RRR S1S2  CTA b/l   soft NTNDBS+  no c/c/e    LABS:                        10.8   12.9  )-----------( 273      ( 11 Jan 2018 07:09 )             32.0     01-11    127<L>  |  88<L>  |  11  ----------------------------<  110<H>  4.4   |  28  |  0.61    Ca    8.8      11 Jan 2018 07:09  Phos  4.1     01-10  Mg     1.8     01-10

## 2018-01-11 NOTE — DISCHARGE NOTE ADULT - CARE PLAN
Principal Discharge DX:	Chemotherapy adverse reaction  Goal:	Follow up with Dr. Hernandez after discharge  Instructions for follow-up, activity and diet:	You were admitted to the hospital after you had a reaction to your Chemotherapy regimen. Your oncology team followed during the admission and determined that you may need to be started on the next available medication for your cancer. Please ensure that going forward, for any doctor you see, that you have a reaction to this medications and should not take it.  Secondary Diagnosis:	Hyponatremia  Goal:	Follow up with PCP, and Dr. Hernandez after discharge.  Instructions for follow-up, activity and diet:	You were found to have low sodium in your blood that is well known to the oncology doctors. This is most likely secondary to your Cancer and is not something to worry about as it has remained stable. For now, you will continue with salt tablets to increase your sodium intake.  Secondary Diagnosis:	Cancer associated pain  Goal:	Follow up with PCP, palliative care (pain medicine) after discharge.  Instructions for follow-up, activity and diet:	Your medications were delayed because your medications for pain were not accurately initially assessed. This has been fixed within our system to reflect what you are taking at home. If your current regimen is giving you side effects or not adequately controlling your pain you should follow with your PCP or pain doctor to better assess your pain needs.  Secondary Diagnosis:	Non-small cell lung cancer (NSCLC)  Goal:	Follow up with Dr. Hernandez.

## 2018-01-11 NOTE — PROGRESS NOTE ADULT - PROBLEM SELECTOR PLAN 3
Repeat MRI brain with improved appearance of brain mets.   Seizure thought to be 2/2 Taxotere. I do not feel strongly that the pt needs Keppra long term.

## 2018-01-12 VITALS
SYSTOLIC BLOOD PRESSURE: 136 MMHG | TEMPERATURE: 98 F | OXYGEN SATURATION: 95 % | DIASTOLIC BLOOD PRESSURE: 84 MMHG | RESPIRATION RATE: 18 BRPM | HEART RATE: 110 BPM

## 2018-01-12 PROCEDURE — 95819 EEG AWAKE AND ASLEEP: CPT

## 2018-01-12 PROCEDURE — 80053 COMPREHEN METABOLIC PANEL: CPT

## 2018-01-12 PROCEDURE — 87086 URINE CULTURE/COLONY COUNT: CPT

## 2018-01-12 PROCEDURE — 70553 MRI BRAIN STEM W/O & W/DYE: CPT

## 2018-01-12 PROCEDURE — 80048 BASIC METABOLIC PNL TOTAL CA: CPT

## 2018-01-12 PROCEDURE — 82436 ASSAY OF URINE CHLORIDE: CPT

## 2018-01-12 PROCEDURE — 93005 ELECTROCARDIOGRAM TRACING: CPT

## 2018-01-12 PROCEDURE — 81001 URINALYSIS AUTO W/SCOPE: CPT

## 2018-01-12 PROCEDURE — 84133 ASSAY OF URINE POTASSIUM: CPT

## 2018-01-12 PROCEDURE — 83735 ASSAY OF MAGNESIUM: CPT

## 2018-01-12 PROCEDURE — 99239 HOSP IP/OBS DSCHRG MGMT >30: CPT

## 2018-01-12 PROCEDURE — 70450 CT HEAD/BRAIN W/O DYE: CPT

## 2018-01-12 PROCEDURE — 84300 ASSAY OF URINE SODIUM: CPT

## 2018-01-12 PROCEDURE — 71045 X-RAY EXAM CHEST 1 VIEW: CPT

## 2018-01-12 PROCEDURE — 96374 THER/PROPH/DIAG INJ IV PUSH: CPT

## 2018-01-12 PROCEDURE — 85027 COMPLETE CBC AUTOMATED: CPT

## 2018-01-12 PROCEDURE — 82570 ASSAY OF URINE CREATININE: CPT

## 2018-01-12 PROCEDURE — 87040 BLOOD CULTURE FOR BACTERIA: CPT

## 2018-01-12 PROCEDURE — 99285 EMERGENCY DEPT VISIT HI MDM: CPT | Mod: 25

## 2018-01-12 PROCEDURE — 83930 ASSAY OF BLOOD OSMOLALITY: CPT

## 2018-01-12 PROCEDURE — 84100 ASSAY OF PHOSPHORUS: CPT

## 2018-01-12 RX ORDER — SODIUM CHLORIDE 9 MG/ML
1 INJECTION INTRAMUSCULAR; INTRAVENOUS; SUBCUTANEOUS
Qty: 60 | Refills: 0 | OUTPATIENT
Start: 2018-01-12 | End: 2018-02-10

## 2018-01-12 RX ADMIN — Medication 100 MILLIGRAM(S): at 05:17

## 2018-01-12 RX ADMIN — ONDANSETRON 4 MILLIGRAM(S): 8 TABLET, FILM COATED ORAL at 05:37

## 2018-01-12 RX ADMIN — OXYCODONE HYDROCHLORIDE 10 MILLIGRAM(S): 5 TABLET ORAL at 12:15

## 2018-01-12 RX ADMIN — OXYCODONE HYDROCHLORIDE 15 MILLIGRAM(S): 5 TABLET ORAL at 01:35

## 2018-01-12 RX ADMIN — OXYCODONE HYDROCHLORIDE 10 MILLIGRAM(S): 5 TABLET ORAL at 05:49

## 2018-01-12 RX ADMIN — OXYCODONE HYDROCHLORIDE 10 MILLIGRAM(S): 5 TABLET ORAL at 11:37

## 2018-01-12 RX ADMIN — OXYCODONE HYDROCHLORIDE 10 MILLIGRAM(S): 5 TABLET ORAL at 05:17

## 2018-01-12 RX ADMIN — ENOXAPARIN SODIUM 40 MILLIGRAM(S): 100 INJECTION SUBCUTANEOUS at 11:32

## 2018-01-12 RX ADMIN — SODIUM CHLORIDE 1 GRAM(S): 9 INJECTION INTRAMUSCULAR; INTRAVENOUS; SUBCUTANEOUS at 05:17

## 2018-01-12 RX ADMIN — OXYCODONE HYDROCHLORIDE 15 MILLIGRAM(S): 5 TABLET ORAL at 01:05

## 2018-01-12 RX ADMIN — GABAPENTIN 300 MILLIGRAM(S): 400 CAPSULE ORAL at 08:17

## 2018-01-12 RX ADMIN — METHADONE HYDROCHLORIDE 10 MILLIGRAM(S): 40 TABLET ORAL at 08:16

## 2018-01-12 NOTE — PROGRESS NOTE ADULT - ASSESSMENT
47M with h/o  NSCLC s/p VATS, with brain metastasis ( recently started on chemotherapy - Taxotere)  who presents s/p seizure-like activity at chemotherapy session on 1/9. Physical exam is notable for cachexia. Labs are notable for marked leukocytosis and bandemia. Pt also has some hyponatremia. Cross sectional imaging brain with stable appearing mets and no acute changes.

## 2018-01-12 NOTE — PROGRESS NOTE ADULT - SUBJECTIVE AND OBJECTIVE BOX
Patient is a 47y old  Male who presents with a chief complaint of seizure (11 Jan 2018 15:20)      SUBJECTIVE / OVERNIGHT EVENTS:    MEDICATIONS  (STANDING):  benzonatate 100 milliGRAM(s) Oral three times a day  docusate sodium 100 milliGRAM(s) Oral three times a day  enoxaparin Injectable 40 milliGRAM(s) SubCutaneous daily  gabapentin 300 milliGRAM(s) Oral three times a day  HYDROcodone/homatropine Syrup 5 milliLiter(s) Oral every 6 hours  methadone    Tablet 10 milliGRAM(s) Oral three times a day  senna 2 Tablet(s) Oral at bedtime  sodium chloride 1 Gram(s) Oral two times a day    MEDICATIONS  (PRN):  acetaminophen   Tablet 650 milliGRAM(s) Oral every 6 hours PRN Mild Pain (1 - 3)  ondansetron    Tablet 4 milliGRAM(s) Oral every 8 hours PRN for nausea  oxyCODONE    IR 10 milliGRAM(s) Oral every 4 hours PRN Moderate Pain (4 - 6)  oxyCODONE    IR 15 milliGRAM(s) Oral every 4 hours PRN Severe Pain (7 - 10)      CAPILLARY BLOOD GLUCOSE        I&O's Summary    11 Jan 2018 07:01  -  12 Jan 2018 07:00  --------------------------------------------------------  IN: 600 mL / OUT: 0 mL / NET: 600 mL        T(C): 36.7 (01-12-18 @ 05:15), Max: 37.3 (01-11-18 @ 20:40)  HR: 96 (01-12-18 @ 05:15) (96 - 105)  BP: 126/81 (01-12-18 @ 05:15) (120/75 - 128/86)  RR: 18 (01-12-18 @ 05:15) (16 - 20)  SpO2: 95% (01-12-18 @ 05:15) (95% - 96%)    PHYSICAL EXAM:  GENERAL: NAD, well-developed  HEAD:  Atraumatic, Normocephalic  EYES: EOMI, PERRLA, conjunctiva and sclera clear  NECK: Supple, No JVD  CHEST/LUNG: Clear to auscultation bilaterally; No wheeze  HEART: Regular rate and rhythm; No murmurs, rubs, or gallops  ABDOMEN: Soft, Nontender, Nondistended; Bowel sounds present  EXTREMITIES:  2+ Peripheral Pulses, No clubbing, cyanosis, or edema  PSYCH: AAOx3  NEUROLOGY: non-focal  SKIN: No rashes or lesions    LABS:                        10.8   12.9  )-----------( 273      ( 11 Jan 2018 07:09 )             32.0     WBC Trend: 12.9<--, 12.6<--, 17.5<--  01-11    127<L>  |  88<L>  |  11  ----------------------------<  110<H>  4.4   |  28  |  0.61    Ca    8.8      11 Jan 2018 07:09      Creatinine Trend: 0.61<--, 0.74<--, 0.66<--, 0.66<--, 0.66<--, 0.65<--              RADIOLOGY & ADDITIONAL TESTS:    Imaging Personally Reviewed:    Consultant(s) Notes Reviewed:      Care Discussed with Consultants/Other Providers: Patient is a 47y old  Male who presents with a chief complaint of seizure (11 Jan 2018 15:20)      SUBJECTIVE / OVERNIGHT EVENTS: Pain is well controlled    MEDICATIONS  (STANDING):  benzonatate 100 milliGRAM(s) Oral three times a day  docusate sodium 100 milliGRAM(s) Oral three times a day  enoxaparin Injectable 40 milliGRAM(s) SubCutaneous daily  gabapentin 300 milliGRAM(s) Oral three times a day  HYDROcodone/homatropine Syrup 5 milliLiter(s) Oral every 6 hours  methadone    Tablet 10 milliGRAM(s) Oral three times a day  senna 2 Tablet(s) Oral at bedtime  sodium chloride 1 Gram(s) Oral two times a day    MEDICATIONS  (PRN):  acetaminophen   Tablet 650 milliGRAM(s) Oral every 6 hours PRN Mild Pain (1 - 3)  ondansetron    Tablet 4 milliGRAM(s) Oral every 8 hours PRN for nausea  oxyCODONE    IR 10 milliGRAM(s) Oral every 4 hours PRN Moderate Pain (4 - 6)  oxyCODONE    IR 15 milliGRAM(s) Oral every 4 hours PRN Severe Pain (7 - 10)      CAPILLARY BLOOD GLUCOSE        I&O's Summary    11 Jan 2018 07:01  -  12 Jan 2018 07:00  --------------------------------------------------------  IN: 600 mL / OUT: 0 mL / NET: 600 mL        T(C): 36.7 (01-12-18 @ 05:15), Max: 37.3 (01-11-18 @ 20:40)  HR: 96 (01-12-18 @ 05:15) (96 - 105)  BP: 126/81 (01-12-18 @ 05:15) (120/75 - 128/86)  RR: 18 (01-12-18 @ 05:15) (16 - 20)  SpO2: 95% (01-12-18 @ 05:15) (95% - 96%)    PHYSICAL EXAM:  GENERAL: NAD, thin  HEAD:  Atraumatic, Normocephalic  EYES: EOMI, PERRLA, conjunctiva and sclera clear  NECK: Supple, No JVD  CHEST/LUNG: Clear to auscultation bilaterally; No wheeze  HEART: Regular rate and rhythm; No murmurs, rubs, or gallops  ABDOMEN: Soft, Nontender, Nondistended; Bowel sounds present  EXTREMITIES:  2+ Peripheral Pulses, No clubbing, cyanosis, or edema  PSYCH: AAOx3  NEUROLOGY: non-focal  SKIN: No rashes or lesions    LABS:                        10.8   12.9  )-----------( 273      ( 11 Jan 2018 07:09 )             32.0     WBC Trend: 12.9<--, 12.6<--, 17.5<--  01-11    127<L>  |  88<L>  |  11  ----------------------------<  110<H>  4.4   |  28  |  0.61    Ca    8.8      11 Jan 2018 07:09      Creatinine Trend: 0.61<--, 0.74<--, 0.66<--, 0.66<--, 0.66<--, 0.65<--              RADIOLOGY & ADDITIONAL TESTS:    Imaging Personally Reviewed:    Consultant(s) Notes Reviewed:      Care Discussed with Consultants/Other Providers:

## 2018-01-12 NOTE — PROGRESS NOTE ADULT - PROBLEM SELECTOR PLAN 3
- Likely due to SIADH   - Oncology would prefer to hold off on fluid restriction, add salt tabs. No indication for additional pharm intervention in light of chronic and stable hyponatremia.  - ctm lytes.

## 2018-01-12 NOTE — PROGRESS NOTE ADULT - ATTENDING COMMENTS
[phone]
Agree with above note by R1 Dr. Ackerman - edited where appropriate  D/c to home  Onc f/u   35 minutes spent orchestrating discharge  Attending Physician Dr. Etienne Manrique [phone]
Above written by me - agree with additions made by R1 Dr. Ackerman as he completes his documentation for the day  Dispo planning pending pain control, MRI results
The above recommendations and possible side effects of medications were discussed with the patient. The patient had all questions answered and expressed understanding of the plan.  Physician: [place] Ash [phone]
Agree with above note by R1 Dr. Ackerman - edited where appropriate  Pt tolerating on home pain regimen  He wishes to go home tomorrow as his house is under construction.   Na stable on present mgmt.   MRI no acute change requiring intervention - stable or improved metastatic disease  35 minutes spent orchestrating discharge  Attending Physician Dr. Etienne Manrique [phone]

## 2018-01-12 NOTE — PROGRESS NOTE ADULT - PROBLEM SELECTOR PLAN 5
Lovenox SC for DVT ppx    Jean-Pierre Ackerman MD  [facility] Care Model [facility]  Pager # [phone]  Please page #[phone] if after 7:00 PM on weekdays. Please page covering pager after 12 PM on weekends.

## 2018-01-12 NOTE — PROGRESS NOTE ADULT - PROBLEM SELECTOR PLAN 1
-Adverse reaction to Taxotere infusion, No seizure activity noted on EEG.   - Neurology recs appreciated - d/c Keppra  - EEG unremarkable  - Fall precautions  - f/u MRI brain  - No more Taxotere to be given. Plan to follow with OP oncologist after discharge and change to 2nd line regimen.

## 2018-01-12 NOTE — PROGRESS NOTE ADULT - PROBLEM SELECTOR PLAN 4
-on chemotherapy  -continue f/up with Oncology  -Oxycontin / Oxycodone PRN / methadone for pain control  - ctm pain overnight.

## 2018-01-13 LAB
CULTURE RESULTS: SIGNIFICANT CHANGE UP
CULTURE RESULTS: SIGNIFICANT CHANGE UP
SPECIMEN SOURCE: SIGNIFICANT CHANGE UP
SPECIMEN SOURCE: SIGNIFICANT CHANGE UP

## 2018-01-17 ENCOUNTER — CHART COPY (OUTPATIENT)
Age: 48
End: 2018-01-17

## 2018-01-17 RX ORDER — HYDROCODONE BITARTRATE AND HOMATROPINE METHYLBROMIDE 5; 1.5 MG/5ML; MG/5ML
5-1.5 SYRUP ORAL
Qty: 400 | Refills: 0 | Status: ACTIVE | COMMUNITY
Start: 2017-11-22 | End: 1900-01-01

## 2018-01-17 RX ORDER — BENZONATATE 100 MG/1
100 CAPSULE ORAL 3 TIMES DAILY
Qty: 90 | Refills: 3 | Status: ACTIVE | COMMUNITY
Start: 2017-10-19 | End: 1900-01-01

## 2018-01-18 ENCOUNTER — APPOINTMENT (OUTPATIENT)
Dept: INFUSION THERAPY | Facility: HOSPITAL | Age: 48
End: 2018-01-18

## 2018-01-18 ENCOUNTER — RESULT REVIEW (OUTPATIENT)
Age: 48
End: 2018-01-18

## 2018-01-18 ENCOUNTER — APPOINTMENT (OUTPATIENT)
Dept: HEMATOLOGY ONCOLOGY | Facility: CLINIC | Age: 48
End: 2018-01-18

## 2018-01-18 VITALS
OXYGEN SATURATION: 93 % | SYSTOLIC BLOOD PRESSURE: 137 MMHG | DIASTOLIC BLOOD PRESSURE: 84 MMHG | RESPIRATION RATE: 17 BRPM | WEIGHT: 116.84 LBS | TEMPERATURE: 98.2 F | BODY MASS INDEX: 22.82 KG/M2 | HEART RATE: 119 BPM

## 2018-01-18 LAB
ALBUMIN SERPL ELPH-MCNC: 3 G/DL — LOW (ref 3.3–5)
ALP SERPL-CCNC: 125 U/L — HIGH (ref 40–120)
ALT FLD-CCNC: 14 U/L — SIGNIFICANT CHANGE UP (ref 10–45)
ANION GAP SERPL CALC-SCNC: 17 MMOL/L — SIGNIFICANT CHANGE UP (ref 5–17)
AST SERPL-CCNC: 21 U/L — SIGNIFICANT CHANGE UP (ref 10–40)
BASOPHILS # BLD AUTO: 0.1 K/UL — SIGNIFICANT CHANGE UP (ref 0–0.2)
BASOPHILS NFR BLD AUTO: 0.5 % — SIGNIFICANT CHANGE UP (ref 0–2)
BILIRUB SERPL-MCNC: 0.2 MG/DL — SIGNIFICANT CHANGE UP (ref 0.2–1.2)
BUN SERPL-MCNC: 12 MG/DL — SIGNIFICANT CHANGE UP (ref 7–23)
CALCIUM SERPL-MCNC: 8.4 MG/DL — SIGNIFICANT CHANGE UP (ref 8.4–10.5)
CHLORIDE SERPL-SCNC: 82 MMOL/L — LOW (ref 96–108)
CO2 SERPL-SCNC: 25 MMOL/L — SIGNIFICANT CHANGE UP (ref 22–31)
CREAT SERPL-MCNC: 0.54 MG/DL — SIGNIFICANT CHANGE UP (ref 0.5–1.3)
EOSINOPHIL # BLD AUTO: 1.4 K/UL — HIGH (ref 0–0.5)
EOSINOPHIL NFR BLD AUTO: 10.8 % — HIGH (ref 0–6)
GLUCOSE SERPL-MCNC: 85 MG/DL — SIGNIFICANT CHANGE UP (ref 70–99)
HCT VFR BLD CALC: 32.2 % — LOW (ref 39–50)
HGB BLD-MCNC: 11 G/DL — LOW (ref 13–17)
LYMPHOCYTES # BLD AUTO: 0.6 K/UL — LOW (ref 1–3.3)
LYMPHOCYTES # BLD AUTO: 4.8 % — LOW (ref 13–44)
MCHC RBC-ENTMCNC: 30.8 PG — SIGNIFICANT CHANGE UP (ref 27–34)
MCHC RBC-ENTMCNC: 34.3 G/DL — SIGNIFICANT CHANGE UP (ref 32–36)
MCV RBC AUTO: 89.8 FL — SIGNIFICANT CHANGE UP (ref 80–100)
MONOCYTES # BLD AUTO: 1.2 K/UL — HIGH (ref 0–0.9)
MONOCYTES NFR BLD AUTO: 9.2 % — SIGNIFICANT CHANGE UP (ref 2–14)
NEUTROPHILS # BLD AUTO: 9.8 K/UL — HIGH (ref 1.8–7.4)
NEUTROPHILS NFR BLD AUTO: 74.7 % — SIGNIFICANT CHANGE UP (ref 43–77)
PLATELET # BLD AUTO: 310 K/UL — SIGNIFICANT CHANGE UP (ref 150–400)
POTASSIUM SERPL-MCNC: 4.3 MMOL/L — SIGNIFICANT CHANGE UP (ref 3.5–5.3)
POTASSIUM SERPL-SCNC: 4.3 MMOL/L — SIGNIFICANT CHANGE UP (ref 3.5–5.3)
PROT SERPL-MCNC: 6.5 G/DL — SIGNIFICANT CHANGE UP (ref 6–8.3)
RBC # BLD: 3.58 M/UL — LOW (ref 4.2–5.8)
RBC # FLD: 15.6 % — HIGH (ref 10.3–14.5)
SODIUM SERPL-SCNC: 124 MMOL/L — LOW (ref 135–145)
WBC # BLD: 13.1 K/UL — HIGH (ref 3.8–10.5)
WBC # FLD AUTO: 13.1 K/UL — HIGH (ref 3.8–10.5)

## 2018-01-31 RX ORDER — OXYCODONE 10 MG/1
10 TABLET ORAL
Qty: 180 | Refills: 0 | Status: ACTIVE | COMMUNITY
Start: 2017-11-22 | End: 1900-01-01

## 2018-01-31 RX ORDER — METHADONE HYDROCHLORIDE 10 MG/1
10 TABLET ORAL
Qty: 90 | Refills: 0 | Status: ACTIVE | COMMUNITY
Start: 2017-12-18 | End: 1900-01-01

## 2018-02-02 ENCOUNTER — OUTPATIENT (OUTPATIENT)
Dept: OUTPATIENT SERVICES | Facility: HOSPITAL | Age: 48
LOS: 1 days | Discharge: HOME | End: 2018-02-02

## 2018-02-02 DIAGNOSIS — J90 PLEURAL EFFUSION, NOT ELSEWHERE CLASSIFIED: Chronic | ICD-10-CM

## 2018-02-02 DIAGNOSIS — C34.90 MALIGNANT NEOPLASM OF UNSPECIFIED PART OF UNSPECIFIED BRONCHUS OR LUNG: ICD-10-CM

## 2018-02-08 ENCOUNTER — RESULT REVIEW (OUTPATIENT)
Age: 48
End: 2018-02-08

## 2018-02-08 ENCOUNTER — APPOINTMENT (OUTPATIENT)
Dept: INFUSION THERAPY | Facility: HOSPITAL | Age: 48
End: 2018-02-08

## 2018-02-08 ENCOUNTER — LABORATORY RESULT (OUTPATIENT)
Age: 48
End: 2018-02-08

## 2018-02-08 LAB
BASOPHILS # BLD AUTO: 0.1 K/UL — SIGNIFICANT CHANGE UP (ref 0–0.2)
BASOPHILS NFR BLD AUTO: 1 % — SIGNIFICANT CHANGE UP (ref 0–2)
EOSINOPHIL # BLD AUTO: 0 K/UL — SIGNIFICANT CHANGE UP (ref 0–0.5)
EOSINOPHIL NFR BLD AUTO: 4 % — SIGNIFICANT CHANGE UP (ref 0–6)
HCT VFR BLD CALC: 34.9 % — LOW (ref 39–50)
HGB BLD-MCNC: 11.8 G/DL — LOW (ref 13–17)
LYMPHOCYTES # BLD AUTO: 1.2 K/UL — SIGNIFICANT CHANGE UP (ref 1–3.3)
LYMPHOCYTES # BLD AUTO: 11 % — LOW (ref 13–44)
MCHC RBC-ENTMCNC: 29.5 PG — SIGNIFICANT CHANGE UP (ref 27–34)
MCHC RBC-ENTMCNC: 33.9 G/DL — SIGNIFICANT CHANGE UP (ref 32–36)
MCV RBC AUTO: 87 FL — SIGNIFICANT CHANGE UP (ref 80–100)
MONOCYTES # BLD AUTO: 2.1 K/UL — HIGH (ref 0–0.9)
MONOCYTES NFR BLD AUTO: 6 % — SIGNIFICANT CHANGE UP (ref 2–14)
MYELOCYTES NFR BLD: 2 % — HIGH (ref 0–0)
NEUTROPHILS # BLD AUTO: 12.9 K/UL — HIGH (ref 1.8–7.4)
NEUTROPHILS NFR BLD AUTO: 76 % — SIGNIFICANT CHANGE UP (ref 43–77)
PLAT MORPH BLD: NORMAL — SIGNIFICANT CHANGE UP
PLATELET # BLD AUTO: 327 K/UL — SIGNIFICANT CHANGE UP (ref 150–400)
RBC # BLD: 4.02 M/UL — LOW (ref 4.2–5.8)
RBC # FLD: 16.3 % — HIGH (ref 10.3–14.5)
RBC BLD AUTO: SIGNIFICANT CHANGE UP
WBC # BLD: 16.3 K/UL — HIGH (ref 3.8–10.5)
WBC # FLD AUTO: 16.3 K/UL — HIGH (ref 3.8–10.5)

## 2018-02-09 DIAGNOSIS — R11.2 NAUSEA WITH VOMITING, UNSPECIFIED: ICD-10-CM

## 2018-02-09 DIAGNOSIS — Z51.11 ENCOUNTER FOR ANTINEOPLASTIC CHEMOTHERAPY: ICD-10-CM

## 2018-02-22 ENCOUNTER — APPOINTMENT (OUTPATIENT)
Dept: HEMATOLOGY ONCOLOGY | Facility: CLINIC | Age: 48
End: 2018-02-22

## 2018-02-22 ENCOUNTER — RESULT REVIEW (OUTPATIENT)
Age: 48
End: 2018-02-22

## 2018-02-22 VITALS
DIASTOLIC BLOOD PRESSURE: 77 MMHG | OXYGEN SATURATION: 91 % | BODY MASS INDEX: 21.66 KG/M2 | WEIGHT: 110.89 LBS | SYSTOLIC BLOOD PRESSURE: 113 MMHG | TEMPERATURE: 99 F | HEART RATE: 123 BPM | RESPIRATION RATE: 16 BRPM

## 2018-02-22 DIAGNOSIS — C79.31 SECONDARY MALIGNANT NEOPLASM OF BRAIN: ICD-10-CM

## 2018-02-22 DIAGNOSIS — J91.0 MALIGNANT PLEURAL EFFUSION: ICD-10-CM

## 2018-02-22 DIAGNOSIS — M25.519 PAIN IN UNSPECIFIED SHOULDER: ICD-10-CM

## 2018-02-22 DIAGNOSIS — E63.9 NUTRITIONAL DEFICIENCY, UNSPECIFIED: ICD-10-CM

## 2018-02-22 LAB
ALBUMIN SERPL ELPH-MCNC: 2.9 G/DL — LOW (ref 3.3–5)
ALP SERPL-CCNC: 157 U/L — HIGH (ref 40–120)
ALT FLD-CCNC: 16 U/L — SIGNIFICANT CHANGE UP (ref 10–45)
ANION GAP SERPL CALC-SCNC: 14 MMOL/L — SIGNIFICANT CHANGE UP (ref 5–17)
AST SERPL-CCNC: 19 U/L — SIGNIFICANT CHANGE UP (ref 10–40)
BILIRUB SERPL-MCNC: 0.3 MG/DL — SIGNIFICANT CHANGE UP (ref 0.2–1.2)
BUN SERPL-MCNC: 13 MG/DL — SIGNIFICANT CHANGE UP (ref 7–23)
CALCIUM SERPL-MCNC: 9.2 MG/DL — SIGNIFICANT CHANGE UP (ref 8.4–10.5)
CEA SERPL-MCNC: 359.2 NG/ML — HIGH (ref 0–3.8)
CHLORIDE SERPL-SCNC: 88 MMOL/L — LOW (ref 96–108)
CO2 SERPL-SCNC: 27 MMOL/L — SIGNIFICANT CHANGE UP (ref 22–31)
CREAT SERPL-MCNC: 0.6 MG/DL — SIGNIFICANT CHANGE UP (ref 0.5–1.3)
GLUCOSE SERPL-MCNC: 102 MG/DL — HIGH (ref 70–99)
HCT VFR BLD CALC: 32.2 % — LOW (ref 39–50)
HGB BLD-MCNC: 10.7 G/DL — LOW (ref 13–17)
MCHC RBC-ENTMCNC: 28.3 PG — SIGNIFICANT CHANGE UP (ref 27–34)
MCHC RBC-ENTMCNC: 33.1 G/DL — SIGNIFICANT CHANGE UP (ref 32–36)
MCV RBC AUTO: 85.4 FL — SIGNIFICANT CHANGE UP (ref 80–100)
PLATELET # BLD AUTO: 356 K/UL — SIGNIFICANT CHANGE UP (ref 150–400)
POTASSIUM SERPL-MCNC: 4.8 MMOL/L — SIGNIFICANT CHANGE UP (ref 3.5–5.3)
POTASSIUM SERPL-SCNC: 4.8 MMOL/L — SIGNIFICANT CHANGE UP (ref 3.5–5.3)
PROT SERPL-MCNC: 6.6 G/DL — SIGNIFICANT CHANGE UP (ref 6–8.3)
RBC # BLD: 3.77 M/UL — LOW (ref 4.2–5.8)
RBC # FLD: 16.7 % — HIGH (ref 10.3–14.5)
SODIUM SERPL-SCNC: 129 MMOL/L — LOW (ref 135–145)
WBC # BLD: 10.1 K/UL — SIGNIFICANT CHANGE UP (ref 3.8–10.5)
WBC # FLD AUTO: 10.1 K/UL — SIGNIFICANT CHANGE UP (ref 3.8–10.5)

## 2018-02-22 RX ORDER — MIRTAZAPINE 15 MG/1
15 TABLET, FILM COATED ORAL
Qty: 30 | Refills: 2 | Status: ACTIVE | COMMUNITY
Start: 2018-02-22 | End: 1900-01-01

## 2018-02-22 RX ORDER — FEXOFENADINE HYDROCHLORIDE 180 MG/1
180 TABLET ORAL DAILY
Qty: 14 | Refills: 1 | Status: DISCONTINUED | COMMUNITY
Start: 2017-12-20 | End: 2018-02-22

## 2018-02-22 RX ORDER — GABAPENTIN 300 MG/1
300 CAPSULE ORAL
Qty: 90 | Refills: 0 | Status: ACTIVE | COMMUNITY
Start: 2017-12-04 | End: 1900-01-01

## 2018-02-26 ENCOUNTER — APPOINTMENT (OUTPATIENT)
Dept: GERIATRICS | Facility: CLINIC | Age: 48
End: 2018-02-26
Payer: MEDICAID

## 2018-02-26 ENCOUNTER — APPOINTMENT (OUTPATIENT)
Dept: RADIOLOGY | Facility: IMAGING CENTER | Age: 48
End: 2018-02-26
Payer: MEDICAID

## 2018-02-26 ENCOUNTER — OUTPATIENT (OUTPATIENT)
Dept: OUTPATIENT SERVICES | Facility: HOSPITAL | Age: 48
LOS: 1 days | End: 2018-02-26
Payer: MEDICAID

## 2018-02-26 VITALS
BODY MASS INDEX: 21.48 KG/M2 | TEMPERATURE: 98.7 F | WEIGHT: 109.99 LBS | RESPIRATION RATE: 18 BRPM | HEART RATE: 135 BPM | SYSTOLIC BLOOD PRESSURE: 115 MMHG | DIASTOLIC BLOOD PRESSURE: 77 MMHG | OXYGEN SATURATION: 81 %

## 2018-02-26 VITALS — HEART RATE: 132 BPM | OXYGEN SATURATION: 87 %

## 2018-02-26 DIAGNOSIS — G89.3 NEOPLASM RELATED PAIN (ACUTE) (CHRONIC): ICD-10-CM

## 2018-02-26 DIAGNOSIS — C78.00 SECONDARY MALIGNANT NEOPLASM OF UNSPECIFIED LUNG: ICD-10-CM

## 2018-02-26 DIAGNOSIS — R09.02 HYPOXEMIA: ICD-10-CM

## 2018-02-26 DIAGNOSIS — M25.512 PAIN IN LEFT SHOULDER: ICD-10-CM

## 2018-02-26 DIAGNOSIS — Z51.5 ENCOUNTER FOR PALLIATIVE CARE: ICD-10-CM

## 2018-02-26 DIAGNOSIS — J90 PLEURAL EFFUSION, NOT ELSEWHERE CLASSIFIED: Chronic | ICD-10-CM

## 2018-02-26 DIAGNOSIS — R05 COUGH: ICD-10-CM

## 2018-02-26 PROCEDURE — 99215 OFFICE O/P EST HI 40 MIN: CPT

## 2018-02-26 PROCEDURE — 73030 X-RAY EXAM OF SHOULDER: CPT | Mod: 26,LT

## 2018-02-26 PROCEDURE — 73030 X-RAY EXAM OF SHOULDER: CPT

## 2018-02-26 RX ORDER — MULTIVITAMIN/IRON/FOLIC ACID 18MG-0.4MG
TABLET ORAL DAILY
Qty: 1 | Refills: 0 | Status: ACTIVE | COMMUNITY
Start: 2018-02-26 | End: 1900-01-01

## 2018-02-28 ENCOUNTER — APPOINTMENT (OUTPATIENT)
Dept: RADIATION ONCOLOGY | Facility: CLINIC | Age: 48
End: 2018-02-28
Payer: MEDICAID

## 2018-02-28 PROCEDURE — 99213 OFFICE O/P EST LOW 20 MIN: CPT | Mod: GC

## 2018-02-28 RX ORDER — HYDROCODONE BITARTRATE AND HOMATROPINE METHYLBROMIDE 5; 1.5 MG/5ML; MG/5ML
5-1.5 SYRUP ORAL
Qty: 400 | Refills: 0 | Status: ACTIVE | COMMUNITY
Start: 2018-02-28 | End: 1900-01-01

## 2018-03-01 ENCOUNTER — APPOINTMENT (OUTPATIENT)
Dept: INFUSION THERAPY | Facility: HOSPITAL | Age: 48
End: 2018-03-01

## 2018-03-20 ENCOUNTER — OUTPATIENT (OUTPATIENT)
Dept: OUTPATIENT SERVICES | Facility: HOSPITAL | Age: 48
LOS: 1 days | Discharge: HOME | End: 2018-03-20

## 2018-03-20 DIAGNOSIS — C34.90 MALIGNANT NEOPLASM OF UNSPECIFIED PART OF UNSPECIFIED BRONCHUS OR LUNG: ICD-10-CM

## 2018-03-20 DIAGNOSIS — J90 PLEURAL EFFUSION, NOT ELSEWHERE CLASSIFIED: Chronic | ICD-10-CM

## 2018-03-22 ENCOUNTER — APPOINTMENT (OUTPATIENT)
Dept: INFUSION THERAPY | Facility: HOSPITAL | Age: 48
End: 2018-03-22

## 2018-03-22 ENCOUNTER — APPOINTMENT (OUTPATIENT)
Dept: HEMATOLOGY ONCOLOGY | Facility: CLINIC | Age: 48
End: 2018-03-22

## 2018-03-29 ENCOUNTER — APPOINTMENT (OUTPATIENT)
Dept: HEMATOLOGY ONCOLOGY | Facility: CLINIC | Age: 48
End: 2018-03-29

## 2018-05-03 ENCOUNTER — APPOINTMENT (OUTPATIENT)
Dept: RADIATION ONCOLOGY | Facility: CLINIC | Age: 48
End: 2018-05-03

## 2018-07-23 PROBLEM — H93.8X9: Status: ACTIVE | Noted: 2017-12-18

## 2018-07-23 PROBLEM — M79.2 NEUROPATHIC PAIN: Status: ACTIVE | Noted: 2017-12-04

## 2020-12-29 NOTE — H&P ADULT - GASTROINTESTINAL
Patient completed paperwork. Faxed to [facility] as requested.   details… detailed exam

## 2023-02-17 NOTE — PATIENT PROFILE ADULT. - HAS THE PATIENT HAD A SIGNIFICANT CHANGE IN FUNCTIONAL STATUS DUE TO CVA, HEAD TRAUMA, ORTHOPEDIC TRAUMA/SURGERY, OR FALL, WITH THE WEEK PRIOR TO ADMISSION
Will get an x-ray of your lumbar spine today. I will my chart you with the results  When you have recovered from your surgery you can send me a message via my chart that you are ready to start PT and I will place the order  Follow up with me when PT is completed if you are still having trouble with back pain and we can discuss options that can help.   Bella Melgar MD  [facility]     ----------------------------------------------------------------  Clinic Number:  [phone]   Call with any questions about your care and for scheduling assistance.   Calls are returned Monday through Friday between 8 AM and 4:30 PM. We usually get back to you within 2 business days depending on the issue/request.    If we are prescribing your medications:  Call your pharmacy directly to request a refill. Please allow 3-4 days to be processed.     We believe regular attendance is key to your success in our program!    Any time you are unable to keep your appointment we ask that you call us at least 24 hours in advance to cancel.This will allow us to offer the appointment time to another patient.   Multiple missed appointments may lead to dismissal from the clinic.    no

## 2023-07-11 NOTE — PRE-OP CHECKLIST - NS PREOP CHK CHLOROHEX WASH
Bill For Surgical Tray: no Expected Date Of Service: 04/06/2023 Billing Type: Third-Party Bill Clinical Notes (To The Lab): STANDING ORDER: 4/6/23 - 11/4/23 N/A
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
